# Patient Record
Sex: FEMALE | Race: WHITE | Employment: OTHER | URBAN - METROPOLITAN AREA
[De-identification: names, ages, dates, MRNs, and addresses within clinical notes are randomized per-mention and may not be internally consistent; named-entity substitution may affect disease eponyms.]

---

## 2017-03-23 PROBLEM — E78.5 DYSLIPIDEMIA: Status: ACTIVE | Noted: 2017-03-23

## 2018-04-14 ENCOUNTER — HOSPITAL ENCOUNTER (INPATIENT)
Age: 68
LOS: 4 days | Discharge: HOME HEALTH | DRG: 189 | End: 2018-04-19
Attending: EMERGENCY MEDICINE | Admitting: INTERNAL MEDICINE
Payer: MEDICARE

## 2018-04-14 ENCOUNTER — APPOINTMENT (OUTPATIENT)
Dept: GENERAL RADIOLOGY | Age: 68
DRG: 189 | End: 2018-04-14
Attending: EMERGENCY MEDICINE
Payer: MEDICARE

## 2018-04-14 DIAGNOSIS — G47.34 NOCTURNAL HYPOXEMIA: Chronic | ICD-10-CM

## 2018-04-14 DIAGNOSIS — J96.01 ACUTE RESPIRATORY FAILURE WITH HYPOXIA AND HYPERCAPNIA (HCC): ICD-10-CM
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
## 2018-04-14 DIAGNOSIS — I95.9 HYPOTENSION, UNSPECIFIED HYPOTENSION TYPE: ICD-10-CM

## 2018-04-14 DIAGNOSIS — Z72.0 TOBACCO ABUSE: Chronic | ICD-10-CM

## 2018-04-14 DIAGNOSIS — J96.22 ACUTE ON CHRONIC RESPIRATORY FAILURE WITH HYPOXIA AND HYPERCAPNIA (HCC): ICD-10-CM

## 2018-04-14 DIAGNOSIS — J96.21 ACUTE ON CHRONIC RESPIRATORY FAILURE WITH HYPOXIA AND HYPERCAPNIA (HCC): ICD-10-CM

## 2018-04-14 DIAGNOSIS — J44.1 CHRONIC OBSTRUCTIVE PULMONARY DISEASE WITH ACUTE EXACERBATION (HCC): ICD-10-CM

## 2018-04-14 DIAGNOSIS — J96.02 ACUTE RESPIRATORY FAILURE WITH HYPOXIA AND HYPERCAPNIA (HCC): ICD-10-CM
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
## 2018-04-14 DIAGNOSIS — R00.1 BRADYCARDIA: ICD-10-CM

## 2018-04-14 DIAGNOSIS — S20.212A CONTUSION OF RIB ON LEFT SIDE, INITIAL ENCOUNTER: ICD-10-CM

## 2018-04-14 DIAGNOSIS — B85.0 HEAD LICE INFESTATION: Chronic | ICD-10-CM

## 2018-04-14 DIAGNOSIS — J44.1 COPD EXACERBATION (HCC): ICD-10-CM

## 2018-04-14 DIAGNOSIS — J44.1 ACUTE EXACERBATION OF CHRONIC OBSTRUCTIVE PULMONARY DISEASE (COPD) (HCC): Primary | ICD-10-CM

## 2018-04-14 PROBLEM — J96.00 ACUTE RESPIRATORY FAILURE (HCC): Status: ACTIVE | Noted: 2018-04-14

## 2018-04-14 LAB
ALBUMIN SERPL-MCNC: 3.2 G/DL (ref 3.2–4.6)
ALBUMIN/GLOB SERPL: 0.7 {RATIO} (ref 1.2–3.5)
ALP SERPL-CCNC: 93 U/L (ref 50–136)
ALT SERPL-CCNC: 19 U/L (ref 12–65)
ANION GAP SERPL CALC-SCNC: 7 MMOL/L (ref 7–16)
AST SERPL-CCNC: 30 U/L (ref 15–37)
BASOPHILS # BLD: 0.1 K/UL (ref 0–0.2)
BASOPHILS NFR BLD: 1 % (ref 0–2)
BILIRUB SERPL-MCNC: 0.5 MG/DL (ref 0.2–1.1)
BUN SERPL-MCNC: 11 MG/DL (ref 8–23)
CALCIUM SERPL-MCNC: 9.2 MG/DL (ref 8.3–10.4)
CHLORIDE SERPL-SCNC: 101 MMOL/L (ref 98–107)
CO2 SERPL-SCNC: 32 MMOL/L (ref 21–32)
CREAT SERPL-MCNC: 0.79 MG/DL (ref 0.6–1)
DIFFERENTIAL METHOD BLD: NORMAL
EOSINOPHIL # BLD: 0.3 K/UL (ref 0–0.8)
EOSINOPHIL NFR BLD: 4 % (ref 0.5–7.8)
ERYTHROCYTE [DISTWIDTH] IN BLOOD BY AUTOMATED COUNT: 12.6 % (ref 11.9–14.6)
GLOBULIN SER CALC-MCNC: 4.5 G/DL (ref 2.3–3.5)
GLUCOSE SERPL-MCNC: 186 MG/DL (ref 65–100)
HCT VFR BLD AUTO: 43.8 % (ref 35.8–46.3)
HGB BLD-MCNC: 14.4 G/DL (ref 11.7–15.4)
IMM GRANULOCYTES # BLD: 0.1 K/UL (ref 0–0.5)
IMM GRANULOCYTES NFR BLD AUTO: 1 % (ref 0–5)
INR PPP: 1
LYMPHOCYTES # BLD: 2.4 K/UL (ref 0.5–4.6)
LYMPHOCYTES NFR BLD: 29 % (ref 13–44)
MCH RBC QN AUTO: 29.8 PG (ref 26.1–32.9)
MCHC RBC AUTO-ENTMCNC: 32.9 G/DL (ref 31.4–35)
MCV RBC AUTO: 90.7 FL (ref 79.6–97.8)
MONOCYTES # BLD: 0.6 K/UL (ref 0.1–1.3)
MONOCYTES NFR BLD: 7 % (ref 4–12)
NEUTS SEG # BLD: 4.8 K/UL (ref 1.7–8.2)
NEUTS SEG NFR BLD: 58 % (ref 43–78)
PLATELET # BLD AUTO: 205 K/UL (ref 150–450)
PMV BLD AUTO: 11.2 FL (ref 10.8–14.1)
POTASSIUM SERPL-SCNC: 4.4 MMOL/L (ref 3.5–5.1)
PROT SERPL-MCNC: 7.7 G/DL (ref 6.3–8.2)
PROTHROMBIN TIME: 13.1 SEC (ref 11.5–14.5)
RBC # BLD AUTO: 4.83 M/UL (ref 4.05–5.25)
SODIUM SERPL-SCNC: 140 MMOL/L (ref 136–145)
WBC # BLD AUTO: 8.2 K/UL (ref 4.3–11.1)

## 2018-04-14 PROCEDURE — 80053 COMPREHEN METABOLIC PANEL: CPT | Performed by: EMERGENCY MEDICINE

## 2018-04-14 PROCEDURE — 74011000250 HC RX REV CODE- 250: Performed by: EMERGENCY MEDICINE

## 2018-04-14 PROCEDURE — 74011250637 HC RX REV CODE- 250/637: Performed by: EMERGENCY MEDICINE

## 2018-04-14 PROCEDURE — 83880 ASSAY OF NATRIURETIC PEPTIDE: CPT | Performed by: EMERGENCY MEDICINE

## 2018-04-14 PROCEDURE — 96375 TX/PRO/DX INJ NEW DRUG ADDON: CPT | Performed by: EMERGENCY MEDICINE

## 2018-04-14 PROCEDURE — 96374 THER/PROPH/DIAG INJ IV PUSH: CPT | Performed by: EMERGENCY MEDICINE

## 2018-04-14 PROCEDURE — 85025 COMPLETE CBC W/AUTO DIFF WBC: CPT | Performed by: EMERGENCY MEDICINE

## 2018-04-14 PROCEDURE — 99285 EMERGENCY DEPT VISIT HI MDM: CPT | Performed by: EMERGENCY MEDICINE

## 2018-04-14 PROCEDURE — 74011250636 HC RX REV CODE- 250/636: Performed by: EMERGENCY MEDICINE

## 2018-04-14 PROCEDURE — 71101 X-RAY EXAM UNILAT RIBS/CHEST: CPT

## 2018-04-14 PROCEDURE — 94640 AIRWAY INHALATION TREATMENT: CPT

## 2018-04-14 PROCEDURE — 85610 PROTHROMBIN TIME: CPT | Performed by: EMERGENCY MEDICINE

## 2018-04-14 RX ORDER — IPRATROPIUM BROMIDE AND ALBUTEROL SULFATE 2.5; .5 MG/3ML; MG/3ML
3 SOLUTION RESPIRATORY (INHALATION)
Status: COMPLETED | OUTPATIENT
Start: 2018-04-14 | End: 2018-04-14

## 2018-04-14 RX ORDER — MORPHINE SULFATE 4 MG/ML
4 INJECTION, SOLUTION INTRAMUSCULAR; INTRAVENOUS
Status: COMPLETED | OUTPATIENT
Start: 2018-04-14 | End: 2018-04-14

## 2018-04-14 RX ORDER — MAGNESIUM SULFATE HEPTAHYDRATE 40 MG/ML
2 INJECTION, SOLUTION INTRAVENOUS
Status: COMPLETED | OUTPATIENT
Start: 2018-04-14 | End: 2018-04-14

## 2018-04-14 RX ORDER — HYDROCODONE BITARTRATE AND ACETAMINOPHEN 7.5; 325 MG/1; MG/1
1 TABLET ORAL
Status: COMPLETED | OUTPATIENT
Start: 2018-04-14 | End: 2018-04-14

## 2018-04-14 RX ORDER — ONDANSETRON 2 MG/ML
4 INJECTION INTRAMUSCULAR; INTRAVENOUS
Status: COMPLETED | OUTPATIENT
Start: 2018-04-14 | End: 2018-04-14

## 2018-04-14 RX ORDER — ALBUTEROL SULFATE 0.83 MG/ML
5 SOLUTION RESPIRATORY (INHALATION)
Status: COMPLETED | OUTPATIENT
Start: 2018-04-14 | End: 2018-04-14

## 2018-04-14 RX ADMIN — ALBUTEROL SULFATE 5 MG: 2.5 SOLUTION RESPIRATORY (INHALATION) at 22:35

## 2018-04-14 RX ADMIN — ONDANSETRON 4 MG: 2 INJECTION INTRAMUSCULAR; INTRAVENOUS at 21:16

## 2018-04-14 RX ADMIN — HYDROCODONE BITARTRATE AND ACETAMINOPHEN 1 TABLET: 7.5; 325 TABLET ORAL at 22:37

## 2018-04-14 RX ADMIN — MAGNESIUM SULFATE HEPTAHYDRATE 2 G: 40 INJECTION, SOLUTION INTRAVENOUS at 23:12

## 2018-04-14 RX ADMIN — MORPHINE SULFATE 4 MG: 4 INJECTION, SOLUTION INTRAMUSCULAR; INTRAVENOUS at 21:16

## 2018-04-14 RX ADMIN — METHYLPREDNISOLONE SODIUM SUCCINATE 125 MG: 125 INJECTION, POWDER, FOR SOLUTION INTRAMUSCULAR; INTRAVENOUS at 22:24

## 2018-04-14 RX ADMIN — IPRATROPIUM BROMIDE AND ALBUTEROL SULFATE 3 ML: .5; 3 SOLUTION RESPIRATORY (INHALATION) at 21:08

## 2018-04-14 NOTE — IP AVS SNAPSHOT
[facility] 
 
 
 [address] [place] [address] 322 [facility] 
[phone] Patient: Alize Ricci MRN: [id number] About your hospitalization You were admitted on:  April 15, 2018 You last received care in the:  [facility] You were discharged on:  April 19, 2018 Why you were hospitalized Your primary diagnosis was:  Acute On Chronic Respiratory Failure With Hypoxia And Hypercapnia (Hcc) Your diagnoses also included:  Dm (Diabetes Mellitus) (Hcc), Coronary Artery Disease Involving Native Coronary Artery With Unstable Angina Pectoris (Hcc), Hypothyroidism, Head Lice Infestation, Tobacco Abuse, Nocturnal Hypoxemia, Fall, Bradycardia, Copd Exacerbation (Hcc), Morbid Obesity (Hcc) Follow-up Information Follow up With Details Comments Contact Info Raheel Flowers MD On 4/25/2018 1:15pm [place] [address] [address] [address] [place] [address] 
[phone] BETO Ying On 5/15/2018 12:30  [address] [facility] [address] 
[address] [facility] [address] 
[phone] [address] [facility] [address] 
[phone] Your Scheduled Appointments Monday April 23, 2018 10:45 AM EDT Office Visit with Joseph Cid, 700 [facility] (800 [facility]) [address] 
[address] [facility]  
[phone] Tuesday May 15, 2018  1:00 PM EDT  
(Arrive by 12:30 PM) HOSPITAL with BETO Y[facility]) 75 [facility] [address]  
[phone] Discharge Orders None A check jihan indicates which time of day the medication should be taken. My Medications START taking these medications Instructions Each Dose to Equal  
 Morning Noon Evening Bedtime albuterol-ipratropium 2.5 mg-0.5 mg/3 ml Nebu Commonly known as:  Arlette Banerjee Your next dose is:  Per as needed schedule 3 mL by Nebulization route every six (6) hours as needed. 3 mL  
    
   
   
   
  
 azithromycin 250 mg tablet Commonly known as:  Aba Raman Your last dose was:  4/18/2018 9pm  
Your next dose is:  04/19/18 9pm  
   
 Take 1 Tab by mouth daily for 4 days. 250 mg  
    
   
   
   
  
  
 fluticasone-salmeterol 250-50 mcg/dose diskus inhaler Commonly known as:  ADVAIR Your next dose is:  Resume home schedule Take 1 Puff by inhalation two (2) times a day. 1 Puff  
    
  
   
   
  
   
  
 predniSONE 20 mg tablet Commonly known as:  Krishan Llanos Your last dose was:  04/19/18 am  
Your next dose is:  04/20/18 am  
   
 Take 2 tabs daily x 2 days, take 1.5 tabs daily x 2 days, take 1 tab daily x 2 days, take 1/2 tab daily x 2 days then stop. CHANGE how you take these medications Instructions Each Dose to Equal  
 Morning Noon Evening Bedtime ALPRAZolam 2 mg tablet Commonly known as:  Keily Sweeney What changed:   
- how much to take - when to take this 
- reasons to take this Your last dose was:  4/18/2018 9:40pm  
Your next dose is:  Per as needed schedule Take 0.5 Tabs by mouth three (3) times daily as needed for Anxiety. Max Daily Amount: 3 mg. Take / use AM day of surgery  per anesthesia protocols. Indications: ANXIETY WITH DEPRESSION  
 1 mg CONTINUE taking these medications Instructions Each Dose to Equal  
 Morning Noon Evening Bedtime ACTOS 30 mg tablet Generic drug:  pioglitazone Your next dose is:  Resume home schedule Take 30 mg by mouth daily. Indications: type 2 diabetes mellitus 30 mg ANTIVERT 25 mg tablet Generic drug:  meclizine Your next dose is:  Per as needed schedule Take 25 mg by mouth three (3) times daily as needed. Indications: VERTIGO 25 mg  
    
   
   
   
  
 aspirin 81 mg tablet Your last dose was:  04/19/18 am  
Your next dose is:  04/20/18 am  
   
 Take 81 mg by mouth daily. Take / use AM day of surgery  per anesthesia protocols. 81 mg  
    
  
   
   
   
  
 clopidogrel 75 mg Tab Commonly known as:  PLAVIX Your last dose was:  04/19/18 am  
Your next dose is:  04/20/18 am  
   
 Take 1 Tab by mouth daily. 75 mg  
    
  
   
   
   
  
 CRESTOR 10 mg tablet Generic drug:  rosuvastatin Your last dose was:  4/18/2018 bedtime Your next dose is:  04/19/18 bedtime Take 10 mg by mouth nightly. Indications: HYPERCHOLESTEROLEMIA 10 mg  
    
   
   
   
  
  
 fenofibrate micronized 200 mg capsule Commonly known as:  LOFIBRA Your next dose is:  04/19/18 bedtime TAKE ONE CAPSULE BY MOUTH EACH NIGHT AT BEDTIME FOR TRIGLYCERIDES AND CHOLESTEROL. LANTUS U-100 INSULIN 100 unit/mL injection Generic drug:  insulin glargine Your last dose was:  4/18/2018 9:30pm  
Your next dose is:  04/19/18 bedtime 80 Units by SubCUTAneous route nightly. Indications: TYPE 2 DIABETES MELLITUS  
 80 Units LEXAPRO 20 mg tablet Generic drug:  escitalopram oxalate Your next dose is:  Resume home schedule Take 20 mg by mouth daily. Take / use AM day of surgery  per anesthesia protocols. Indications: ANXIETY WITH DEPRESSION  
 20 mg  
    
  
   
   
   
  
 loperamide 2 mg tablet Commonly known as:  IMMODIUM Your next dose is:  Per as needed schedule Take 2 mg by mouth four (4) times daily as needed for Diarrhea.  
 2 mg  
    
   
   
   
  
 losartan 25 mg tablet Commonly known as:  COZAAR Your last dose was:  04/19/18 am  
Your next dose is:  04/20/18 am  
   
 TAKE ONE TABLET BY MOUTH EVERY DAY FOR BLOOD PRESSURE  
     
  
   
   
   
  
 MUCINEX 1,200 mg Ta12 ER tablet Generic drug:  guaiFENesin Your last dose was:  04/19/18 am  
Your next dose is:  04/19/18 9pm  
   
 Take 1,200 mg by mouth two (2) times a day. Indications: COLD SYMPTOMS, Cough 1200 mg  
    
  
   
   
   
  
  
 niacin  mg tablet Commonly known as:  Eva Foster Your last dose was:  4/18/2018 9:40pm  
Your next dose is:  04/19/18 pm  
   
 TAKE ONE TABLET BY MOUTH ONCE DAILY FOR TRIGLYCERIDES AND CHOLESTEROL. NITROSTAT 0.4 mg SL tablet Generic drug:  nitroglycerin Your next dose is:  Per as needed schedule  
   
 0.4 mg by SubLINGual route every five (5) minutes as needed. Take / use AM day of surgery  per anesthesia protocols if needed 0.4 mg  
    
   
   
   
  
 PROAIR HFA 90 mcg/actuation inhaler Generic drug:  albuterol Your next dose is:  Per as needed schedule Take 2 Puffs by inhalation every four (4) hours as needed. Take / use AM day of surgery  per anesthesia protocols if needed 2 Puff PROTONIX 40 mg tablet Generic drug:  pantoprazole Your last dose was:  04/19/18 am  
Your next dose is:  04/19/18 4pm  
   
 Take 40 mg by mouth two (2) times a day. Take / use AM day of surgery  per anesthesia protocols. Indications: GASTROESOPHAGEAL REFLUX  
 40 mg SYNTHROID 100 mcg tablet Generic drug:  levothyroxine Your last dose was:  04/19/18 am  
Your next dose is:  04/20/18 am  
   
 Take 100 mcg by mouth Daily (before breakfast). Per anesthesia protocol:instructed to take am of surgery. Indications: HYPOTHYROIDISM  
 100 mcg VOLTAREN 1 % Gel Generic drug:  diclofenac Your next dose is:  Resume home schedule Apply  to affected area four (4) times daily. Indications: OSTEOARTHRITIS OF THE KNEE  
     
   
   
   
  
  
STOP taking these medications   
 insulin lispro  & lisp protamine (human) 100 unit/mL (75-25) Inpn Commonly known as:  HUMALOG 75-25 MIX ASK your doctor about these medications Instructions Each Dose to Equal  
 Morning Noon Evening Bedtime  
 metoprolol tartrate 25 mg tablet Commonly known as:  LOPRESSOR Your last dose was:  4/15/2018 6pm  
   
 Take 1 Tab by mouth two (2) times a day. 25 mg Where to Get Your Medications Information on where to get these meds will be given to you by the nurse or doctor. ! Ask your nurse or doctor about these medications  
  albuterol-ipratropium 2.5 mg-0.5 mg/3 ml Nebu ALPRAZolam 2 mg tablet  
 azithromycin 250 mg tablet  
 fluticasone-salmeterol 250-50 mcg/dose diskus inhaler  
 predniSONE 20 mg tablet Discharge Instructions DISCHARGE SUMMARY from Nurse PATIENT INSTRUCTIONS: 
 
After general anesthesia or intravenous sedation, for 24 hours or while taking prescription Narcotics: · Limit your activities · Do not drive and operate hazardous machinery · Do not make important personal or business decisions · Do  not drink alcoholic beverages · If you have not urinated within 8 hours after discharge, please contact your surgeon on call. Report the following to your surgeon: 
· Excessive pain, swelling, redness or odor of or around the surgical area · Temperature over 100.5 · Nausea and vomiting lasting longer than 4 hours or if unable to take medications · Any signs of decreased circulation or nerve impairment to extremity: change in color, persistent  numbness, tingling, coldness or increase pain · Any questions What to do at Home: 
Recommended activity: Activity as tolerated. If you experience any of the following symptoms temp > 101.5, worsening cough or wheezing, shortness of breath or fatigue not relieved with rest, please follow up with MD. 
 
*  Please give a list of your current medications to your Primary Care Provider. *  Please update this list whenever your medications are discontinued, doses are 
    changed, or new medications (including over-the-counter products) are added. *  Please carry medication information at all times in case of emergency situations. These are general instructions for a healthy lifestyle: No smoking/ No tobacco products/ Avoid exposure to second hand smoke Surgeon General's Warning:  Quitting smoking now greatly reduces serious risk to your health. Obesity, smoking, and sedentary lifestyle greatly increases your risk for illness A healthy diet, regular physical exercise & weight monitoring are important for maintaining a healthy lifestyle You may be retaining fluid if you have a history of heart failure or if you experience any of the following symptoms:  Weight gain of 3 pounds or more overnight or 5 pounds in a week, increased swelling in our hands or feet or shortness of breath while lying flat in bed. Please call your doctor as soon as you notice any of these symptoms; do not wait until your next office visit. Recognize signs and symptoms of STROKE: 
 
F-face looks uneven A-arms unable to move or move unevenly S-speech slurred or non-existent T-time-call 911 as soon as signs and symptoms begin-DO NOT go Back to bed or wait to see if you get better-TIME IS BRAIN. Warning Signs of HEART ATTACK Call 911 if you have these symptoms: 
? Chest discomfort. Most heart attacks involve discomfort in the center of the chest that lasts more than a few minutes, or that goes away and comes back. It can feel like uncomfortable pressure, squeezing, fullness, or pain. ? Discomfort in other areas of the upper body. Symptoms can include pain or discomfort in one or both arms, the back, neck, jaw, or stomach. ? Shortness of breath with or without chest discomfort. ? Other signs may include breaking out in a cold sweat, nausea, or lightheadedness. Don't wait more than five minutes to call [address]! Fast action can save your life. Calling 911 is almost always the fastest way to get lifesaving treatment. Emergency Medical Services staff can begin treatment when they arrive  up to an hour sooner than if someone gets to the hospital by car. The discharge information has been reviewed with the patient. The patient verbalized understanding. Discharge medications reviewed with the patient and appropriate educational materials and side effects teaching were provided. Chronic Obstructive Pulmonary Disease (COPD): Care Instructions Your Care Instructions Chronic obstructive pulmonary disease (COPD) is a general term for a group of lung diseases, including emphysema and chronic bronchitis. People with COPD have decreased airflow in and out of the lungs, which makes it hard to breathe. The airways also can get clogged with thick mucus. Cigarette smoking is a major cause of COPD. Although there is no cure for COPD, you can slow its progress. Following your treatment plan and taking care of yourself can help you feel better and live longer. Follow-up care is a key part of your treatment and safety. Be sure to make and go to all appointments, and call your doctor if you are having problems. It's also a good idea to know your test results and keep a list of the medicines you take. How can you care for yourself at home? ?Staying healthy ? · Do not smoke. This is the most important step you can take to prevent more damage to your lungs. If you need help quitting, talk to your doctor about stop-smoking programs and medicines. These can increase your chances of quitting for good. ? · Avoid colds and flu. Get a pneumococcal vaccine shot. If you have had one before, ask your doctor whether you need a second dose. Get the flu vaccine every fall. If you must be around people with colds or the flu, wash your hands often. ? · Avoid secondhand smoke, air pollution, and high altitudes. Also avoid cold, dry air and hot, humid air. Stay at home with your windows closed when air pollution is bad. ?Medicines and oxygen therapy ? · Take your medicines exactly as prescribed. Call your doctor if you think you are having a problem with your medicine. ? · You may be taking medicines such as: ¨ Bronchodilators. These help open your airways and make breathing easier. Bronchodilators are either short-acting (work for 6 to 9 hours) or long-acting (work for 24 hours). You inhale most bronchodilators, so they start to act quickly. Always carry your quick-relief inhaler with you in case you need it while you are away from home. ¨ Corticosteroids (prednisone, budesonide). These reduce airway inflammation. They come in pill or inhaled form. You must take these medicines every day for them to work well. ? · A spacer may help you get more inhaled medicine to your lungs. Ask your doctor or pharmacist if a spacer is right for you. If it is, ask how to use it properly. ? · Do not take any vitamins, over-the-counter medicine, or herbal products without talking to your doctor first.  
? · If your doctor prescribed antibiotics, take them as directed. Do not stop taking them just because you feel better. You need to take the full course of antibiotics. ? · Oxygen therapy boosts the amount of oxygen in your blood and helps you breathe easier. Use the flow rate your doctor has recommended, and do not change it without talking to your doctor first.  
Activity ? · Get regular exercise. Walking is an easy way to get exercise. Start out slowly, and walk a little more each day. ? · Pay attention to your breathing. You are exercising too hard if you cannot talk while you are exercising. ? · Take short rest breaks when doing household chores and other activities.   
? · Learn breathing methods-such as breathing through pursed lips-to help you become less short of breath. ? · If your doctor has not set you up with a pulmonary rehabilitation program, talk to him or her about whether rehab is right for you. Rehab includes exercise programs, education about your disease and how to manage it, help with diet and other changes, and emotional support. Diet ? · Eat regular, healthy meals. Use bronchodilators about 1 hour before you eat to make it easier to eat. Eat several small meals instead of three large ones. Drink beverages at the end of the meal. Avoid foods that are hard to chew. ? · Eat foods that contain protein so that you do not lose muscle mass. ? · Talk with your doctor if you gain too much weight or if you lose weight without trying. ?Mental health ? · Talk to your family, friends, or a therapist about your feelings. It is normal to feel frightened, angry, hopeless, helpless, and even guilty. Talking openly about bad feelings can help you cope. If these feelings last, talk to your doctor. When should you call for help? Call 911 anytime you think you may need emergency care. For example, call if: 
? · You have severe trouble breathing. ?Call your doctor now or seek immediate medical care if: 
? · You have new or worse trouble breathing. ? · You cough up blood. ? · You have a fever. ? Watch closely for changes in your health, and be sure to contact your doctor if: 
? · You cough more deeply or more often, especially if you notice more mucus or a change in the color of your mucus. ? · You have new or worse swelling in your legs or belly. ? · You are not getting better as expected. Where can you learn more? Go to http://anibal-lamont.info/. Esther Ferreira in the search box to learn more about \"Chronic Obstructive Pulmonary Disease (COPD): Care Instructions. \" Current as of: May 12, 2017 Content Version: 11.4 © 6667-4291 [employer].  Care instructions adapted under license by [address] (which disclaims liability or warranty for this information). If you have questions about a medical condition or this instruction, always ask your healthcare professional. [address] any warranty or liability for your use of this information. Chronic Obstructive Pulmonary Disease (COPD) Flare-Ups: Care Instructions Your Care Instructions Chronic obstructive pulmonary disease (COPD) is a lung disease that makes it hard to breathe. It is caused by damage to the lungs over many years, usually from smoking. COPD is often a mix of two diseases: · Chronic bronchitis: The airways that carry air to the lungs (bronchial tubes) get inflamed and make a lot of mucus. This can narrow or block the airways. · Emphysema: In a healthy person, the tiny air sacs in the lungs are like balloons. As you breathe in and out, they get bigger and smaller to move air through your lungs. But with emphysema, these air sacs are damaged and lose their stretch. Less air gets in and out of the lungs. Many people with COPD have attacks called flare-ups or exacerbations. This is when your usual symptoms quickly get worse and stay worse. The doctor has checked you carefully. But problems can develop later. If you notice any problems or new symptoms, get medical treatment right away. Follow-up care is a key part of your treatment and safety. Be sure to make and go to all appointments, and call your doctor if you are having problems. It's also a good idea to know your test results and keep a list of the medicines you take. How can you care for yourself at home? · Be safe with medicines. Take your medicines exactly as prescribed. Call your doctor if you think you are having a problem with your medicine. You may be taking medicines such as: ¨ Bronchodilators. These help open your airways and make breathing easier. ¨ Corticosteroids. These reduce airway inflammation. They may be given as pills, in a vein, or in an inhaled form. You may go home with pills in addition to an inhaler that you already use. · A spacer may help you get more inhaled medicine to your lungs. Ask your doctor or pharmacist if a spacer is right for you. If it is, ask how to use it properly. · If your doctor prescribed antibiotics, take them as directed. Do not stop taking them just because you feel better. You need to take the full course of antibiotics. · If your doctor prescribed oxygen, use the flow rate your doctor has recommended. Do not change it without talking to your doctor first. 
· Do not smoke. Smoking makes COPD worse. If you need help quitting, talk to your doctor about stop-smoking programs and medicines. These can increase your chances of quitting for good. When should you call for help? Call 911 anytime you think you may need emergency care. For example, call if: 
? · You have severe trouble breathing. ?Call your doctor now or seek immediate medical care if: 
? · You have new or worse trouble breathing. ? · Your coughing or wheezing gets worse. ? · You cough up dark brown or bloody mucus (sputum). ? · You have a new or higher fever. ? Watch closely for changes in your health, and be sure to contact your doctor if: 
? · You notice more mucus or a change in the color of your mucus. ? · You need to use your antibiotic or steroid pills. ? · You do not get better as expected. Where can you learn more? Go to http://anibal-lamont.info/. Enter W316 in the search box to learn more about \"Chronic Obstructive Pulmonary Disease (COPD) Flare-Ups: Care Instructions. \" Current as of: May 12, 2017 Content Version: 11.4 © 5608-1513 [employer].  Care instructions adapted under license by [employer] (which disclaims liability or warranty for this information). If you have questions about a medical condition or this instruction, always ask your healthcare professional. [address] any warranty or liability for your use of this information. ___________________________________________________________________________________________________________________________________ [employer] Announcement We are excited to announce that we are making your provider's discharge notes available to you in MascotaNube. You will see these notes when they are completed and signed by the physician that discharged you from your recent hospital stay. If you have any questions or concerns about any information you see in MascotaNube, please call the Health Information Department where you were seen or reach out to your Primary Care Provider for more information about your plan of care. Introducing \[facility]\"" & HEALTH SERVICES! [employer] introduces MascotaNube patient portal. Now you can access parts of your medical record, email your doctor's office, and request medication refills online. 1. In your internet browser, go to [email or web address] 2. Click on the First Time User? Click Here link in the Sign In box. You will see the New Member Sign Up page. 3. Enter your MascotaNube Access Code exactly as it appears below. You will not need to use this code after youve completed the sign-up process. If you do not sign up before the expiration date, you must request a new code. · MascotaNube Access Code: [email or web address] Expires: 7/13/2018  8:48 PM 
 
4. Enter the last four digits of your Social Security Number (xxxx) and Date of Birth (mm/dd/yyyy) as indicated and click Submit. You will be taken to the next sign-up page. 5. Create a MascotaNube ID. This will be your MyChart login ID and cannot be changed, so think of one that is secure and easy to remember. 6. Create a QuickProNotest password. You can change your password at any time. 7. Enter your Password Reset Question and Answer. This can be used at a later time if you forget your password. 8. Enter your e-mail address. You will receive e-mail notification when new information is available in [address] 9. Click Sign Up. You can now view and download portions of your medical record. 10. Click the Download Summary menu link to download a portable copy of your medical information. If you have questions, please visit the Frequently Asked Questions section of the Pogoseat website. Remember, Pogoseat is NOT to be used for urgent needs. For medical emergencies, dial 911. Now available from your iPhone and Android! Introducing Rell Chamberlain As a [facility] patient, I wanted to make you aware of our electronic visit tool called Rell [id number] films. Geovanna Gallo 24/Genius Pack allows you to connect within minutes with a medical provider 24 hours a day, seven days a week via a mobile device or tablet or logging into a secure website from your computer. You can access Rell Chamberlain from anywhere in the [place]. A virtual visit might be right for you when you have a simple condition and feel like you just dont want to get out of bed, or cant get away from work for an appointment, when your [facility] provider is not available (evenings, weekends or holidays), or when youre out of town and need minor care. Electronic visits cost only $49 and if the Geovanna Holder 24/7 provider determines a prescription is needed to treat your condition, one can be electronically transmitted to a nearby pharmacy*. Please take a moment to enroll today if you have not already done so. The enrollment process is free and takes just a few minutes. To enroll, please download the K[employer]Thru/Genius Pack rizwana to your tablet or phone, or visit [email or web address] to enroll on your computer.    
And, as an [address] [place] [address] patient with a [employer] account, the results of your visits will be scanned into your electronic medical record and your primary care provider will be able to view the scanned results. We urge you to continue to see your regular [employer] provider for your ongoing medical care. And while your primary care provider may not be the one available when you seek a [facility]ell [facility]lain virtual visit, the peace of mind you get from getting a real diagnosis real time can be priceless. For more information on Rell Parsonfin, view our Frequently Asked Questions (FAQs) at [email or web address]. Sincerely, 
 
Xavi Roth MD 
Chief Medical Officer [employer] *:  certain medications cannot be prescribed via [employer] Providers Seen During Your Hospitalization Provider Specialty Primary office phone Gentry Sanchez MD Emergency Medicine [phone] Miriam Gomez MD Internal Medicine [phone] Immunizations Administered for This Admission Name Date  
 TB Skin Test (PPD) Intradermal  Deferred () Your Primary Care Physician (PCP) Primary Care Physician Office Phone Office Fax Eloytanisha Prashant [phone] You are allergic to the following Allergen Reactions Lisinopril Swelling Throat swelling Oxycodone Rash Recent Documentation Height Weight BMI OB Status Smoking Status 1.676 m 113.2 kg 40.28 kg/m2 Hysterectomy Current Every Day Smoker Emergency Contacts Name Discharge Info Relation Home Work Mobile Sharon Davenport  Child [2] [phone] Patient Belongings The following personal items are in your possession at time of discharge: 
  Dental Appliances: Uppers, Lowers  Visual Aid: Glasses, At bedside      Home Medications: None   Jewelry: Ring (L hand, Rings x2)  Clothing: At bedside, Shorts, Shirt    Other Valuables: [address], [address] home Please provide this summary of care documentation to your next provider. Signatures-by signing, you are acknowledging that this After Visit Summary has been reviewed with you and you have received a copy. Patient Signature:  ____________________________________________________________ Date:  ____________________________________________________________  
  
Edrie Gunnels Provider Signature:  ____________________________________________________________ Date:  ____________________________________________________________

## 2018-04-14 NOTE — IP AVS SNAPSHOT
[address] 
[phone] Patient: Ciara Xiong MRN: [id number] KTT:2/[birth year]69 A check jihan indicates which time of day the medication should be taken. My Medications START taking these medications Instructions Each Dose to Equal  
 Morning Noon Evening Bedtime  
 albuterol-ipratropium 2.5 mg-0.5 mg/3 ml Nebu Commonly known as:  Medhat Giang Your next dose is:  Per as needed schedule 3 mL by Nebulization route every six (6) hours as needed. 3 mL  
    
   
   
   
  
 azithromycin 250 mg tablet Commonly known as:  Charles Doan Your last dose was:  4/18/2018 9pm  
Your next dose is:  04/19/18 9pm  
   
 Take 1 Tab by mouth daily for 4 days. 250 mg  
    
   
   
   
  
  
 fluticasone-salmeterol 250-50 mcg/dose diskus inhaler Commonly known as:  ADVAIR Your next dose is:  Resume home schedule Take 1 Puff by inhalation two (2) times a day. 1 Puff  
    
  
   
   
  
   
  
 predniSONE 20 mg tablet Commonly known as:  Jesus Bah Your last dose was:  04/19/18 am  
Your next dose is:  04/20/18 am  
   
 Take 2 tabs daily x 2 days, take 1.5 tabs daily x 2 days, take 1 tab daily x 2 days, take 1/2 tab daily x 2 days then stop. CHANGE how you take these medications Instructions Each Dose to Equal  
 Morning Noon Evening Bedtime ALPRAZolam 2 mg tablet Commonly known as:  Shabbir Olmedo What changed:   
- how much to take - when to take this 
- reasons to take this Your last dose was:  4/18/2018 9:40pm  
Your next dose is:  Per as needed schedule Take 0.5 Tabs by mouth three (3) times daily as needed for Anxiety. Max Daily Amount: 3 mg. Take / use AM day of surgery  per anesthesia protocols. Indications: ANXIETY WITH DEPRESSION  
 1 mg CONTINUE taking these medications  Instructions Each Dose to Equal  
 Morning Noon Evening Bedtime ACTOS 30 mg tablet Generic drug:  pioglitazone Your next dose is:  Resume home schedule Take 30 mg by mouth daily. Indications: type 2 diabetes mellitus 30 mg ANTIVERT 25 mg tablet Generic drug:  meclizine Your next dose is:  Per as needed schedule Take 25 mg by mouth three (3) times daily as needed. Indications: VERTIGO 25 mg  
    
   
   
   
  
 aspirin 81 mg tablet Your last dose was:  04/19/18 am  
Your next dose is:  04/20/18 am  
   
 Take 81 mg by mouth daily. Take / use AM day of surgery  per anesthesia protocols. 81 mg  
    
  
   
   
   
  
 clopidogrel 75 mg Tab Commonly known as:  PLAVIX Your last dose was:  04/19/18 am  
Your next dose is:  04/20/18 am  
   
 Take 1 Tab by mouth daily. 75 mg  
    
  
   
   
   
  
 CRESTOR 10 mg tablet Generic drug:  rosuvastatin Your last dose was:  4/18/2018 bedtime Your next dose is:  04/19/18 bedtime Take 10 mg by mouth nightly. Indications: HYPERCHOLESTEROLEMIA 10 mg  
    
   
   
   
  
  
 fenofibrate micronized 200 mg capsule Commonly known as:  LOFIBRA Your next dose is:  04/19/18 bedtime TAKE ONE CAPSULE BY MOUTH EACH NIGHT AT BEDTIME FOR TRIGLYCERIDES AND CHOLESTEROL. LANTUS U-100 INSULIN 100 unit/mL injection Generic drug:  insulin glargine Your last dose was:  4/18/2018 9:30pm  
Your next dose is:  04/19/18 bedtime 80 Units by SubCUTAneous route nightly. Indications: TYPE 2 DIABETES MELLITUS  
 80 Units LEXAPRO 20 mg tablet Generic drug:  escitalopram oxalate Your next dose is:  Resume home schedule Take 20 mg by mouth daily. Take / use AM day of surgery  per anesthesia protocols. Indications: ANXIETY WITH DEPRESSION  
 20 mg  
    
  
   
   
   
  
 loperamide 2 mg tablet Commonly known as:  IMMODIUM Your next dose is:  Per as needed schedule Take 2 mg by mouth four (4) times daily as needed for Diarrhea.  
 2 mg  
    
   
   
   
  
 losartan 25 mg tablet Commonly known as:  COZAAR Your last dose was:  04/19/18 am  
Your next dose is:  04/20/18 am  
   
 TAKE ONE TABLET BY MOUTH EVERY DAY FOR BLOOD PRESSURE  
     
  
   
   
   
  
 MUCINEX 1,200 mg Ta12 ER tablet Generic drug:  guaiFENesin Your last dose was:  04/19/18 am  
Your next dose is:  04/19/18 9pm  
   
 Take 1,200 mg by mouth two (2) times a day. Indications: COLD SYMPTOMS, Cough 1200 mg  
    
  
   
   
   
  
  
 niacin  mg tablet Commonly known as:  Celio Citizen Your last dose was:  4/18/2018 9:40pm  
Your next dose is:  04/19/18 pm  
   
 TAKE ONE TABLET BY MOUTH ONCE DAILY FOR TRIGLYCERIDES AND CHOLESTEROL. NITROSTAT 0.4 mg SL tablet Generic drug:  nitroglycerin Your next dose is:  Per as needed schedule  
   
 0.4 mg by SubLINGual route every five (5) minutes as needed. Take / use AM day of surgery  per anesthesia protocols if needed 0.4 mg  
    
   
   
   
  
 PROAIR HFA 90 mcg/actuation inhaler Generic drug:  albuterol Your next dose is:  Per as needed schedule Take 2 Puffs by inhalation every four (4) hours as needed. Take / use AM day of surgery  per anesthesia protocols if needed 2 Puff PROTONIX 40 mg tablet Generic drug:  pantoprazole Your last dose was:  04/19/18 am  
Your next dose is:  04/19/18 4pm  
   
 Take 40 mg by mouth two (2) times a day. Take / use AM day of surgery  per anesthesia protocols. Indications: GASTROESOPHAGEAL REFLUX  
 40 mg SYNTHROID 100 mcg tablet Generic drug:  levothyroxine Your last dose was:  04/19/18 am  
Your next dose is:  04/20/18 am  
   
 Take 100 mcg by mouth Daily (before breakfast). Per anesthesia protocol:instructed to take am of surgery. Indications: HYPOTHYROIDISM  
 100 mcg VOLTAREN 1 % Gel Generic drug:  diclofenac Your next dose is:  Resume home schedule Apply  to affected area four (4) times daily. Indications: OSTEOARTHRITIS OF THE KNEE  
     
   
   
   
  
  
STOP taking these medications   
 insulin lispro  & lisp protamine (human) 100 unit/mL (75-25) Inpn Commonly known as:  HUMALOG 75-25 MIX ASK your doctor about these medications Instructions Each Dose to Equal  
 Morning Noon Evening Bedtime  
 metoprolol tartrate 25 mg tablet Commonly known as:  LOPRESSOR Your last dose was:  4/15/2018 6pm  
   
 Take 1 Tab by mouth two (2) times a day. 25 mg Where to Get Your Medications Information on where to get these meds will be given to you by the nurse or doctor. ! Ask your nurse or doctor about these medications  
  albuterol-ipratropium 2.5 mg-0.5 mg/3 ml Nebu ALPRAZolam 2 mg tablet  
 azithromycin 250 mg tablet  
 fluticasone-salmeterol 250-50 mcg/dose diskus inhaler  
 predniSONE 20 mg tablet

## 2018-04-15 ENCOUNTER — APPOINTMENT (OUTPATIENT)
Dept: CT IMAGING | Age: 68
DRG: 189 | End: 2018-04-15
Attending: INTERNAL MEDICINE
Payer: MEDICARE

## 2018-04-15 ENCOUNTER — APPOINTMENT (OUTPATIENT)
Dept: GENERAL RADIOLOGY | Age: 68
DRG: 189 | End: 2018-04-15
Attending: INTERNAL MEDICINE
Payer: MEDICARE

## 2018-04-15 PROBLEM — G47.34 NOCTURNAL HYPOXEMIA: Chronic | Status: ACTIVE | Noted: 2018-04-15

## 2018-04-15 PROBLEM — B85.0 HEAD LICE INFESTATION: Chronic | Status: ACTIVE | Noted: 2018-04-15

## 2018-04-15 PROBLEM — Z72.0 TOBACCO ABUSE: Chronic | Status: ACTIVE | Noted: 2018-04-15
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
## 2018-04-15 LAB
ANION GAP SERPL CALC-SCNC: 7 MMOL/L (ref 7–16)
APPEARANCE UR: CLEAR
ARTERIAL PATENCY WRIST A: POSITIVE
BASE DEFICIT BLDA-SCNC: 1.8 MMOL/L (ref 0–2)
BASE EXCESS BLDA CALC-SCNC: 0.6 MMOL/L (ref 0–3)
BASE EXCESS BLDA CALC-SCNC: 1.4 MMOL/L (ref 0–3)
BASOPHILS # BLD: 0 K/UL (ref 0–0.2)
BASOPHILS NFR BLD: 0 % (ref 0–2)
BDY SITE: ABNORMAL
BILIRUB UR QL: NEGATIVE
BNP SERPL-MCNC: 184 PG/ML
BUN SERPL-MCNC: 14 MG/DL (ref 8–23)
CA-I BLD-SCNC: 1.14 MMOL/L (ref 1–1.3)
CALCIUM SERPL-MCNC: 8.6 MG/DL (ref 8.3–10.4)
CHLORIDE BLDA-SCNC: 94 MMOL/L (ref 98–106)
CHLORIDE SERPL-SCNC: 96 MMOL/L (ref 98–107)
CO2 SERPL-SCNC: 32 MMOL/L (ref 21–32)
COHGB MFR BLD: 0.9 % (ref 0.5–1.5)
COHGB MFR BLD: 0.9 % (ref 0.5–1.5)
COHGB MFR BLD: 1.3 % (ref 0.5–1.5)
COLOR UR: YELLOW
CREAT SERPL-MCNC: 1.03 MG/DL (ref 0.6–1)
DIFFERENTIAL METHOD BLD: ABNORMAL
DO-HGB BLD-MCNC: 3 % (ref 0–5)
DO-HGB BLD-MCNC: 6 % (ref 0–5)
DO-HGB BLD-MCNC: 7 % (ref 0–5)
EOSINOPHIL # BLD: 0 K/UL (ref 0–0.8)
EOSINOPHIL NFR BLD: 0 % (ref 0.5–7.8)
ERYTHROCYTE [DISTWIDTH] IN BLOOD BY AUTOMATED COUNT: 12.7 % (ref 11.9–14.6)
EST. AVERAGE GLUCOSE BLD GHB EST-MCNC: 209 MG/DL
FIO2 ON VENT: 40 %
FIO2 ON VENT: 45 %
FIO2 ON VENT: 60 %
GAS FLOW.O2 O2 DELIVERY SYS: 10 L/MIN
GLUCOSE BLD STRIP.AUTO-MCNC: 246 MG/DL (ref 65–100)
GLUCOSE BLD STRIP.AUTO-MCNC: 349 MG/DL (ref 65–100)
GLUCOSE BLD STRIP.AUTO-MCNC: 363 MG/DL (ref 65–100)
GLUCOSE BLD STRIP.AUTO-MCNC: 427 MG/DL (ref 65–100)
GLUCOSE BLD STRIP.AUTO-MCNC: 460 MG/DL (ref 65–100)
GLUCOSE SERPL-MCNC: 505 MG/DL (ref 65–100)
GLUCOSE UR STRIP.AUTO-MCNC: >1000 MG/DL
HBA1C MFR BLD: 8.9 % (ref 4.8–6)
HCO3 BLDA-SCNC: 28 MMOL/L (ref 22–26)
HCO3 BLDA-SCNC: 31 MMOL/L (ref 22–26)
HCO3 BLDA-SCNC: 31 MMOL/L (ref 22–26)
HCT VFR BLD AUTO: 40.1 % (ref 35.8–46.3)
HGB BLD-MCNC: 12.9 G/DL (ref 11.7–15.4)
HGB BLDMV-MCNC: 13.8 GM/DL (ref 11.7–15)
HGB BLDMV-MCNC: 13.9 GM/DL (ref 11.7–15)
HGB BLDMV-MCNC: 13.9 GM/DL (ref 11.7–15)
HGB UR QL STRIP: NEGATIVE
IMM GRANULOCYTES # BLD: 0.2 K/UL (ref 0–0.5)
IMM GRANULOCYTES NFR BLD AUTO: 2 % (ref 0–5)
KETONES UR QL STRIP.AUTO: NEGATIVE MG/DL
LEUKOCYTE ESTERASE UR QL STRIP.AUTO: NEGATIVE
LYMPHOCYTES # BLD: 0.6 K/UL (ref 0.5–4.6)
LYMPHOCYTES NFR BLD: 7 % (ref 13–44)
MAGNESIUM SERPL-MCNC: 2 MG/DL (ref 1.8–2.4)
MCH RBC QN AUTO: 29.7 PG (ref 26.1–32.9)
MCHC RBC AUTO-ENTMCNC: 32.2 G/DL (ref 31.4–35)
MCV RBC AUTO: 92.2 FL (ref 79.6–97.8)
METHGB MFR BLD: 0.4 % (ref 0–1.5)
METHGB MFR BLD: 0.5 % (ref 0–1.5)
METHGB MFR BLD: 0.5 % (ref 0–1.5)
MONOCYTES # BLD: 0.1 K/UL (ref 0.1–1.3)
MONOCYTES NFR BLD: 2 % (ref 4–12)
NEUTS SEG # BLD: 7.7 K/UL (ref 1.7–8.2)
NEUTS SEG NFR BLD: 89 % (ref 43–78)
NITRITE UR QL STRIP.AUTO: NEGATIVE
OXYHGB MFR BLDA: 91.7 % (ref 94–97)
OXYHGB MFR BLDA: 92.4 % (ref 94–97)
OXYHGB MFR BLDA: 95.4 % (ref 94–97)
PCO2 BLDA: 69 MMHG (ref 35–45)
PCO2 BLDA: 75 MMHG (ref 35–45)
PCO2 BLDA: 80 MMHG (ref 35–45)
PH BLDA: 7.21 [PH] (ref 7.35–7.45)
PH BLDA: 7.22 [PH] (ref 7.35–7.45)
PH BLDA: 7.24 [PH] (ref 7.35–7.45)
PH UR STRIP: 6 [PH] (ref 5–9)
PLATELET # BLD AUTO: 222 K/UL (ref 150–450)
PMV BLD AUTO: 11.4 FL (ref 10.8–14.1)
PO2 BLDA: 108 MMHG (ref 80–105)
PO2 BLDA: 73 MMHG (ref 80–105)
PO2 BLDA: 79 MMHG (ref 80–105)
POTASSIUM BLDA-SCNC: 4.54 MMOL/L (ref 3.5–5.3)
POTASSIUM SERPL-SCNC: 4.5 MMOL/L (ref 3.5–5.1)
PROCALCITONIN SERPL-MCNC: 0.1 NG/ML
PROT UR STRIP-MCNC: NEGATIVE MG/DL
RBC # BLD AUTO: 4.35 M/UL (ref 4.05–5.25)
RESP RATE: 18
SAO2 % BLD: 93 % (ref 92–98.5)
SAO2 % BLD: 94 % (ref 92–98.5)
SAO2 % BLD: 97 % (ref 92–98.5)
SERVICE CMNT-IMP: ABNORMAL
SODIUM BLDA-SCNC: 132.3 MMOL/L (ref 135–148)
SODIUM SERPL-SCNC: 135 MMOL/L (ref 136–145)
SP GR UR REFRACTOMETRY: 1.03 (ref 1–1.02)
TSH SERPL DL<=0.005 MIU/L-ACNC: 0.74 UIU/ML (ref 0.36–3.74)
UROBILINOGEN UR QL STRIP.AUTO: 1 EU/DL (ref 0.2–1)
VENTILATION MODE VENT: ABNORMAL
VIT B12 SERPL-MCNC: 576 PG/ML (ref 193–986)
WBC # BLD AUTO: 8.7 K/UL (ref 4.3–11.1)

## 2018-04-15 PROCEDURE — 77010033678 HC OXYGEN DAILY

## 2018-04-15 PROCEDURE — 82607 VITAMIN B-12: CPT | Performed by: INTERNAL MEDICINE

## 2018-04-15 PROCEDURE — 74011250636 HC RX REV CODE- 250/636: Performed by: INTERNAL MEDICINE

## 2018-04-15 PROCEDURE — 36600 WITHDRAWAL OF ARTERIAL BLOOD: CPT

## 2018-04-15 PROCEDURE — 80048 BASIC METABOLIC PNL TOTAL CA: CPT | Performed by: INTERNAL MEDICINE

## 2018-04-15 PROCEDURE — 74011636637 HC RX REV CODE- 636/637: Performed by: INTERNAL MEDICINE

## 2018-04-15 PROCEDURE — 77030034849

## 2018-04-15 PROCEDURE — 77030021907 HC KT URIN FOL O&M -A

## 2018-04-15 PROCEDURE — 73502 X-RAY EXAM HIP UNI 2-3 VIEWS: CPT

## 2018-04-15 PROCEDURE — 86592 SYPHILIS TEST NON-TREP QUAL: CPT | Performed by: INTERNAL MEDICINE

## 2018-04-15 PROCEDURE — 83735 ASSAY OF MAGNESIUM: CPT | Performed by: NURSE PRACTITIONER

## 2018-04-15 PROCEDURE — 70450 CT HEAD/BRAIN W/O DYE: CPT

## 2018-04-15 PROCEDURE — 65610000001 HC ROOM ICU GENERAL

## 2018-04-15 PROCEDURE — 82962 GLUCOSE BLOOD TEST: CPT

## 2018-04-15 PROCEDURE — 94640 AIRWAY INHALATION TREATMENT: CPT

## 2018-04-15 PROCEDURE — 94760 N-INVAS EAR/PLS OXIMETRY 1: CPT

## 2018-04-15 PROCEDURE — 85025 COMPLETE CBC W/AUTO DIFF WBC: CPT | Performed by: INTERNAL MEDICINE

## 2018-04-15 PROCEDURE — 71046 X-RAY EXAM CHEST 2 VIEWS: CPT

## 2018-04-15 PROCEDURE — 94660 CPAP INITIATION&MGMT: CPT

## 2018-04-15 PROCEDURE — 80051 ELECTROLYTE PANEL: CPT

## 2018-04-15 PROCEDURE — 84443 ASSAY THYROID STIM HORMONE: CPT | Performed by: INTERNAL MEDICINE

## 2018-04-15 PROCEDURE — 83036 HEMOGLOBIN GLYCOSYLATED A1C: CPT | Performed by: INTERNAL MEDICINE

## 2018-04-15 PROCEDURE — 74011250637 HC RX REV CODE- 250/637: Performed by: INTERNAL MEDICINE

## 2018-04-15 PROCEDURE — 36415 COLL VENOUS BLD VENIPUNCTURE: CPT | Performed by: INTERNAL MEDICINE

## 2018-04-15 PROCEDURE — 74011250637 HC RX REV CODE- 250/637: Performed by: NURSE PRACTITIONER

## 2018-04-15 PROCEDURE — 74011000250 HC RX REV CODE- 250: Performed by: INTERNAL MEDICINE

## 2018-04-15 PROCEDURE — 81003 URINALYSIS AUTO W/O SCOPE: CPT | Performed by: INTERNAL MEDICINE

## 2018-04-15 PROCEDURE — 99223 1ST HOSP IP/OBS HIGH 75: CPT | Performed by: INTERNAL MEDICINE

## 2018-04-15 PROCEDURE — 84145 PROCALCITONIN (PCT): CPT | Performed by: INTERNAL MEDICINE

## 2018-04-15 RX ORDER — INSULIN LISPRO 100 [IU]/ML
14 INJECTION, SOLUTION INTRAVENOUS; SUBCUTANEOUS ONCE
Status: COMPLETED | OUTPATIENT
Start: 2018-04-15 | End: 2018-04-15

## 2018-04-15 RX ORDER — INSULIN GLARGINE 100 [IU]/ML
80 INJECTION, SOLUTION SUBCUTANEOUS
Status: DISCONTINUED | OUTPATIENT
Start: 2018-04-15 | End: 2018-04-19 | Stop reason: HOSPADM

## 2018-04-15 RX ORDER — METOPROLOL TARTRATE 25 MG/1
25 TABLET, FILM COATED ORAL 2 TIMES DAILY
Status: DISCONTINUED | OUTPATIENT
Start: 2018-04-15 | End: 2018-04-16

## 2018-04-15 RX ORDER — LOPERAMIDE HYDROCHLORIDE 2 MG/1
2 CAPSULE ORAL
Status: DISCONTINUED | OUTPATIENT
Start: 2018-04-15 | End: 2018-04-19 | Stop reason: HOSPADM

## 2018-04-15 RX ORDER — CLOPIDOGREL BISULFATE 75 MG/1
75 TABLET ORAL DAILY
Status: DISCONTINUED | OUTPATIENT
Start: 2018-04-15 | End: 2018-04-19 | Stop reason: HOSPADM

## 2018-04-15 RX ORDER — DICLOFENAC SODIUM 10 MG/G
GEL TOPICAL 4 TIMES DAILY
COMMUNITY
End: 2018-05-11

## 2018-04-15 RX ORDER — NYSTATIN 100000 [USP'U]/G
POWDER TOPICAL 2 TIMES DAILY
Status: DISCONTINUED | OUTPATIENT
Start: 2018-04-15 | End: 2018-04-19 | Stop reason: HOSPADM

## 2018-04-15 RX ORDER — PANTOPRAZOLE SODIUM 40 MG/1
40 TABLET, DELAYED RELEASE ORAL
Status: DISCONTINUED | OUTPATIENT
Start: 2018-04-15 | End: 2018-04-19 | Stop reason: HOSPADM

## 2018-04-15 RX ORDER — SODIUM CHLORIDE 0.9 % (FLUSH) 0.9 %
5-10 SYRINGE (ML) INJECTION EVERY 8 HOURS
Status: DISCONTINUED | OUTPATIENT
Start: 2018-04-15 | End: 2018-04-19 | Stop reason: HOSPADM

## 2018-04-15 RX ORDER — ACETAMINOPHEN 325 MG/1
650 TABLET ORAL
Status: DISCONTINUED | OUTPATIENT
Start: 2018-04-15 | End: 2018-04-19 | Stop reason: HOSPADM

## 2018-04-15 RX ORDER — NIACIN 500 MG/1
500 TABLET, EXTENDED RELEASE ORAL
Status: DISCONTINUED | OUTPATIENT
Start: 2018-04-15 | End: 2018-04-19 | Stop reason: HOSPADM

## 2018-04-15 RX ORDER — LEVOTHYROXINE SODIUM 100 UG/1
100 TABLET ORAL
Status: DISCONTINUED | OUTPATIENT
Start: 2018-04-15 | End: 2018-04-19 | Stop reason: HOSPADM

## 2018-04-15 RX ORDER — HEPARIN SODIUM 5000 [USP'U]/ML
5000 INJECTION, SOLUTION INTRAVENOUS; SUBCUTANEOUS EVERY 8 HOURS
Status: DISCONTINUED | OUTPATIENT
Start: 2018-04-15 | End: 2018-04-19 | Stop reason: HOSPADM

## 2018-04-15 RX ORDER — ROSUVASTATIN CALCIUM 5 MG/1
10 TABLET, COATED ORAL
Status: DISCONTINUED | OUTPATIENT
Start: 2018-04-15 | End: 2018-04-19 | Stop reason: HOSPADM

## 2018-04-15 RX ORDER — INSULIN GLARGINE 100 [IU]/ML
60 INJECTION, SOLUTION SUBCUTANEOUS
Status: DISCONTINUED | OUTPATIENT
Start: 2018-04-15 | End: 2018-04-15

## 2018-04-15 RX ORDER — SODIUM CHLORIDE 0.9 % (FLUSH) 0.9 %
5-10 SYRINGE (ML) INJECTION AS NEEDED
Status: DISCONTINUED | OUTPATIENT
Start: 2018-04-15 | End: 2018-04-19 | Stop reason: HOSPADM

## 2018-04-15 RX ORDER — INSULIN GLARGINE 100 [IU]/ML
60 INJECTION, SOLUTION SUBCUTANEOUS
Status: DISCONTINUED | OUTPATIENT
Start: 2018-04-15 | End: 2018-04-15 | Stop reason: SDUPTHER

## 2018-04-15 RX ORDER — HYDROCODONE BITARTRATE AND ACETAMINOPHEN 10; 325 MG/1; MG/1
1 TABLET ORAL
Status: DISCONTINUED | OUTPATIENT
Start: 2018-04-15 | End: 2018-04-19 | Stop reason: HOSPADM

## 2018-04-15 RX ORDER — PIOGLITAZONEHYDROCHLORIDE 30 MG/1
30 TABLET ORAL DAILY
COMMUNITY
End: 2018-05-11

## 2018-04-15 RX ORDER — LOSARTAN POTASSIUM 25 MG/1
25 TABLET ORAL DAILY
Status: DISCONTINUED | OUTPATIENT
Start: 2018-04-15 | End: 2018-04-19 | Stop reason: HOSPADM

## 2018-04-15 RX ORDER — INSULIN LISPRO 100 [IU]/ML
INJECTION, SOLUTION INTRAVENOUS; SUBCUTANEOUS
Status: DISCONTINUED | OUTPATIENT
Start: 2018-04-15 | End: 2018-04-19 | Stop reason: HOSPADM

## 2018-04-15 RX ORDER — NITROGLYCERIN 0.4 MG/1
0.4 TABLET SUBLINGUAL AS NEEDED
Status: DISCONTINUED | OUTPATIENT
Start: 2018-04-15 | End: 2018-04-19 | Stop reason: HOSPADM

## 2018-04-15 RX ORDER — IPRATROPIUM BROMIDE AND ALBUTEROL SULFATE 2.5; .5 MG/3ML; MG/3ML
3 SOLUTION RESPIRATORY (INHALATION)
Status: DISCONTINUED | OUTPATIENT
Start: 2018-04-15 | End: 2018-04-16

## 2018-04-15 RX ORDER — ALPRAZOLAM 0.5 MG/1
1 TABLET ORAL
Status: DISCONTINUED | OUTPATIENT
Start: 2018-04-15 | End: 2018-04-19 | Stop reason: HOSPADM

## 2018-04-15 RX ORDER — ASPIRIN 81 MG/1
81 TABLET ORAL DAILY
Status: DISCONTINUED | OUTPATIENT
Start: 2018-04-15 | End: 2018-04-19 | Stop reason: HOSPADM

## 2018-04-15 RX ADMIN — Medication 10 ML: at 02:00

## 2018-04-15 RX ADMIN — INSULIN GLARGINE 80 UNITS: 100 INJECTION, SOLUTION SUBCUTANEOUS at 21:32

## 2018-04-15 RX ADMIN — IPRATROPIUM BROMIDE AND ALBUTEROL SULFATE 3 ML: .5; 3 SOLUTION RESPIRATORY (INHALATION) at 10:40

## 2018-04-15 RX ADMIN — TETRAHYDROZOLINE HCL: 0.5 EYE DROP SOLUTION at 03:06

## 2018-04-15 RX ADMIN — Medication 10 ML: at 13:27

## 2018-04-15 RX ADMIN — PANTOPRAZOLE SODIUM 40 MG: 40 TABLET, DELAYED RELEASE ORAL at 05:55

## 2018-04-15 RX ADMIN — HEPARIN SODIUM 5000 UNITS: 5000 INJECTION, SOLUTION INTRAVENOUS; SUBCUTANEOUS at 13:26

## 2018-04-15 RX ADMIN — HYDROCODONE BITARTRATE AND ACETAMINOPHEN 1 TABLET: 10; 325 TABLET ORAL at 19:45

## 2018-04-15 RX ADMIN — INSULIN LISPRO 10 UNITS: 100 INJECTION, SOLUTION INTRAVENOUS; SUBCUTANEOUS at 11:39

## 2018-04-15 RX ADMIN — ROSUVASTATIN CALCIUM 10 MG: 5 TABLET, FILM COATED ORAL at 21:19

## 2018-04-15 RX ADMIN — INSULIN HUMAN 20 UNITS: 100 INJECTION, SUSPENSION SUBCUTANEOUS at 12:54

## 2018-04-15 RX ADMIN — METHYLPREDNISOLONE SODIUM SUCCINATE 60 MG: 125 INJECTION, POWDER, FOR SOLUTION INTRAMUSCULAR; INTRAVENOUS at 09:13

## 2018-04-15 RX ADMIN — HEPARIN SODIUM 5000 UNITS: 5000 INJECTION, SOLUTION INTRAVENOUS; SUBCUTANEOUS at 06:00

## 2018-04-15 RX ADMIN — Medication 10 ML: at 21:19

## 2018-04-15 RX ADMIN — IPRATROPIUM BROMIDE AND ALBUTEROL SULFATE 3 ML: .5; 3 SOLUTION RESPIRATORY (INHALATION) at 23:00

## 2018-04-15 RX ADMIN — INSULIN LISPRO 8 UNITS: 100 INJECTION, SOLUTION INTRAVENOUS; SUBCUTANEOUS at 21:33

## 2018-04-15 RX ADMIN — ROSUVASTATIN CALCIUM 10 MG: 5 TABLET, FILM COATED ORAL at 03:03

## 2018-04-15 RX ADMIN — INSULIN LISPRO 4 UNITS: 100 INJECTION, SOLUTION INTRAVENOUS; SUBCUTANEOUS at 16:35

## 2018-04-15 RX ADMIN — ACETAMINOPHEN 650 MG: 325 TABLET ORAL at 18:16

## 2018-04-15 RX ADMIN — INSULIN LISPRO 10 UNITS: 100 INJECTION, SOLUTION INTRAVENOUS; SUBCUTANEOUS at 08:30

## 2018-04-15 RX ADMIN — LEVOTHYROXINE SODIUM 100 MCG: 100 TABLET ORAL at 05:57

## 2018-04-15 RX ADMIN — METHYLPREDNISOLONE SODIUM SUCCINATE 60 MG: 125 INJECTION, POWDER, FOR SOLUTION INTRAMUSCULAR; INTRAVENOUS at 21:13

## 2018-04-15 RX ADMIN — METOPROLOL TARTRATE 25 MG: 25 TABLET ORAL at 18:16

## 2018-04-15 RX ADMIN — IPRATROPIUM BROMIDE AND ALBUTEROL SULFATE 3 ML: .5; 3 SOLUTION RESPIRATORY (INHALATION) at 20:34

## 2018-04-15 RX ADMIN — HEPARIN SODIUM 5000 UNITS: 5000 INJECTION, SOLUTION INTRAVENOUS; SUBCUTANEOUS at 21:34

## 2018-04-15 RX ADMIN — NYSTATIN: 100000 POWDER TOPICAL at 21:13

## 2018-04-15 RX ADMIN — IPRATROPIUM BROMIDE AND ALBUTEROL SULFATE 3 ML: .5; 3 SOLUTION RESPIRATORY (INHALATION) at 05:35

## 2018-04-15 RX ADMIN — INSULIN LISPRO 14 UNITS: 100 INJECTION, SOLUTION INTRAVENOUS; SUBCUTANEOUS at 06:25

## 2018-04-15 RX ADMIN — IPRATROPIUM BROMIDE AND ALBUTEROL SULFATE 3 ML: .5; 3 SOLUTION RESPIRATORY (INHALATION) at 07:09

## 2018-04-15 RX ADMIN — NIACIN 500 MG: 500 TABLET, EXTENDED RELEASE ORAL at 21:18

## 2018-04-15 RX ADMIN — IPRATROPIUM BROMIDE AND ALBUTEROL SULFATE 3 ML: .5; 3 SOLUTION RESPIRATORY (INHALATION) at 15:31

## 2018-04-15 RX ADMIN — NYSTATIN: 100000 POWDER TOPICAL at 12:33

## 2018-04-15 RX ADMIN — PANTOPRAZOLE SODIUM 40 MG: 40 TABLET, DELAYED RELEASE ORAL at 16:35

## 2018-04-15 RX ADMIN — NIACIN 500 MG: 500 TABLET, EXTENDED RELEASE ORAL at 03:03

## 2018-04-15 NOTE — ED NOTES
Pt continues with lower back pain. MD aware. Norco given per MD. Patient receiving breathing treatment at this time.

## 2018-04-15 NOTE — PROGRESS NOTES
PROGRESS NOTE     I was paged due to the patient having acute confusion and requiring more oxygen. Upon entering the room, the patient is alert but confused and agitated. When I asked her name she responded with the same question for me in an aggravated manner. She did not know where she was nor the year. She expressed no complaints other than I was bothering her. I previously had ordered an ABG, which shows a ph of 7.22 and a pCO2 of 69. I will transfer her to the ICU for Bipap and consult [facility] for assistance. I will also check a CT Head due to recent fall and a TSH, B12, RPR, & UA to rule out other concomitant cause of acute confusion.     Elke Christian, DO

## 2018-04-15 NOTE — PROGRESS NOTES
Dr. Lilly Javier at bedside. Critical ABG per RT, Anu Whittaker. Order to transfer pt to unit. Supervisor notified. Transfer to ICU bed 3101. Family updated.

## 2018-04-15 NOTE — PROGRESS NOTES
TRANSFER - IN REPORT:    Verbal report received from Manpreet Bansla, [facility] (name) on Rosario Fox  being received from [phone] (unit) for routine progression of care      Report consisted of patients Situation, Background, Assessment and   Recommendations(SBAR). Information from the following report(s) SBAR, Kardex, ED Summary, Procedure Summary, Intake/Output, MAR, Recent Results, Med Rec Status and Cardiac Rhythm NSR was reviewed with the receiving nurse. Opportunity for questions and clarification was provided. Assessment completed upon patients arrival to unit and care assumed.

## 2018-04-15 NOTE — PROGRESS NOTES
TRANSFER - OUT REPORT:    Verbal report given to Gilberto Jane RN on Angella Brooke  being transferred to ICU for routine progression of care. Report consisted of patients Situation, Background, Assessment and   Recommendations(SBAR). Information from the following report(s) SBAR was reviewed with the receiving nurse. Lines:   Peripheral IV 04/14/18 Right Wrist (Active)   Site Assessment Clean, dry, & intact 4/15/2018  1:45 AM   Phlebitis Assessment 0 4/15/2018  1:45 AM   Infiltration Assessment 0 4/15/2018  1:45 AM   Dressing Status Clean, dry, & intact 4/15/2018  1:45 AM   Dressing Type Transparent;Tape 4/15/2018  1:45 AM   Hub Color/Line Status Capped 4/15/2018  1:45 AM       Peripheral IV 04/15/18 Left Hand (Active)   Site Assessment Clean, dry, & intact 4/15/2018  7:11 AM   Phlebitis Assessment 0 4/15/2018  7:11 AM   Infiltration Assessment 0 4/15/2018  7:11 AM   Dressing Status Clean, dry, & intact 4/15/2018  7:11 AM   Dressing Type Transparent;Tape 4/15/2018  7:11 AM   Hub Color/Line Status Capped 4/15/2018  7:11 AM        Opportunity for questions and clarification was provided. Patient transported with:   Registered Nurse x2, RT, bi-pap, belongings, granddaughter at bedsid    Notified KATHIE Mclean patient still needs head XR, U/A collected, Zithromax started and rescheduled.

## 2018-04-15 NOTE — PROGRESS NOTES
Pt disoriented x4, SAT 88 on 5L O2 via n/c; flushed; coughing up thick, yellow sputum. RT and MD paged at this time.

## 2018-04-15 NOTE — PROGRESS NOTES
Granddaughter at bedside states she has been taking care of grandmother for x1 yr. Emergency contact does not have phone at this time. Ashley Staffordr (granddaughter): [phone].

## 2018-04-15 NOTE — ED TRIAGE NOTES
Pt presents to ED via [facility] c/o L side/back pain and bruising following a fall yesterday. Pt does have hx of knee replacement and broken ribs on L side from six months ago.

## 2018-04-15 NOTE — PROGRESS NOTES
Patient admitted to room 3101. Patient arouses to pain, called out for her family. Otherwise nonverbal and not interactive at this time. Disoriented to place, situation, and time. BIPAP 50%    Dual skin assessment completed with Lynne Lockett RN. Red rash noted to perianal, perineal, and under abdominal skin folds. Scab present between 4th and 5th digits on R foot. Bruising noted to L side/back, scars on both knees.     /78  HR 62

## 2018-04-15 NOTE — ED PROVIDER NOTES
HPI Comments: Patient presents to the ER complaining of left chest and left flank pain. States she had a mechanical fall yesterday and landed on her left side. States she did not hit her head and denies any loss of consciousness. Reports pain had been manageable until earlier today when she felt a \"ppop\" in the left side. States since then pain is worsened. EMS was called. Upon arrival to ER patient noted to be wheezing and hypoxic. Patient is a 75 y.o. female presenting with fall. The history is provided by the patient. Fall   The accident occurred yesterday. The fall occurred while walking. She fell from a height of ground level. Point of impact: left flank and chest. Pain location: lleft flank and chest. The pain is at a severity of 5/10. She was ambulatory at the scene. Pertinent negatives include no fever, no numbness, no abdominal pain, no nausea, no vomiting, no headaches, no loss of consciousness, no tingling and no laceration. Past Medical History:   Diagnosis Date    Anxiety     managed with medication     Arthritis     ASCAD - of the native vessel 2/27/2013    Asthma     CAD (coronary artery disease)     stents x 3    Cancer (HCC)     hx uterine cancer    Chronic pain     d/t fibromyalgia    Claustrophobia     COPD     PRN inhaler; uses once every 2-3 months    Current smoker     Degenerative joint disease     Depression     managed with medication     Diabetes ([address]     type 2; normal fasting bs (  );  Insulin dependent; checks bs 4 times per day    Diverticulosis     managed with diet     DM (diabetes mellitus) w/o complication 6/61/7152    Dyslipidemia     Fatty liver     Fibromyalgia     GERD (gastroesophageal reflux disease)     controlled w/med    H/O echocardiogram 01/20/14    EF >69.7%    HTN (hypertension) - controlled, benign 2/27/2013    Hyperlipemia     managed with medication     Hypertension     controlled w/med    Hypothyroidism     managed with medication     IBS (irritable bowel syndrome)     w/diverticulitis    Lipid - hyperlipidemia other unsp dyslipidemia 6/9/2016    Macular degeneration     Mass of kidney     Murmur     monitored by Cardiologist, Bj Cote; last echo 1/20/14    Myalgia and myositis, unspecified     no present treatment     Obesity     BMI 39.1    Osteoarthrosis, unspecified whether generalized or localized, unspecified site     no present treatment     Psychiatric disorder     anxiety/depression    PUD (peptic ulcer disease) 1980    Seizures ([facility] [address] 12/2011    s/p stopping xanax abruptly    Sleep apnea     noncompliant with CPAP    Syncope and collapse 6/9/2016    Tobacco use disorder 6/9/2016    Urinary, incontinence, stress female     w/ cystocele-rectocele repair    Vertigo     managed with prn medication        Past Surgical History:   Procedure Laterality Date    CARDIAC SURG PROCEDURE UNLIST      3 caths total; stents x3, last stent 02/2014    COLONOSCOPY      HX ANKLE FRACTURE TX Right     repair with hardware    HX APPENDECTOMY      HX BREAST LUMPECTOMY Bilateral     HX CARPAL TUNNEL RELEASE Right     HX HYSTERECTOMY  1979    HX KNEE ARTHROSCOPY Right     HX KNEE REPLACEMENT Left     HX LAP CHOLECYSTECTOMY      HX TUBAL LIGATION      HX UROLOGICAL      sling         Family History:   Problem Relation Age of Onset    Heart Attack Mother      MI age 70    Diabetes Mother     Heart Disease Mother     Heart Disease Sister     Heart Failure Sister 61     cabg    Diabetes Sister     Heart Disease Brother     Heart Attack Brother 28     mi    Cancer Brother     Heart Failure Sister 61     cabg    Heart Disease Sister     Cancer Father     Alzheimer Father     Heart Attack Brother        Social History     Social History    Marital status:      Spouse name: N/A    Number of children: N/A    Years of education: N/A     Occupational History    Not on file.      Social History Main Topics    Smoking status: Current Every Day Smoker     Packs/day: 0.25     Years: 53.00    Smokeless tobacco: Never Used    Alcohol use No    Drug use: No    Sexual activity: Not on file     Other Topics Concern    Not on file     Social History Narrative         ALLERGIES: Lisinopril and Oxycodone    Review of Systems   Constitutional: Negative for fatigue, fever and unexpected weight change. HENT: Negative for congestion and dental problem. Eyes: Negative for photophobia, redness and visual disturbance. Respiratory: Positive for shortness of breath and wheezing. Negative for chest tightness. Cardiovascular: Positive for chest pain. Negative for palpitations and leg swelling. Gastrointestinal: Negative for abdominal pain, nausea and vomiting. Endocrine: Negative for polydipsia and polyphagia. Genitourinary: Negative for frequency. Musculoskeletal: Positive for back pain. Negative for gait problem. Allergic/Immunologic: Negative for food allergies and immunocompromised state. Neurological: Negative for tingling, loss of consciousness, light-headedness, numbness and headaches. Hematological: Negative for adenopathy. Does not bruise/bleed easily. Psychiatric/Behavioral: Negative for behavioral problems. All other systems reviewed and are negative. Vitals:    04/14/18 2049 04/14/18 2050   BP: (!) 166/100    Pulse: 69    Resp: 22    Temp: 98.1 °F (36.7 °C)    SpO2: (!) 72% 94%   Weight: 111.1 kg (245 lb)    Height: 5' 6\" (1.676 m)             Physical Exam   Constitutional: She is oriented to person, place, and time. She appears well-developed and well-nourished. HENT:   Head: Normocephalic and atraumatic. Mouth/Throat: Oropharynx is clear and moist.   Eyes: Conjunctivae and EOM are normal. Pupils are equal, round, and reactive to light. Cardiovascular: Normal rate, regular rhythm and intact distal pulses. Pulmonary/Chest: Effort normal. She has wheezes. Abdominal: Soft. Bowel sounds are normal.   Musculoskeletal: Normal range of motion. She exhibits no edema or deformity. Neurological: She is alert and oriented to person, place, and time. She has normal reflexes. Skin: No laceration noted. Nursing note and vitals reviewed. MDM  Number of Diagnoses or Management Options  Diagnosis management comments: Will treat with nebs here, history of COPD. Also will obtain x-ray of chest and left ribs to rule out any pneumothorax or other traumatic injuries    10:31 PM  Chest x-ray is read as normal.  Normal basic labs here. Patient still with significant wheezing and hypoxia. We'll give another neb treatment as well as Solu-Medrol    11:04 PM  Patient still with significant wheezing.   Will discuss case with hospitalist for admission       Amount and/or Complexity of Data Reviewed  Clinical lab tests: ordered and reviewed  Tests in the radiology section of CPT®: reviewed and ordered    Risk of Complications, Morbidity, and/or Mortality  Presenting problems: high  Diagnostic procedures: moderate  Management options: moderate    Patient Progress  Patient progress: improved        ED Course       Procedures

## 2018-04-15 NOTE — PROGRESS NOTES
Pt and family oriented to room and call light; verbalized understanding. Dual skin assessment completed with Harinder Grace RN. Integumentary WNL except for abrasions to ears, neck, and upper back. States she has abrasions all over her head from scratching and has itched ever since she had head lice x4 years ago. Dr. Debora Bruno notified; pt placed on contact precautions to include hair nets. No pressure ulcers at this time. Bed L/L, call light/ personal items within reach, family at bedside, SR up x2. Will continue to monitor.

## 2018-04-15 NOTE — PROGRESS NOTES
CT called in regards to an available time for CT Head. CT tech states that they are backed up but will call when they are able to schedule her in.

## 2018-04-15 NOTE — ED NOTES
TRANSFER - OUT REPORT:    Verbal report given to Treva Yin RN(name) on [facility]  being transferred to [facility](unit) for routine progression of care       Report consisted of patients Situation, Background, Assessment and   Recommendations(SBAR). Information from the following report(s) SBAR, ED Summary and Recent Results was reviewed with the receiving nurse. Lines:   Peripheral IV 04/14/18 Right Wrist (Active)   Site Assessment Clean, dry, & intact 4/14/2018  9:17 PM   Phlebitis Assessment 0 4/14/2018  9:17 PM   Infiltration Assessment 0 4/14/2018  9:17 PM   Dressing Status Clean, dry, & intact 4/14/2018  9:17 PM   Hub Color/Line Status Blue 4/14/2018  9:17 PM        Opportunity for questions and clarification was provided.

## 2018-04-15 NOTE — PROGRESS NOTES
Patient on 6 L high flow nasal cannula, family at bedside. Patient alert to person and place, reoriented to situation and time. STAND screen passed. Resuming Diabetic Diet at this time.

## 2018-04-15 NOTE — CONSULTS
CONSULT NOTE    Alie Pickering    4/15/2018    Date of Admission:  4/14/2018    The patient's chart is reviewed and the patient is discussed with the staff. Subjective:     Patient is a 75 y.o.  female seen and evaluated at the request of Dr. Ralph Singleton. Patient has a history of recurrent/persistent lice, DM, hypothyroidism, GERD, IBS, HTN, HL, CAD, JEREMÍAS on O2 at 3 lpm with sleep, COPD with rescue inhaler only, and tobacco abuse (~50 pack year history). She sustained a fall at home 2 days prior to presentation, was unable to walk and finally allowed her family to call EMS. They also report that she has has had increased sob and was recently prescribed a Corbin Most which they had not yet picked up from the pharmacy. In the ER she was hypoxic requiring O2 at 5 lpm to maintain sats in the 90s and had ongoing wheezing despite treatment and was admitted for continued care. No rib or hip fx on imaging. Patient developed increased confusion and worsening hypoxia and RR was called. Family also states that patient c/o HA. Patient was placed on BIPAP and transferred to ICU for continued care. She is a DNR    Currently on BIPAP    Ventilator Settings  Mode FIO2 Rate Tidal Volume Pressure PEEP      45 %                 Peak airway pressure:     Minute ventilation: 10.5 l/min     ABG:   Recent Labs      04/15/18   1050  04/15/18   0620   PH  7.21*  7.22*   PCO2  80*  69*   PO2  79*  108*   HCO3  31*  28*       BIPAP settings adjusted based on most recent ABG results    Review of Systems  Review of systems not obtained due to patient factors.     Patient Active Problem List   Diagnosis Code    Status post total knee replacement Z96.659    DM (diabetes mellitus) w/o complication A75.6    HTN (hypertension) - controlled, benign I10    COPD (chronic obstructive pulmonary disease) (HCC) J44.9    Hypothyroidism E03.9    ASCAD - of the native vessel I25.110    Hypotension I95.9    Esophageal reflux K21.9    Stable angina (HCC) I20.8    Chronic total occlusion of coronary artery(414.2) I25.82    Cystocele [id number]    Female stress incontinence N39.3    Lipid - hyperlipidemia other unsp dyslipidemia E78.5    Tobacco use disorder Z72.0    Syncope and collapse R55    Pre-op examination Z01.818    Osteoarthritis M19.90    S/P total knee arthroplasty Z96.659    Dyslipidemia E78.5    Acute respiratory failure (HCC) J96.00       Prior to Admission Medications   Prescriptions Last Dose Informant Patient Reported? Taking? ALPRAZolam (XANAX) 2 mg tablet   Yes No   Sig: Take 2 mg by mouth four (4) times daily. Take / use AM day of surgery  per anesthesia protocols. Indications: ANXIETY WITH DEPRESSION   Insulin Lisp & Lisp Prot, Hum, (HUMALOG 75-25 MIX) 100 unit/mL (75-25) inpn   Yes No   Si Units by SubCUTAneous route two (2) times a day. Take / use half dose AM day of surgery  per anesthesia protocols. Indications: TYPE 2 DIABETES MELLITUS   albuterol (PROAIR HFA) 90 mcg/actuation inhaler   Yes No   Sig: Take 2 Puffs by inhalation every four (4) hours as needed. Take / use AM day of surgery  per anesthesia protocols if needed   aspirin 81 mg tablet 2018 at Unknown time  Yes Yes   Sig: Take 81 mg by mouth daily. Take / use AM day of surgery  per anesthesia protocols. clopidogrel (PLAVIX) 75 mg tab 2018 at Unknown time  No Yes   Sig: Take 1 Tab by mouth daily. diclofenac (VOLTAREN) 1 % gel   Yes Yes   Sig: Apply  to affected area four (4) times daily. Indications: OSTEOARTHRITIS OF THE KNEE   escitalopram (LEXAPRO) 20 mg tablet 2018 at Unknown time  Yes Yes   Sig: Take 20 mg by mouth daily. Take / use AM day of surgery  per anesthesia protocols. Indications: ANXIETY WITH DEPRESSION   fenofibrate micronized (LOFIBRA) 200 mg capsule 2018 at Unknown time  No Yes   Sig: TAKE ONE CAPSULE BY MOUTH EACH NIGHT AT BEDTIME FOR TRIGLYCERIDES AND CHOLESTEROL.    guaiFENesin ([address]) 1,200 mg Ta12 ER tablet 3/15/2018 at Unknown time  Yes Yes   Sig: Take 1,200 mg by mouth two (2) times a day. Indications: COLD SYMPTOMS, Cough   insulin glargine (LANTUS) 100 unit/mL injection   Yes No   Si Units by SubCUTAneous route nightly. Indications: TYPE 2 DIABETES MELLITUS   levothyroxine (SYNTHROID) 100 mcg tablet 2018 at Unknown time  Yes Yes   Sig: Take 100 mcg by mouth Daily (before breakfast). Per anesthesia protocol:instructed to take am of surgery. Indications: HYPOTHYROIDISM   loperamide (IMMODIUM) 2 mg tablet   Yes No   Sig: Take 2 mg by mouth four (4) times daily as needed for Diarrhea.   losartan (COZAAR) 25 mg tablet   No No   Sig: TAKE ONE TABLET BY MOUTH EVERY DAY FOR BLOOD PRESSURE   meclizine (ANTIVERT) 25 mg tablet Unknown at Unknown time  Yes No   Sig: Take 25 mg by mouth three (3) times daily as needed. Indications: VERTIGO   metoprolol tartrate (LOPRESSOR) 25 mg tablet   No No   Sig: Take 1 Tab by mouth two (2) times a day. niacin ER (NIASPAN) 500 mg tablet 2018 at Unknown time  No Yes   Sig: TAKE ONE TABLET BY MOUTH ONCE DAILY FOR TRIGLYCERIDES AND CHOLESTEROL. nitroglycerin (NITROSTAT) 0.4 mg SL tablet   Yes No   Si.4 mg by SubLINGual route every five (5) minutes as needed. Take / use AM day of surgery  per anesthesia protocols if needed   pantoprazole (PROTONIX) 40 mg tablet 2018 at Unknown time  Yes Yes   Sig: Take 40 mg by mouth two (2) times a day. Take / use AM day of surgery  per anesthesia protocols. Indications: GASTROESOPHAGEAL REFLUX   pioglitazone (ACTOS) 30 mg tablet   Yes Yes   Sig: Take 30 mg by mouth daily. Indications: type 2 diabetes mellitus   rosuvastatin (CRESTOR) 10 mg tablet 2018 at Unknown time  Yes Yes   Sig: Take 10 mg by mouth nightly.  Indications: HYPERCHOLESTEROLEMIA      Facility-Administered Medications: None       Past Medical History:   Diagnosis Date    Anxiety     managed with medication     Arthritis     ASCAD - of the native vessel 2/27/2013    Asthma     CAD (coronary artery disease)     stents x 3    Cancer (HCC)     hx uterine cancer    Chronic pain     d/t fibromyalgia    Claustrophobia     COPD     PRN inhaler; uses once every 2-3 months    Current smoker     Degenerative joint disease     Depression     managed with medication     Diabetes ([facility] [address]     type 2; normal fasting bs (  );  Insulin dependent; checks bs 4 times per day    Diverticulosis     managed with diet     DM (diabetes mellitus) w/o complication 7/88/9680    Dyslipidemia     Fatty liver     Fibromyalgia     GERD (gastroesophageal reflux disease)     controlled w/med    H/O echocardiogram 01/20/14    EF >69.7%    HTN (hypertension) - controlled, benign 2/27/2013    Hyperlipemia     managed with medication     Hypertension     controlled w/med    Hypothyroidism     managed with medication     IBS (irritable bowel syndrome)     w/diverticulitis    Lipid - hyperlipidemia other unsp dyslipidemia 6/9/2016    Macular degeneration     Mass of kidney     Murmur     monitored by Cardiologist, Tala Blakely; last echo 1/20/14    Myalgia and myositis, unspecified     no present treatment     Obesity     BMI 39.1    Osteoarthrosis, unspecified whether generalized or localized, unspecified site     no present treatment     Psychiatric disorder     anxiety/depression    PUD (peptic ulcer disease) 1980    Seizures ([facility] [address] 12/2011    s/p stopping xanax abruptly    Sleep apnea     noncompliant with CPAP    Syncope and collapse 6/9/2016    Tobacco use disorder 6/9/2016    Urinary, incontinence, stress female     w/ cystocele-rectocele repair    Vertigo     managed with prn medication      Past Surgical History:   Procedure Laterality Date    CARDIAC SURG PROCEDURE UNLIST      3 caths total; stents x3, last stent 02/2014    COLONOSCOPY      HX ANKLE FRACTURE TX Right     repair with hardware    HX APPENDECTOMY      HX BREAST LUMPECTOMY Bilateral     HX CARPAL TUNNEL RELEASE Right     HX HYSTERECTOMY  1979    HX KNEE ARTHROSCOPY Right     HX KNEE REPLACEMENT Left     HX LAP CHOLECYSTECTOMY      HX TUBAL LIGATION      HX UROLOGICAL      sling     Social History     Social History    Marital status:      Spouse name: N/A    Number of children: N/A    Years of education: N/A     Occupational History    Not on file.      Social History Main Topics    Smoking status: Current Every Day Smoker     Packs/day: 0.25     Years: 53.00    Smokeless tobacco: Never Used    Alcohol use No    Drug use: No    Sexual activity: Not on file     Other Topics Concern    Not on file     Social History Narrative     Family History   Problem Relation Age of Onset    Heart Attack Mother      MI age 70    Diabetes Mother     Heart Disease Mother     Heart Disease Sister     Heart Failure Sister 61     cabg    Diabetes Sister     Heart Disease Brother     Heart Attack Brother 28     mi    Cancer Brother     Heart Failure Sister 61     cabg    Heart Disease Sister     Cancer Father     Alzheimer Father     Heart Attack Brother      Allergies   Allergen Reactions    Lisinopril Swelling     Throat swelling      Oxycodone Rash       Current Facility-Administered Medications   Medication Dose Route Frequency    sodium chloride (NS) flush 5-10 mL  5-10 mL IntraVENous Q8H    sodium chloride (NS) flush 5-10 mL  5-10 mL IntraVENous PRN    methylPREDNISolone (PF) (SOLU-MEDROL) injection 60 mg  60 mg IntraVENous Q12H    acetaminophen (TYLENOL) tablet 650 mg  650 mg Oral Q4H PRN    heparin (porcine) injection 5,000 Units  5,000 Units SubCUTAneous Q8H    albuterol-ipratropium (DUO-NEB) 2.5 MG-0.5 MG/3 ML  3 mL Nebulization Q4H RT    aspirin delayed-release tablet 81 mg  81 mg Oral DAILY    clopidogrel (PLAVIX) tablet 75 mg  75 mg Oral DAILY    levothyroxine (SYNTHROID) tablet 100 mcg  100 mcg Oral ACB    loperamide (IMODIUM) capsule 2 mg  2 mg Oral Q4H PRN    losartan (COZAAR) tablet 25 mg  25 mg Oral DAILY    metoprolol tartrate (LOPRESSOR) tablet 25 mg  25 mg Oral BID    niacin ER (NIASPAN) tablet 500 mg  500 mg Oral QHS    nitroglycerin (NITROSTAT) tablet 0.4 mg  0.4 mg SubLINGual PRN    pantoprazole (PROTONIX) tablet 40 mg  40 mg Oral ACB&D    rosuvastatin (CRESTOR) tablet 10 mg  10 mg Oral QHS    insulin lispro (HUMALOG) injection   SubCUTAneous AC&HS    azithromycin (ZITHROMAX) 500 mg in 0.9% sodium chloride (MBP/ADV) 250 mL  500 mg IntraVENous Q24H    insulin glargine (LANTUS) injection 60 Units  60 Units SubCUTAneous QHS         Objective:     Vitals:    04/15/18 1031 04/15/18 1033 04/15/18 1040 04/15/18 1042   BP: 124/58      Pulse: (!) 58 60     Resp: 12 17     Temp:       SpO2: 95% 95% 96% 96%   Weight:       Height:           PHYSICAL EXAM     Constitutional:  the patient is well developed and in no acute distress  EENMT:  Sclera clear, pupils equal, oral mucosa moist  Respiratory: diminished bilaterally, BIPAP  Cardiovascular:  RRR without M,G,R  Gastrointestinal: soft and non-tender; with positive bowel sounds. Musculoskeletal: warm without cyanosis. There is no lower leg edema. Skin:  no jaundice, + fungal rash under pannus, no open wounds - slight bruising to L flank   Neurologic: no gross neuro deficits     Psychiatric:  Opens eyes to voice    CXR:    4/15          Recent Labs      04/15/18   0540  04/14/18 2124   WBC  8.7  8.2   HGB  12.9  14.4   HCT  40.1  43.8   PLT  222  205   INR   --   1.0     Recent Labs      04/15/18   0540  04/14/18   2124   NA  135*  140   K  4.5  4.4   CL  96*  101   GLU  505*  186*   CO2  32  32   BUN  14  11   CREA  1.03*  0.79   CA  8.6  9.2   ALB   --   3.2   TBILI   --   0.5   ALT   --   19   SGOT   --   30     Recent Labs      04/15/18   0620   PH  7.22*   PCO2  69*   PO2  108*   HCO3  28*     No results for input(s): LCAD, LAC in the last 72 hours.     Assessment:  (Medical Decision Making)     Hospital Problems  Date Reviewed: 3/23/2017          Codes Class Noted POA    Head lice infestation (Chronic) ICD-10-CM: B85.0  ICD-9-CM: 132.0  4/15/2018 Yes    On isolation      Tobacco abuse (Chronic) ICD-10-CM: Z72.0  ICD-9-CM: 305.1  4/15/2018 Yes    50 pack year history        Nocturnal hypoxemia (Chronic) ICD-10-CM: G47.34  ICD-9-CM: 327.24  4/15/2018 Yes    Maintained on o2 at 3 lpm with sleep as an outpatient  Occasionally wears during the day for sob      Fall ICD-10-CM: W19. [place]  ICD-9-CM: E888.9  4/15/2018 Yes    Teresa Tlyer 3 days prior to presentation - tripped over her feet per family report  cxr / hip xray without fractures  Has had increased flank pain and splinting since fall      * (Principal)Acute respiratory failure ([facility] ICD-10-CM: J96.00  ICD-9-CM: 518.81  4/14/2018 Unknown    Currently on BIPAP      DM (diabetes mellitus) w/o complication (Chronic) EDNA-22-UA: E11.9  ICD-9-CM: 250.00  2/27/2013 Yes     BS in the 400s  lantus / SSI  Hgb A1c 8.9         COPD (chronic obstructive pulmonary disease) ([facility] ICD-10-CM: J44.9  ICD-9-CM: 496  2/27/2013 Yes    Not currently wheezing but only moving minimal air  Nebs / steroids      Hypothyroidism (Chronic) ICD-10-CM: E03.9  ICD-9-CM: 244.9  2/27/2013 Yes    On supplement      ASCAD - of the native vessel ICD-10-CM: I25.110  ICD-9-CM: 414.01, 411.1  2/27/2013 Yes             BNP on admission 184    Plan:  (Medical Decision Making)     --Duo-neb  --Zithromax   WBC 8.7    PCT 0.1  --solumedrol 60 mg IV q 12 hours  --ABG on BIPAP with ongoing hypercarbia - settings adjusted with plans for 1 hour follow up abg  --Discussed category status with family and patient is a DNR    More than 50% of the time documented was spent in face-to-face contact with the patient and in the care of the patient on the floor/unit where the patient is located.     Thank you very much for this referral.  We appreciate the opportunity to participate in this patient's care.  Will follow along with above stated plan. Narinder Fontaine NP      Lungs: scant wheezing but decreased air movement. Heart S1 and S2 audible, no murmers or rubs appreciated  Other     Adjusted BIPAP yesterday and TV about 350-400 now. ABG noted and increasing PCO2 earlier and now will f/u with ABG. Family aware will not change the DNR that was already in the chart and patient requested. Did see Dr. Ted Segura and agree to honor patient's DNR    I have spoken with and examined the patient. I have reviewed the history, examination, assessment, and plan and agree with the above. Preet Byrne MD      This note was signed electronically.

## 2018-04-15 NOTE — PROGRESS NOTES
Family at bedside. Requesting to talk to physician about code status change. Pt c/o of pain and requesting morphine and nightly xanax. Dr. Candelario Rater notified and to come to bedside. Pt alert and oriented at present. Pt states she wears 3L oxygen at home and is supposed to wear a CPAP but does not.

## 2018-04-15 NOTE — PROGRESS NOTES
Hospitalist Progress Note    Subjective:   Daily Progress Note: 4/15/2018 1:51 PM    Ms. Abdoul Arambula is a 74 yo obese white female, with a pmh of COPD on baseline oxygen around 3 liters NC, diabetes and CAD, who presented via EMS on 4/14 for shortness of breath, productive cough and decreased responsiveness for the past several days and was admitted for acute copd exacerbation. She fell a few days prior to admission and hip fracture and cxr negative for hip/rib fractures. She was admitted to the floor on nebs, solumedrol and zithromax but became acutely altered and found to be hypercapnic with CO2 on ABG of 69 and acidotic. Transferred to ICU for bipap which she is still on and still acidotic, hypercapneic. Expressed to Dr. Mahamed Chaparro that she wished to be DNR status and order is placed on chart.       Current Facility-Administered Medications   Medication Dose Route Frequency    sodium chloride (NS) flush 5-10 mL  5-10 mL IntraVENous Q8H    sodium chloride (NS) flush 5-10 mL  5-10 mL IntraVENous PRN    methylPREDNISolone (PF) (SOLU-MEDROL) injection 60 mg  60 mg IntraVENous Q12H    acetaminophen (TYLENOL) tablet 650 mg  650 mg Oral Q4H PRN    heparin (porcine) injection 5,000 Units  5,000 Units SubCUTAneous Q8H    albuterol-ipratropium (DUO-NEB) 2.5 MG-0.5 MG/3 ML  3 mL Nebulization Q4H RT    aspirin delayed-release tablet 81 mg  81 mg Oral DAILY    clopidogrel (PLAVIX) tablet 75 mg  75 mg Oral DAILY    levothyroxine (SYNTHROID) tablet 100 mcg  100 mcg Oral ACB    loperamide (IMODIUM) capsule 2 mg  2 mg Oral Q4H PRN    losartan (COZAAR) tablet 25 mg  25 mg Oral DAILY    metoprolol tartrate (LOPRESSOR) tablet 25 mg  25 mg Oral BID    niacin ER (NIASPAN) tablet 500 mg  500 mg Oral QHS    nitroglycerin (NITROSTAT) tablet 0.4 mg  0.4 mg SubLINGual PRN    pantoprazole (PROTONIX) tablet 40 mg  40 mg Oral ACB&D    rosuvastatin (CRESTOR) tablet 10 mg  10 mg Oral QHS    insulin lispro (HUMALOG) injection SubCUTAneous AC&HS    azithromycin (ZITHROMAX) 500 mg in 0.9% sodium chloride (MBP/ADV) 250 mL  500 mg IntraVENous Q24H    nystatin (MYCOSTATIN) 100,000 unit/gram powder   Topical BID    insulin glargine (LANTUS) injection 80 Units  80 Units SubCUTAneous QHS    pyrethrins-piperonyl butoxide (LICE TREATMENT) 3.56-3 % shampoo   Topical ONCE        Review of Systems  Review of systems not obtained due to patient factors. Objective:     Visit Vitals    /58    Pulse (!) 55    Temp 97.5 °F (36.4 °C)    Resp 21    Ht 5' 6\" (1.676 m)    Wt 107.1 kg (236 lb 1.8 oz)    SpO2 94%    BMI 38.11 kg/m2    O2 Flow Rate (L/min): 5 l/min O2 Device: BIPAP    Temp (24hrs), Av.7 °F (36.5 °C), Min:97.3 °F (36.3 °C), Max:98.1 °F (36.7 °C)      04/15 0701 - 04/15 1900  In: -   Out: 650 [Urine:650]       General: on bipap, overweight  Eyes; non icteric  Neck; supple  CV: RRR  Pulm; course, expiratory wheezing, mildly labored  Abd; soft, non tender    Additional comments:I reviewed the patient's new clinical lab test results. j    Data Review    Recent Results (from the past 24 hour(s))   BNP    Collection Time: 18  9:23 PM   Result Value Ref Range     pg/mL   CBC WITH AUTOMATED DIFF    Collection Time: 18  9:24 PM   Result Value Ref Range    WBC 8.2 4.3 - 11.1 K/uL    RBC 4.83 4.05 - 5.25 M/uL    HGB 14.4 11.7 - 15.4 g/dL    HCT 43.8 35.8 - 46.3 %    MCV 90.7 79.6 - 97.8 FL    MCH 29.8 26.1 - 32.9 PG    MCHC 32.9 31.4 - 35.0 g/dL    RDW 12.6 11.9 - 14.6 %    PLATELET 614 980 - 122 K/uL    MPV 11.2 10.8 - 14.1 FL    DF AUTOMATED      NEUTROPHILS 58 43 - 78 %    LYMPHOCYTES 29 13 - 44 %    MONOCYTES 7 4.0 - 12.0 %    EOSINOPHILS 4 0.5 - 7.8 %    BASOPHILS 1 0.0 - 2.0 %    IMMATURE GRANULOCYTES 1 0.0 - 5.0 %    ABS. NEUTROPHILS 4.8 1.7 - 8.2 K/UL    ABS. LYMPHOCYTES 2.4 0.5 - 4.6 K/UL    ABS. MONOCYTES 0.6 0.1 - 1.3 K/UL    ABS. EOSINOPHILS 0.3 0.0 - 0.8 K/UL    ABS.  BASOPHILS 0.1 0.0 - 0.2 K/UL ABS. IMM. GRANS. 0.1 0.0 - 0.5 K/UL   METABOLIC PANEL, COMPREHENSIVE    Collection Time: 04/14/18  9:24 PM   Result Value Ref Range    Sodium 140 136 - 145 mmol/L    Potassium 4.4 3.5 - 5.1 mmol/L    Chloride 101 98 - 107 mmol/L    CO2 32 21 - 32 mmol/L    Anion gap 7 7 - 16 mmol/L    Glucose 186 (H) 65 - 100 mg/dL    BUN 11 8 - 23 MG/DL    Creatinine 0.79 0.6 - 1.0 MG/DL    GFR est AA >60 >60 ml/min/1.73m2    GFR est non-AA >60 >60 ml/min/1.73m2    Calcium 9.2 8.3 - 10.4 MG/DL    Bilirubin, total 0.5 0.2 - 1.1 MG/DL    ALT (SGPT) 19 12 - 65 U/L    AST (SGOT) 30 15 - 37 U/L    Alk.  phosphatase 93 50 - 136 U/L    Protein, total 7.7 6.3 - 8.2 g/dL    Albumin 3.2 3.2 - 4.6 g/dL    Globulin 4.5 (H) 2.3 - 3.5 g/dL    A-G Ratio 0.7 (L) 1.2 - 3.5     PROTHROMBIN TIME + INR    Collection Time: 04/14/18  9:24 PM   Result Value Ref Range    Prothrombin time 13.1 11.5 - 14.5 sec    INR 1.0     PROCALCITONIN    Collection Time: 04/15/18 12:00 AM   Result Value Ref Range    Procalcitonin 0.1 ng/mL   HEMOGLOBIN A1C WITH EAG    Collection Time: 04/15/18  1:15 AM   Result Value Ref Range    Hemoglobin A1c 8.9 (H) 4.8 - 6.0 %    Est. average glucose 209 mg/dL   TSH 3RD GENERATION    Collection Time: 04/15/18  5:39 AM   Result Value Ref Range    TSH 0.739 0.358 - 3.740 uIU/mL   VITAMIN B12    Collection Time: 04/15/18  5:39 AM   Result Value Ref Range    Vitamin B12 576 193 - 814 pg/mL   METABOLIC PANEL, BASIC    Collection Time: 04/15/18  5:40 AM   Result Value Ref Range    Sodium 135 (L) 136 - 145 mmol/L    Potassium 4.5 3.5 - 5.1 mmol/L    Chloride 96 (L) 98 - 107 mmol/L    CO2 32 21 - 32 mmol/L    Anion gap 7 7 - 16 mmol/L    Glucose 505 (HH) 65 - 100 mg/dL    BUN 14 8 - 23 MG/DL    Creatinine 1.03 (H) 0.6 - 1.0 MG/DL    GFR est AA >60 >60 ml/min/1.73m2    GFR est non-AA 57 (L) >60 ml/min/1.73m2    Calcium 8.6 8.3 - 10.4 MG/DL   CBC WITH AUTOMATED DIFF    Collection Time: 04/15/18  5:40 AM   Result Value Ref Range    WBC 8.7 4.3 - 11.1 K/uL    RBC 4.35 4.05 - 5.25 M/uL    HGB 12.9 11.7 - 15.4 g/dL    HCT 40.1 35.8 - 46.3 %    MCV 92.2 79.6 - 97.8 FL    MCH 29.7 26.1 - 32.9 PG    MCHC 32.2 31.4 - 35.0 g/dL    RDW 12.7 11.9 - 14.6 %    PLATELET 737 715 - 849 K/uL    MPV 11.4 10.8 - 14.1 FL    DF AUTOMATED      NEUTROPHILS 89 (H) 43 - 78 %    LYMPHOCYTES 7 (L) 13 - 44 %    MONOCYTES 2 (L) 4.0 - 12.0 %    EOSINOPHILS 0 (L) 0.5 - 7.8 %    BASOPHILS 0 0.0 - 2.0 %    IMMATURE GRANULOCYTES 2 0.0 - 5.0 %    ABS. NEUTROPHILS 7.7 1.7 - 8.2 K/UL    ABS. LYMPHOCYTES 0.6 0.5 - 4.6 K/UL    ABS. MONOCYTES 0.1 0.1 - 1.3 K/UL    ABS. EOSINOPHILS 0.0 0.0 - 0.8 K/UL    ABS. BASOPHILS 0.0 0.0 - 0.2 K/UL    ABS. IMM. GRANS. 0.2 0.0 - 0.5 K/UL   MAGNESIUM    Collection Time: 04/15/18  5:40 AM   Result Value Ref Range    Magnesium 2.0 1.8 - 2.4 mg/dL   GLUCOSE, POC    Collection Time: 04/15/18  6:11 AM   Result Value Ref Range    Glucose (POC) 460 (HH) 65 - 100 mg/dL   BLOOD GAS & LYTES, ARTERIAL    Collection Time: 04/15/18  6:20 AM   Result Value Ref Range    pH 7.22 (L) 7.35 - 7.45      PCO2 69 (H) 35 - 45 mmHg    PO2 108 (H) 80 - 105 mmHg    BICARBONATE 28 (H) 22 - 26 mmol/L    BASE DEFICIT 1.8 0 - 2 mmol/L    TOTAL HEMOGLOBIN 13.9 11.7 - 15.0 GM/DL    O2 SAT 97 92 - 98.5 %    Arterial O2 Hgb 95.4 94 - 97 %    CARBOXYHEMOGLOBIN 1.3 0.5 - 1.5 %    METHEMOGLOBIN 0.4 0.0 - 1.5 %    DEOXYHEMOGLOBIN 3 0.0 - 5.0 %    Sodium 132.3 (L) 135 - 148 MMOL/L    POTASSIUM 4.54 3.5 - 5.3 MMOL/L    CHLORIDE 94 (L) 98 - 106      Calcium, ionized 1.14 1.0 - 1.3 mmol/L    SITE LR     ALLENS TEST POSITIVE      MODE NC     FIO2 60.0 %    O2 FLOW 10.00 L/min    Respiratory comment: KATHIE Jang at 4 15 2018 6 29 33 AM. Read back.     GLUCOSE, POC    Collection Time: 04/15/18  7:39 AM   Result Value Ref Range    Glucose (POC) 427 (H) 65 - 100 mg/dL   BLOOD GAS, ARTERIAL    Collection Time: 04/15/18 10:50 AM   Result Value Ref Range    pH 7.21 (L) 7.35 - 7.45      PCO2 80 (H) 35 - 45 mmHg PO2 79 (L) 80 - 105 mmHg    BICARBONATE 31 (H) 22 - 26 mmol/L    BASE EXCESS 0.6 0 - 3 mmol/L    TOTAL HEMOGLOBIN 13.8 11.7 - 15.0 GM/DL    O2 SAT 94 92 - 98.5 %    Arterial O2 Hgb 92.4 (L) 94 - 97 %    CARBOXYHEMOGLOBIN 0.9 0.5 - 1.5 %    METHEMOGLOBIN 0.5 0.0 - 1.5 %    DEOXYHEMOGLOBIN 6 (H) 0.0 - 5.0 %    SITE LR     ALLENS TEST POSITIVE      MODE BIPAP 18 8     FIO2 45.0 %    Respiratory comment: Dr. Lisa Wu at 4 15 2018 10 57 11 AM. Read back. GLUCOSE, POC    Collection Time: 04/15/18 11:28 AM   Result Value Ref Range    Glucose (POC) 363 (H) 65 - 100 mg/dL   BLOOD GAS, ARTERIAL    Collection Time: 04/15/18 12:10 PM   Result Value Ref Range    pH 7.24 (L) 7.35 - 7.45      PCO2 75 (H) 35 - 45 mmHg    PO2 73 (L) 80 - 105 mmHg    BICARBONATE 31 (H) 22 - 26 mmol/L    BASE EXCESS 1.4 0 - 3 mmol/L    TOTAL HEMOGLOBIN 13.9 11.7 - 15.0 GM/DL    O2 SAT 93 92 - 98.5 %    Arterial O2 Hgb 91.7 (L) 94 - 97 %    CARBOXYHEMOGLOBIN 0.9 0.5 - 1.5 %    METHEMOGLOBIN 0.5 0.0 - 1.5 %    DEOXYHEMOGLOBIN 7 (H) 0.0 - 5.0 %    SITE LR     ALLENS TEST POSITIVE      MODE BIPAP20 10     FIO2 40.0 %    RATE 18.0      Respiratory comment: Dr. Lisa Wu at 4 15 2018 12 18 10 PM. Read back. Assessment/Plan:     Principal Problem:    Acute respiratory failure ([facility] (4/14/2018)    Active Problems:    DM (diabetes mellitus) w/o complication (6/95/9944)      Overview: OV: 12/21/15      bg has been worse      COPD (chronic obstructive pulmonary disease) ([facility] (2/27/2013)      Hypothyroidism (2/27/2013)      ASCAD - of the native vessel (2/27/2013)      Overview: OV: 12/21/15      No sob-occasional cp but 3 episodes of cp last week- 3ntg      Head lice infestation (4/24/6267)      Tobacco abuse (4/15/2018)      Nocturnal hypoxemia (4/15/2018)      Fall (4/15/2018)      Continue duonebs q4, azithromycin, and solumedrol 60 mg q12. Pulmonary appreciated. bipap setting being adjusted per pulmonary but no headway thus far in ABG improvements. Per record, patient requested DNR status and this is on the chart. Increase lantus from 60 to 80 units qhs and give nph 20 units x one now. Check UA.   TSH normal.     Care Plan discussed with: Patient/Family and Nurse    Signed By: Fallon Pollard MD     April 15, 2018

## 2018-04-15 NOTE — PROGRESS NOTES
Bedside report received from [facility]. Pt alert and oriented. Pt resting quietly in bed. Pt breath sounds diminished. S1S2. Abdomen soft, nontender, intact. Active bowel sounds. Bruise to left hip. Pt c/o of back pain. Tylenol given per day shift nurse. MD to be notified of continuing pain. Pulses palpable in all extremities. Skin warm, dry, intact. Sacrum intact. Allevyn in place. Will continue to monitor. Full assessment as charted.

## 2018-04-15 NOTE — PROGRESS NOTES
TRANSFER - IN REPORT:    Verbal report received from KATHIE Miller on Ita Haji  being received from ER for routine progression of care      Report consisted of patients Situation, Background, Assessment and   Recommendations(SBAR). Information from the following report(s) SBAR was reviewed with the receiving nurse. Opportunity for questions and clarification was provided. Assessment completed upon patients arrival to unit and care assumed.

## 2018-04-15 NOTE — H&P
Hospitalist H&P/Consult Note     Admit Date:  2018  8:44 PM   Name:  Carly Pickering   Age:  75 y.o.  :  1950   MRN:  [id number]   PCP:  Petra Fortune MD  Treatment Team: Attending Provider: Cherise Christine MD; Primary Nurse: Robbie Matias; Primary Nurse: Claudia Green RN    HPI:   75years old F with PMH of OA, IBS, CAD, DM, COPD presented to the hospital after having a fall at home. Patient stated she was trying to get up from her bed when her feet slipped and patient fell to the floor. Patient stated she hit the L side of her chest with a trash can. Patient denies chest pain, dizziness or any other symptoms before the fall. Patient stated for the last 4 days is having SOB and productive cough of yellowish sputum. Patient stated trying  inhalers and nebulizer at home with no improvement of the symptoms. Patient reported her grandson is having similar symptoms. In the ED patient was wheezing requiring 5 L NC. Patient received treatment with no improvement of symptoms. X-ray ribs did not show any fracture. 10 systems reviewed and negative except as noted in HPI. Past Medical History:   Diagnosis Date    Anxiety     managed with medication     Arthritis     ASCAD - of the native vessel 2013    Asthma     CAD (coronary artery disease)     stents x 3    Cancer (HCC)     hx uterine cancer    Chronic pain     d/t fibromyalgia    Claustrophobia     COPD     PRN inhaler; uses once every 2-3 months    Current smoker     Degenerative joint disease     Depression     managed with medication     Diabetes ([facility]     type 2; normal fasting bs (  );  Insulin dependent; checks bs 4 times per day    Diverticulosis     managed with diet     DM (diabetes mellitus) w/o complication     Dyslipidemia     Fatty liver     Fibromyalgia     GERD (gastroesophageal reflux disease)     controlled w/med    H/O echocardiogram 14    EF >69.7%    HTN (hypertension) - controlled, benign 2013    Hyperlipemia     managed with medication     Hypertension     controlled w/med    Hypothyroidism     managed with medication     IBS (irritable bowel syndrome)     w/diverticulitis    Lipid - hyperlipidemia other unsp dyslipidemia 2016    Macular degeneration     Mass of kidney     Murmur     monitored by Cardiologist, Sami Andre; last echo 14    Myalgia and myositis, unspecified     no present treatment     Obesity     BMI 39.1    Osteoarthrosis, unspecified whether generalized or localized, unspecified site     no present treatment     Psychiatric disorder     anxiety/depression    PUD (peptic ulcer disease) 1980    Seizures ([address] 2011    s/p stopping xanax abruptly    Sleep apnea     noncompliant with CPAP    Syncope and collapse 2016    Tobacco use disorder 2016    Urinary, incontinence, stress female     w/ cystocele-rectocele repair    Vertigo     managed with prn medication       Past Surgical History:   Procedure Laterality Date    CARDIAC SURG PROCEDURE UNLIST      3 caths total; stents x3, last stent 2014    COLONOSCOPY      HX ANKLE FRACTURE TX Right     repair with hardware    HX APPENDECTOMY      HX BREAST LUMPECTOMY Bilateral     HX CARPAL TUNNEL RELEASE Right     HX HYSTERECTOMY  1979    HX KNEE ARTHROSCOPY Right     HX KNEE REPLACEMENT Left     HX LAP CHOLECYSTECTOMY      HX TUBAL LIGATION      HX UROLOGICAL      sling      Prior to Admission Medications   Prescriptions Last Dose Informant Patient Reported? Taking? ALPRAZolam (XANAX) 2 mg tablet   Yes No   Sig: Take 2 mg by mouth four (4) times daily. Take / use AM day of surgery  per anesthesia protocols. Indications: ANXIETY WITH DEPRESSION   Insulin Lisp & Lisp Prot, Hum, (HUMALOG 75-25 MIX) 100 unit/mL (75-25) inpn   Yes No   Si Units by SubCUTAneous route two (2) times a day. Take / use half dose AM day of surgery  per anesthesia protocols. Indications: TYPE 2 DIABETES MELLITUS   albuterol (PROAIR HFA) 90 mcg/actuation inhaler   Yes No   Sig: Take 2 Puffs by inhalation every four (4) hours as needed. Take / use AM day of surgery  per anesthesia protocols if needed   aspirin 81 mg tablet   Yes No   Sig: Take 81 mg by mouth daily. Take / use AM day of surgery  per anesthesia protocols. clopidogrel (PLAVIX) 75 mg tab   No No   Sig: Take 1 Tab by mouth daily. escitalopram (LEXAPRO) 20 mg tablet   Yes No   Sig: Take 20 mg by mouth daily. Take / use AM day of surgery  per anesthesia protocols. Indications: ANXIETY WITH DEPRESSION   fenofibrate micronized (LOFIBRA) 200 mg capsule   No No   Sig: TAKE ONE CAPSULE BY MOUTH EACH NIGHT AT BEDTIME FOR TRIGLYCERIDES AND CHOLESTEROL. insulin glargine (LANTUS) 100 unit/mL injection   Yes No   Si Units by SubCUTAneous route nightly. Indications: TYPE 2 DIABETES MELLITUS   levothyroxine (SYNTHROID) 100 mcg tablet   Yes No   Sig: Take 100 mcg by mouth Daily (before breakfast). Per anesthesia protocol:instructed to take am of surgery. Indications: HYPOTHYROIDISM   loperamide (IMMODIUM) 2 mg tablet   Yes No   Sig: Take 2 mg by mouth four (4) times daily as needed for Diarrhea.   losartan (COZAAR) 25 mg tablet   No No   Sig: TAKE ONE TABLET BY MOUTH EVERY DAY FOR BLOOD PRESSURE   meclizine (ANTIVERT) 25 mg tablet   Yes No   Sig: Take 25 mg by mouth three (3) times daily as needed. Indications: VERTIGO   metoprolol tartrate (LOPRESSOR) 25 mg tablet   No No   Sig: Take 1 Tab by mouth two (2) times a day. niacin ER (NIASPAN) 500 mg tablet   No No   Sig: TAKE ONE TABLET BY MOUTH ONCE DAILY FOR TRIGLYCERIDES AND CHOLESTEROL. nitroglycerin (NITROSTAT) 0.4 mg SL tablet   Yes No   Si.4 mg by SubLINGual route every five (5) minutes as needed. Take / use AM day of surgery  per anesthesia protocols if needed   pantoprazole (PROTONIX) 40 mg tablet   Yes No   Sig: Take 40 mg by mouth two (2) times a day.  Take / use AM day of surgery  per anesthesia protocols. Indications: GASTROESOPHAGEAL REFLUX   rosuvastatin (CRESTOR) 10 mg tablet   Yes No   Sig: Take 10 mg by mouth nightly. Indications: HYPERCHOLESTEROLEMIA      Facility-Administered Medications: None     Allergies   Allergen Reactions    Lisinopril Swelling     Throat swelling      Oxycodone Rash      Social History   Substance Use Topics    Smoking status: Current Every Day Smoker     Packs/day: 0.25     Years: 53.00    Smokeless tobacco: Never Used    Alcohol use No      Family History   Problem Relation Age of Onset    Heart Attack Mother      MI age 70    Diabetes Mother     Heart Disease Mother     Heart Disease Sister     Heart Failure Sister 61     cabg    Diabetes Sister     Heart Disease Brother     Heart Attack Brother 28     mi    Cancer Brother     Heart Failure Sister 61     cabg    Heart Disease Sister     Cancer Father     Alzheimer Father     Heart Attack Brother       Immunization History   Administered Date(s) Administered    TB Skin Test (PPD) Intradermal 02/27/2013       Objective:     Patient Vitals for the past 24 hrs:   Temp Pulse Resp BP SpO2   04/14/18 2314 - 71 - 129/62 91 %   04/14/18 2238 - - - - 95 %   04/14/18 2149 - - - - 95 %   04/14/18 2147 - 71 - - 95 %   04/14/18 2145 - - - 148/64 -   04/14/18 2108 - - - - 96 %   04/14/18 2051 - 74 - (!) 166/100 94 %   04/14/18 2050 - - - - 94 %   04/14/18 2049 98.1 °F (36.7 °C) 69 22 (!) 166/100 (!) 72 %     Oxygen Therapy  O2 Sat (%): 91 % (04/14/18 2314)  Pulse via Oximetry: 71 beats per minute (04/14/18 2314)  O2 Device: Nasal cannula (04/14/18 2238)  O2 Flow Rate (L/min): 5 l/min (04/14/18 2238)  No intake or output data in the 24 hours ending 04/14/18 2346    Physical Exam:  General:    Well nourished. Alert. Eyes:   Normal sclera. Extraocular movements intact. ENT:  Normocephalic, atraumatic. Moist mucous membranes  CV:   RRR. No murmur, rub, or gallop.   L lower ribs tenderness  Lungs:  B/l expiratory wheezing and ronchi. No crackles. Abdomen: Soft, nontender, nondistended. Bowel sounds normal.   Extremities: Tenderness L hip. No pedal edema. warm  Neurologic: CN II-XII grossly intact. Sensation intact. Moving extremities. Skin:     No rashes or jaundice. No wounds. Psych:  Normal mood and affect. I reviewed the labs, imaging, and other studies done this admission. Data Review:   Recent Results (from the past 24 hour(s))   CBC WITH AUTOMATED DIFF    Collection Time: 04/14/18  9:24 PM   Result Value Ref Range    WBC 8.2 4.3 - 11.1 K/uL    RBC 4.83 4.05 - 5.25 M/uL    HGB 14.4 11.7 - 15.4 g/dL    HCT 43.8 35.8 - 46.3 %    MCV 90.7 79.6 - 97.8 FL    MCH 29.8 26.1 - 32.9 PG    MCHC 32.9 31.4 - 35.0 g/dL    RDW 12.6 11.9 - 14.6 %    PLATELET 163 168 - 866 K/uL    MPV 11.2 10.8 - 14.1 FL    DF AUTOMATED      NEUTROPHILS 58 43 - 78 %    LYMPHOCYTES 29 13 - 44 %    MONOCYTES 7 4.0 - 12.0 %    EOSINOPHILS 4 0.5 - 7.8 %    BASOPHILS 1 0.0 - 2.0 %    IMMATURE GRANULOCYTES 1 0.0 - 5.0 %    ABS. NEUTROPHILS 4.8 1.7 - 8.2 K/UL    ABS. LYMPHOCYTES 2.4 0.5 - 4.6 K/UL    ABS. MONOCYTES 0.6 0.1 - 1.3 K/UL    ABS. EOSINOPHILS 0.3 0.0 - 0.8 K/UL    ABS. BASOPHILS 0.1 0.0 - 0.2 K/UL    ABS. IMM. GRANS. 0.1 0.0 - 0.5 K/UL   METABOLIC PANEL, COMPREHENSIVE    Collection Time: 04/14/18  9:24 PM   Result Value Ref Range    Sodium 140 136 - 145 mmol/L    Potassium 4.4 3.5 - 5.1 mmol/L    Chloride 101 98 - 107 mmol/L    CO2 32 21 - 32 mmol/L    Anion gap 7 7 - 16 mmol/L    Glucose 186 (H) 65 - 100 mg/dL    BUN 11 8 - 23 MG/DL    Creatinine 0.79 0.6 - 1.0 MG/DL    GFR est AA >60 >60 ml/min/1.73m2    GFR est non-AA >60 >60 ml/min/1.73m2    Calcium 9.2 8.3 - 10.4 MG/DL    Bilirubin, total 0.5 0.2 - 1.1 MG/DL    ALT (SGPT) 19 12 - 65 U/L    AST (SGOT) 30 15 - 37 U/L    Alk.  phosphatase 93 50 - 136 U/L    Protein, total 7.7 6.3 - 8.2 g/dL    Albumin 3.2 3.2 - 4.6 g/dL    Globulin 4.5 (H) 2.3 - 3.5 g/dL    A-G Ratio 0.7 (L) 1.2 - 3.5     PROTHROMBIN TIME + INR    Collection Time: 04/14/18  9:24 PM   Result Value Ref Range    Prothrombin time 13.1 11.5 - 14.5 sec    INR 1.0         Imaging Studies:  CXR Results  (Last 48 hours)    None        CT Results  (Last 48 hours)    None          Assessment and Plan:     Hospital Problems as of 4/14/2018  Date Reviewed: 3/23/2017          Codes Class Noted - Resolved POA    * (Principal)Acute respiratory failure ([facility] ICD-10-CM: J96.00  ICD-9-CM: 518.81  4/14/2018 - Present Unknown        DM (diabetes mellitus) w/o complication DND-51-OB: Q06.2  ICD-9-CM: 250.00  2/27/2013 - Present Yes    Overview Signed 6/9/2016  1:44 PM by Lacy Jarquin     OV: 12/21/15  bg has been worse             COPD (chronic obstructive pulmonary disease) ([facility] ICD-10-CM: J44.9  ICD-9-CM: 496  2/27/2013 - Present Yes        Hypothyroidism ICD-10-CM: E03.9  ICD-9-CM: 244.9  2/27/2013 - Present Yes        ASCAD - of the native vessel ICD-10-CM: I25.110  ICD-9-CM: 414.01, 411.1  2/27/2013 - Present Yes    Overview Signed 6/9/2016  1:55 PM by Lacy Jarquin     OV: 12/21/15  No sob-occasional cp but 3 episodes of cp last week- 3ntg                   A/P:    -Acute hypoxic respiratory failure  -COPD exacerbation  Patient having wheezing and requiring 5L O2  Afebrile, no WBC elevation at ED    Plan  Get CXR as patient is having productive cough  Start duo nebs, IV steroids and Zithromax  Check Procalcitonin  Wean O2 as tolerated    -Mechanical Fall at home   Get x-ray L hip as patient is having tenderness  Cont supportive care and PT eval    -CAD  No active symptoms  Restart home meds. -DM  Will await for med reconciliation as is showing patient is on ? Lantus and 75/25  Start ISS for now    Restart rest of home meds. DVT: ppx :heparin  Code: DNR. Discussed with patient at bedside. Estimated LOS:  2-3 nights    Signed:   Reyes Parra MD

## 2018-04-15 NOTE — PROGRESS NOTES
Granddaughter states they live in a trailer with 7 family members; needs financial assistance to treat home/ all family members for lice. In need of meal vouchers. SW consulted.

## 2018-04-15 NOTE — ED NOTES
Full report to Tyler. She will assume care of this patient along with KATHIE Ramon. At current, patient restful in bed awaiting admission. Patient O2 sat > 90% on NC though with persistent wheeze.

## 2018-04-15 NOTE — PROGRESS NOTES
Called pharmacy again about Lice Shampoo, not available in pharmacy at this time. They have ordered it and it will arrive tomorrow.

## 2018-04-16 PROBLEM — J96.10 CHRONIC RESPIRATORY FAILURE (HCC): Chronic | Status: ACTIVE | Noted: 2018-04-16

## 2018-04-16 PROBLEM — R00.1 BRADYCARDIA: Status: ACTIVE | Noted: 2018-04-16

## 2018-04-16 PROBLEM — J96.01 ACUTE RESPIRATORY FAILURE WITH HYPOXIA AND HYPERCAPNIA (HCC): Status: ACTIVE | Noted: 2018-04-14

## 2018-04-16 PROBLEM — J44.9 COPD (CHRONIC OBSTRUCTIVE PULMONARY DISEASE) (HCC): Chronic | Status: ACTIVE | Noted: 2018-04-15

## 2018-04-16 PROBLEM — J96.02 ACUTE RESPIRATORY FAILURE WITH HYPOXIA AND HYPERCAPNIA (HCC): Status: ACTIVE | Noted: 2018-04-14

## 2018-04-16 PROBLEM — J96.21 ACUTE ON CHRONIC RESPIRATORY FAILURE WITH HYPOXIA AND HYPERCAPNIA (HCC): Status: ACTIVE | Noted: 2018-04-14

## 2018-04-16 PROBLEM — I25.110 CORONARY ARTERY DISEASE INVOLVING NATIVE CORONARY ARTERY WITH UNSTABLE ANGINA PECTORIS (HCC): Chronic | Status: ACTIVE | Noted: 2018-04-15

## 2018-04-16 PROBLEM — E03.9 HYPOTHYROIDISM: Chronic | Status: ACTIVE | Noted: 2018-04-15

## 2018-04-16 PROBLEM — J44.1 COPD EXACERBATION (HCC): Status: ACTIVE | Noted: 2018-04-16

## 2018-04-16 PROBLEM — E11.9 DM (DIABETES MELLITUS) (HCC): Chronic | Status: ACTIVE | Noted: 2018-04-15

## 2018-04-16 PROBLEM — J96.22 ACUTE ON CHRONIC RESPIRATORY FAILURE WITH HYPOXIA AND HYPERCAPNIA (HCC): Status: ACTIVE | Noted: 2018-04-14

## 2018-04-16 LAB
ANION GAP SERPL CALC-SCNC: 7 MMOL/L (ref 7–16)
ARTERIAL PATENCY WRIST A: POSITIVE
BASE EXCESS BLDA CALC-SCNC: 5 MMOL/L (ref 0–3)
BASOPHILS # BLD: 0 K/UL (ref 0–0.2)
BASOPHILS NFR BLD: 0 % (ref 0–2)
BDY SITE: ABNORMAL
BUN SERPL-MCNC: 20 MG/DL (ref 8–23)
CA-I BLD-SCNC: 1.15 MMOL/L (ref 1–1.3)
CALCIUM SERPL-MCNC: 8.2 MG/DL (ref 8.3–10.4)
CHLORIDE BLDA-SCNC: 93 MMOL/L (ref 98–106)
CHLORIDE SERPL-SCNC: 97 MMOL/L (ref 98–107)
CO2 SERPL-SCNC: 32 MMOL/L (ref 21–32)
COHGB MFR BLD: 1.2 % (ref 0.5–1.5)
CREAT SERPL-MCNC: 1.11 MG/DL (ref 0.6–1)
DIFFERENTIAL METHOD BLD: ABNORMAL
DO-HGB BLD-MCNC: 3 % (ref 0–5)
EOSINOPHIL # BLD: 0 K/UL (ref 0–0.8)
EOSINOPHIL NFR BLD: 0 % (ref 0.5–7.8)
ERYTHROCYTE [DISTWIDTH] IN BLOOD BY AUTOMATED COUNT: 12.5 % (ref 11.9–14.6)
FIO2 ON VENT: 35 %
GLUCOSE BLD STRIP.AUTO-MCNC: 247 MG/DL (ref 65–100)
GLUCOSE BLD STRIP.AUTO-MCNC: 256 MG/DL (ref 65–100)
GLUCOSE BLD STRIP.AUTO-MCNC: 264 MG/DL (ref 65–100)
GLUCOSE BLD STRIP.AUTO-MCNC: 294 MG/DL (ref 65–100)
GLUCOSE SERPL-MCNC: 277 MG/DL (ref 65–100)
HCO3 BLDA-SCNC: 34 MMOL/L (ref 22–26)
HCT VFR BLD AUTO: 38.9 % (ref 35.8–46.3)
HGB BLD-MCNC: 12.4 G/DL (ref 11.7–15.4)
HGB BLDMV-MCNC: 12.8 GM/DL (ref 11.7–15)
IMM GRANULOCYTES # BLD: 0.1 K/UL (ref 0–0.5)
IMM GRANULOCYTES NFR BLD AUTO: 1 % (ref 0–5)
LYMPHOCYTES # BLD: 1 K/UL (ref 0.5–4.6)
LYMPHOCYTES NFR BLD: 8 % (ref 13–44)
MAGNESIUM SERPL-MCNC: 2 MG/DL (ref 1.8–2.4)
MCH RBC QN AUTO: 29.6 PG (ref 26.1–32.9)
MCHC RBC AUTO-ENTMCNC: 31.9 G/DL (ref 31.4–35)
MCV RBC AUTO: 92.8 FL (ref 79.6–97.8)
METHGB MFR BLD: 0.3 % (ref 0–1.5)
MONOCYTES # BLD: 0.5 K/UL (ref 0.1–1.3)
MONOCYTES NFR BLD: 4 % (ref 4–12)
NEUTS SEG # BLD: 11.3 K/UL (ref 1.7–8.2)
NEUTS SEG NFR BLD: 87 % (ref 43–78)
OXYHGB MFR BLDA: 95.5 % (ref 94–97)
PCO2 BLDA: 69 MMHG (ref 35–45)
PH BLDA: 7.3 [PH] (ref 7.35–7.45)
PHOSPHATE SERPL-MCNC: 3.5 MG/DL (ref 2.3–3.7)
PLATELET # BLD AUTO: 235 K/UL (ref 150–450)
PMV BLD AUTO: 11.3 FL (ref 10.8–14.1)
PO2 BLDA: 88 MMHG (ref 80–105)
POTASSIUM BLDA-SCNC: 5.32 MMOL/L (ref 3.5–5.3)
POTASSIUM SERPL-SCNC: 5 MMOL/L (ref 3.5–5.1)
RBC # BLD AUTO: 4.19 M/UL (ref 4.05–5.25)
RESP RATE: 20
RPR SER QL: NONREACTIVE
SAO2 % BLD: 97 % (ref 92–98.5)
SERVICE CMNT-IMP: ABNORMAL
SODIUM BLDA-SCNC: 133.3 MMOL/L (ref 135–148)
SODIUM SERPL-SCNC: 136 MMOL/L (ref 136–145)
VENTILATION MODE VENT: ABNORMAL
WBC # BLD AUTO: 12.9 K/UL (ref 4.3–11.1)

## 2018-04-16 PROCEDURE — 74011250636 HC RX REV CODE- 250/636: Performed by: INTERNAL MEDICINE

## 2018-04-16 PROCEDURE — 94660 CPAP INITIATION&MGMT: CPT

## 2018-04-16 PROCEDURE — 82803 BLOOD GASES ANY COMBINATION: CPT

## 2018-04-16 PROCEDURE — 74011636637 HC RX REV CODE- 636/637: Performed by: INTERNAL MEDICINE

## 2018-04-16 PROCEDURE — 85025 COMPLETE CBC W/AUTO DIFF WBC: CPT | Performed by: INTERNAL MEDICINE

## 2018-04-16 PROCEDURE — 74011250637 HC RX REV CODE- 250/637: Performed by: INTERNAL MEDICINE

## 2018-04-16 PROCEDURE — 36415 COLL VENOUS BLD VENIPUNCTURE: CPT | Performed by: INTERNAL MEDICINE

## 2018-04-16 PROCEDURE — 65610000001 HC ROOM ICU GENERAL

## 2018-04-16 PROCEDURE — 80048 BASIC METABOLIC PNL TOTAL CA: CPT | Performed by: INTERNAL MEDICINE

## 2018-04-16 PROCEDURE — 84100 ASSAY OF PHOSPHORUS: CPT | Performed by: INTERNAL MEDICINE

## 2018-04-16 PROCEDURE — 36600 WITHDRAWAL OF ARTERIAL BLOOD: CPT

## 2018-04-16 PROCEDURE — 74011000250 HC RX REV CODE- 250: Performed by: INTERNAL MEDICINE

## 2018-04-16 PROCEDURE — 83735 ASSAY OF MAGNESIUM: CPT | Performed by: INTERNAL MEDICINE

## 2018-04-16 PROCEDURE — 82962 GLUCOSE BLOOD TEST: CPT

## 2018-04-16 PROCEDURE — 94640 AIRWAY INHALATION TREATMENT: CPT

## 2018-04-16 PROCEDURE — 99232 SBSQ HOSP IP/OBS MODERATE 35: CPT | Performed by: INTERNAL MEDICINE

## 2018-04-16 RX ORDER — IPRATROPIUM BROMIDE AND ALBUTEROL SULFATE 2.5; .5 MG/3ML; MG/3ML
3 SOLUTION RESPIRATORY (INHALATION)
Status: DISCONTINUED | OUTPATIENT
Start: 2018-04-16 | End: 2018-04-19 | Stop reason: HOSPADM

## 2018-04-16 RX ORDER — AZITHROMYCIN 250 MG/1
500 TABLET, FILM COATED ORAL EVERY 24 HOURS
Status: DISCONTINUED | OUTPATIENT
Start: 2018-04-16 | End: 2018-04-19 | Stop reason: HOSPADM

## 2018-04-16 RX ORDER — BUDESONIDE 0.5 MG/2ML
500 INHALANT ORAL
Status: DISCONTINUED | OUTPATIENT
Start: 2018-04-16 | End: 2018-04-19 | Stop reason: HOSPADM

## 2018-04-16 RX ADMIN — INSULIN GLARGINE 80 UNITS: 100 INJECTION, SOLUTION SUBCUTANEOUS at 21:41

## 2018-04-16 RX ADMIN — INSULIN LISPRO 4 UNITS: 100 INJECTION, SOLUTION INTRAVENOUS; SUBCUTANEOUS at 16:29

## 2018-04-16 RX ADMIN — HEPARIN SODIUM 5000 UNITS: 5000 INJECTION, SOLUTION INTRAVENOUS; SUBCUTANEOUS at 13:16

## 2018-04-16 RX ADMIN — NYSTATIN: 100000 POWDER TOPICAL at 21:01

## 2018-04-16 RX ADMIN — HEPARIN SODIUM 5000 UNITS: 5000 INJECTION, SOLUTION INTRAVENOUS; SUBCUTANEOUS at 21:36

## 2018-04-16 RX ADMIN — LOSARTAN POTASSIUM 25 MG: 50 TABLET ORAL at 08:34

## 2018-04-16 RX ADMIN — PANTOPRAZOLE SODIUM 40 MG: 40 TABLET, DELAYED RELEASE ORAL at 16:29

## 2018-04-16 RX ADMIN — ALPRAZOLAM 1 MG: 0.5 TABLET ORAL at 21:36

## 2018-04-16 RX ADMIN — ALPRAZOLAM 1 MG: 0.5 TABLET ORAL at 13:16

## 2018-04-16 RX ADMIN — INSULIN LISPRO 6 UNITS: 100 INJECTION, SOLUTION INTRAVENOUS; SUBCUTANEOUS at 08:33

## 2018-04-16 RX ADMIN — INSULIN LISPRO 6 UNITS: 100 INJECTION, SOLUTION INTRAVENOUS; SUBCUTANEOUS at 21:40

## 2018-04-16 RX ADMIN — IPRATROPIUM BROMIDE AND ALBUTEROL SULFATE 3 ML: .5; 3 SOLUTION RESPIRATORY (INHALATION) at 07:20

## 2018-04-16 RX ADMIN — METHYLPREDNISOLONE SODIUM SUCCINATE 60 MG: 125 INJECTION, POWDER, FOR SOLUTION INTRAMUSCULAR; INTRAVENOUS at 13:06

## 2018-04-16 RX ADMIN — IPRATROPIUM BROMIDE AND ALBUTEROL SULFATE 3 ML: .5; 3 SOLUTION RESPIRATORY (INHALATION) at 13:18

## 2018-04-16 RX ADMIN — BUDESONIDE 500 MCG: 0.5 INHALANT RESPIRATORY (INHALATION) at 19:27

## 2018-04-16 RX ADMIN — METHYLPREDNISOLONE SODIUM SUCCINATE 60 MG: 125 INJECTION, POWDER, FOR SOLUTION INTRAMUSCULAR; INTRAVENOUS at 08:34

## 2018-04-16 RX ADMIN — Medication 10 ML: at 21:01

## 2018-04-16 RX ADMIN — ROSUVASTATIN CALCIUM 10 MG: 5 TABLET, FILM COATED ORAL at 21:36

## 2018-04-16 RX ADMIN — ASPIRIN 81 MG: 81 TABLET, COATED ORAL at 08:33

## 2018-04-16 RX ADMIN — HEPARIN SODIUM 5000 UNITS: 5000 INJECTION, SOLUTION INTRAVENOUS; SUBCUTANEOUS at 05:50

## 2018-04-16 RX ADMIN — METHYLPREDNISOLONE SODIUM SUCCINATE 60 MG: 125 INJECTION, POWDER, FOR SOLUTION INTRAMUSCULAR; INTRAVENOUS at 18:06

## 2018-04-16 RX ADMIN — AZITHROMYCIN 500 MG: 250 TABLET, FILM COATED ORAL at 21:01

## 2018-04-16 RX ADMIN — METHYLPREDNISOLONE SODIUM SUCCINATE 60 MG: 125 INJECTION, POWDER, FOR SOLUTION INTRAMUSCULAR; INTRAVENOUS at 23:52

## 2018-04-16 RX ADMIN — IPRATROPIUM BROMIDE AND ALBUTEROL SULFATE 3 ML: .5; 3 SOLUTION RESPIRATORY (INHALATION) at 19:27

## 2018-04-16 RX ADMIN — PANTOPRAZOLE SODIUM 40 MG: 40 TABLET, DELAYED RELEASE ORAL at 08:38

## 2018-04-16 RX ADMIN — NYSTATIN: 100000 POWDER TOPICAL at 08:38

## 2018-04-16 RX ADMIN — Medication 10 ML: at 13:07

## 2018-04-16 RX ADMIN — AZITHROMYCIN MONOHYDRATE 500 MG: 500 INJECTION, POWDER, LYOPHILIZED, FOR SOLUTION INTRAVENOUS at 02:49

## 2018-04-16 RX ADMIN — IPRATROPIUM BROMIDE AND ALBUTEROL SULFATE 3 ML: .5; 3 SOLUTION RESPIRATORY (INHALATION) at 03:17

## 2018-04-16 RX ADMIN — NIACIN 500 MG: 500 TABLET, EXTENDED RELEASE ORAL at 21:36

## 2018-04-16 RX ADMIN — Medication 10 ML: at 05:51

## 2018-04-16 RX ADMIN — INSULIN LISPRO 6 UNITS: 100 INJECTION, SOLUTION INTRAVENOUS; SUBCUTANEOUS at 13:05

## 2018-04-16 RX ADMIN — LEVOTHYROXINE SODIUM 100 MCG: 100 TABLET ORAL at 08:33

## 2018-04-16 RX ADMIN — CLOPIDOGREL BISULFATE 75 MG: 75 TABLET ORAL at 08:34

## 2018-04-16 NOTE — PROGRESS NOTES
PROGRESS NOTE     I was paged to the room because the family & patient wanted to discuss code status and the patient wanted her home antianxiety and pain medications restarted. I arrived in the room where 2 of the patient's 4 sons were present, along with her ex-. The patient was A&Ox4 and had good insight into why she was in the hospital.    I explained what a Full Code entailed vs. a DNR status, which is what she said she wanted in the ER and today with the day time doctors. Upon further discussion, the sons and the patient all agreed she wanted to be a FULL CODE and she would want CPR/ACLS and intubation if needed. She also complained that we were not giving her her home dose of Xanax and Norco. I explained that this may have contributed to her respiratory failure and that I was ok restarting them, but at lower doses, as long as she agreed to wear her CPAP overnight. She is now a FULL CODE.  I added back Xanax 1mg and Virgil House, DO

## 2018-04-16 NOTE — PROGRESS NOTES
Critical Care Daily Progress Note: 4/16/2018    Haley Pickering   Admission Date: 4/14/2018         The patient's chart is reviewed and the patient is discussed with the staff. 75 y.o. CF evaluated at the request of Dr. Esperanza Moulton. Patient has a history of recurrent/persistent lice, DM, hypothyroidism, GERD, IBS, HTN, HL, CAD, JEREMÍAS on O2 at 3 lpm with sleep (does not wear CPAP), COPD with rescue inhaler only, and tobacco abuse (~50 pack year history). She sustained a fall at home 2 days prior to presentation, was unable to walk and finally allowed her family to call EMS. They report she has had increased sob and was recently prescribed a Donelda Oyster which had not been started. In ER was hypoxic requiring 5L O2 to maintain sats in the 90s. Had ongoing wheezing despite treatment and was admitted for continued care. No rib or hip fx on imaging. Patient developed increased confusion and worsening hypoxia and RR was called. Family also states that patient c/o HA. Was placed on BIPAP and transferred to ICU for continued care. Placed on isolation for head lice. Continued supplemental O2 and was able to wean off BIPAP. Subjective:     Lying in bed, oriented to surroundings. States has as CPAP machine but thinks it is in storage. Denies shortness of breath or cough. Wore BIPAP with sleep last night.     Current Facility-Administered Medications   Medication Dose Route Frequency    sodium chloride (NS) flush 5-10 mL  5-10 mL IntraVENous Q8H    sodium chloride (NS) flush 5-10 mL  5-10 mL IntraVENous PRN    methylPREDNISolone (PF) (SOLU-MEDROL) injection 60 mg  60 mg IntraVENous Q12H    acetaminophen (TYLENOL) tablet 650 mg  650 mg Oral Q4H PRN    heparin (porcine) injection 5,000 Units  5,000 Units SubCUTAneous Q8H    albuterol-ipratropium (DUO-NEB) 2.5 MG-0.5 MG/3 ML  3 mL Nebulization Q4H RT    aspirin delayed-release tablet 81 mg  81 mg Oral DAILY    clopidogrel (PLAVIX) tablet 75 mg 75 mg Oral DAILY    levothyroxine (SYNTHROID) tablet 100 mcg  100 mcg Oral ACB    loperamide (IMODIUM) capsule 2 mg  2 mg Oral Q4H PRN    losartan (COZAAR) tablet 25 mg  25 mg Oral DAILY    metoprolol tartrate (LOPRESSOR) tablet 25 mg  25 mg Oral BID    niacin ER (NIASPAN) tablet 500 mg  500 mg Oral QHS    nitroglycerin (NITROSTAT) tablet 0.4 mg  0.4 mg SubLINGual PRN    pantoprazole (PROTONIX) tablet 40 mg  40 mg Oral ACB&D    rosuvastatin (CRESTOR) tablet 10 mg  10 mg Oral QHS    insulin lispro (HUMALOG) injection   SubCUTAneous AC&HS    azithromycin (ZITHROMAX) 500 mg in 0.9% sodium chloride (MBP/ADV) 250 mL  500 mg IntraVENous Q24H    nystatin (MYCOSTATIN) 100,000 unit/gram powder   Topical BID    insulin glargine (LANTUS) injection 80 Units  80 Units SubCUTAneous QHS    ALPRAZolam (XANAX) tablet 1 mg  1 mg Oral TID PRN    HYDROcodone-acetaminophen (NORCO)  mg tablet 1 Tab  1 Tab Oral Q6H PRN       Review of Systems  Constitutional:  negative for fever, chills, sweats  Cardiovascular:  negative for chest pain, palpitations, syncope, edema  Gastrointestinal:  negative for dysphagia, reflux, vomiting, diarrhea, abdominal pain, or melena  Neurologic:  negative for focal weakness, numbness, headache      Objective:     Vitals:    04/16/18 0401 04/16/18 0432 04/16/18 0502 04/16/18 0516   BP: 110/57 114/62 112/61    Pulse: (!) 55 (!) 53 (!) 52    Resp: 21 23 20    Temp:       SpO2: 95% 96% 96%    Weight:    249 lb 9 oz (113.2 kg)   Height:           Intake and Output:   04/14 1901 - 04/16 0700  In: 1005 [P.O.:755;  I.V.:250]  Out: 1225 [Urine:1225]       Physical Exam:          Constitutional:  the patient is obese and in no acute distress, BIPAP 35%, sat 96%  EENMT:  Sclera clear, pupils equal, oral mucosa moist, head lice  Respiratory: few crackles, rare wheeze  Cardiovascular:  Regular, sinus bradycardia HR 40s-50s without M,G,R  Gastrointestinal: soft, obese and non-tender; with positive bowel sounds. Musculoskeletal: warm without cyanosis. There is no lower leg edema. Skin:  no jaundice or rashes, no open wounds   Neurologic: no gross neuro deficits     Psychiatric:  alert and oriented x 3    LINES:    PIV sites  [place] 4/15/18    DRIPS:   none    CXR: None today    CT Head 4/15/18:  No acute intracranial findings    CXR 4/15/18:  No acute abnormality      LAB  Recent Labs      04/15/18   2118  04/15/18   1634  04/15/18   1128  04/15/18   0739  04/15/18   0611   GLUCPOC  349*  246*  363*  427*  460*      Recent Labs      04/16/18   0338  04/15/18   0540  04/14/18   2124   WBC  12.9*  8.7  8.2   HGB  12.4  12.9  14.4   HCT  38.9  40.1  43.8   PLT  235  222  205   INR   --    --   1.0     Recent Labs      04/16/18   0338  04/15/18   0540  04/14/18   2124   NA  136  135*  140   K  5.0  4.5  4.4   CL  97*  96*  101   CO2  32  32  32   GLU  277*  505*  186*   BUN  20  14  11   CREA  1.11*  1.03*  0.79   MG  2.0  2.0   --    PHOS  3.5   --    --    CA  8.2*  8.6  9.2   ALB   --    --   3.2   TBILI   --    --   0.5   ALT   --    --   19   SGOT   --    --   30     Recent Labs      04/15/18   1210  04/15/18   1050  04/15/18   0620   PH  7.24*  7.21*  7.22*   PCO2  75*  80*  69*   PO2  73*  79*  108*   HCO3  31*  31*  28*     No results for input(s): LCAD, LAC in the last 72 hours.     Assessment:  (Medical Decision Making)     Hospital Problems  Date Reviewed: 4/16/2018          Codes Class Noted POA    DM (diabetes mellitus) w/o complication (Chronic) JTD-23-MS: E11.9  ICD-9-CM: 250.00  4/15/2018 Yes    Chronic--ranges uncontrolled 246-460--per primary    COPD (chronic obstructive pulmonary disease) (HCC) (Chronic) ICD-10-CM: J44.9  ICD-9-CM: 496  4/15/2018 Yes    chronic    Hypothyroidism (Chronic) ICD-10-CM: E03.9  ICD-9-CM: 244.9  4/15/2018 Yes    Chronic--on supplement    ASCAD - of the native vessel (Chronic) ICD-10-CM: I25.110  ICD-9-CM: 414.01, 411.1  4/15/2018 Yes     OV: 12/21/15  No sob-occasional cp but 3 episodes of cp last week- 3ntg         Chronic--no angina    Head lice infestation (Chronic) ICD-10-CM: B85.0  ICD-9-CM: 132.0  4/15/2018 Yes    treating    Tobacco abuse (Chronic) ICD-10-CM: Z72.0  ICD-9-CM: 305.1  4/15/2018 Yes    chronic    Nocturnal hypoxemia (Chronic) ICD-10-CM: W12.91  ICD-9-CM: 327.24  4/15/2018 Yes    Uses NC 3L  Has been prescribed CPAP but does not wear    Fall ICD-10-CM: Laura Esa. Hi El  ICD-9-CM: E888.9  4/15/2018 Yes    Prior to admission--no fractures    * (Principal)Acute respiratory failure [facility]) ICD-10-CM: J96.00  ICD-9-CM: 518.81  4/14/2018 Yes    weaning          Plan:  (Medical Decision Making)     --Duoneb  --Zithromax day 2  --Solu Medrol 60mg [id number]--[id number]  --WBC up to 12.9  --Check ABG now  --Possible move to the floor later today  --Have asked patient to have family bring her machine for use her and to check equipment. More than 50% of the time documented was spent in face-to-face contact with the patient and in the care of the patient on the floor/unit where the patient is located. Marvin Shah NP      Lungs:  Diffuse B exp wheeze  Heart:  Antoinette Herrera in 50's. Regular rhythm. with no Murmur/Rubs/Gallops    Additional Comments:  Patient with ongoing hypercarbia from COPD exacerbation. Continue bipap support through today. Repeat ABG in the AM. Increase IV steroids to q6. Add pulmicort to duoneb. Only on azithro, continue for now and broaden abx if fails to improve or additional infectious symptoms arise. Bradycardic this AM. D/c metoprolol. I have spoken with and examined the patient. I agree with the above assessment and plan as documented.     Neena Kay MD

## 2018-04-16 NOTE — PROGRESS NOTES
Response to request for [facility] consult. Pt sleeping/nonresponsive; no family present.  will return later today.

## 2018-04-16 NOTE — PROGRESS NOTES
When bathing pt, pt found to have cigarettes and lighter tucked under pillow. Clinical coordinator notified. Lighter and cigarettes placed in sealed clear bag and placed in drawers in pt room. Pt kept wallet with money at bedside under pillow.

## 2018-04-16 NOTE — PROGRESS NOTES
Respiratory Care Services     Policy Number: -[id number]    Title: Aerosolized Medication Protocol    Effective Date: 10/1998    Revised Date: 06/2013, 03/2016    Reviewed Date: 05/2014/ 03/2015 , 06/2017       I. Policy: The Aerosolized Medication Protocol shall by implemented by Respiratory Care              Practitioners (RCP) for patients with orders to receive aerosol therapy with medication. II. Purpose: To open and maintain obstructed airways, the RCP, will utilize the following   protocol to select the indicated aerosolized medication(s) and determine the most effective method of delivery to the patient. III. Patient Type: All patients who are determined to meet aerosolized medication criteria as          outlined in this protocol. IV. Responsibility: Director, [address], registered Respiratory Care                                                     Practitioners (RCPs) with documented competency in the performance of                                     respiratory therapeutic techniques. V. Equipment needed:  A. Stethoscope  B. Pulse oximeter  C. IPPB machine and circuit  D. Aerosol nebulizer  E. MDI Inhaler     VI. Protocol:   A. The following conditions are accepted indications for aerosolized medication therapy. 1. Bronchospasm/wheezing  2. Impaired mucociliary clearance  3. Tracheobronchial mucosal congestion/and laryngeal stridor  4. Diseases which commonly require aerosolized medication therapy include, but are not limited to:  a. Asthma/reactive airway disease  b. Bronchitis/emphysema (COPD)  c. Cystic fibrosis  d. Severe laryngitis/tracheitis  e. Bronchiectasis  f. Smoke inhalation or chemical trauma to the lung or upper airway  g. Physical trauma to the upper airway  h. Laryngotracheobronchitis  i. Bronchiolitis  j. Non-specific wheezing              B. Indications for bronchodilator medications will include:  a.  Bronchospasm/ wheezing  b. Asthma/reactive airway disease  c. Chronic obstructive pulmonary disease  d. Obstructive defect on pulmonary function testing  C. Administration of medications  1. If a bronchodilator or any other type of respiratory medication is needed, a physician order must be indicated in the medication section in the patients EMR. 2. When the physician specifies the medication and dosage at the time of request, the ordered medication will be used as part of the care plan. D. The following guidelines will be utilized in the evaluation and selection of the         appropriate delivery device for indicated medication(s):  1. IPPB  a. Ventilation is inadequate (rapid shallow breathing)  b. Refractory atelectasis has developed, other forms of therapy are unsuccessful  c. Inability to clear secretions  d. Need to improve lung expansion  2. Unassisted aerosol (UA) is the preferred method of aerosol delivery and indicated if  a. Ventilation is inadequate  b. Patient demonstrates wheezing   c. patient is unable to perform MDI effectively   d. Patient preference  3. Metered Dose Inhaler (MDI)   a. Patient is alert/cooperative  b. Medication(s) available in this delivery method. c. Able to perform 3 second breath hold. d. Patient has demonstrated ability to use MDI effectively  e. Patient has used MDI therapy previously, either at home or in the hospital.  f. Note: The only approved inhalers on formulary are albuterol and Spiriva. VII. Guidelines:   Monitor patients vital signs and evaluate patients clinical status. The need to change medication and/or modality may be indicated by:  1. A pulse greater than 120 bpm, or if a pulse increase of 20 bpm occurs with bronchodilator medications. 2. Significant worsening of dyspnea or wheezing occurring during or within 30 minutes of discontinuing therapy.   3. Worsening of patients sensorium (e.g. patient becomes confused or obtunded, and unable to follow directions). 4. Worsening of patients chest x-ray. 5. Change in sputum (e.g. increased pulmonary infiltrate, which might indicate need for volume expansion therapy). 6. Patient has difficulty coughing up secretions, which might indicate need for acetylcysteine and/or bronchial hygiene therapy. 7. Call physician immediately if dyspnea worsens and is not responsive to modifications allowed by protocol. VIII. Clinical Responsibility:  A. The therapy assessment guidelines will be used to evaluate all patients receiving aerosolized medications with the exception of critical care areas. 1. RCPs will perform changes in therapy per protocol. 2. It will be the responsibility of RCP to provide instruction regarding respiratory medications, aerosol therapy and proper MDI technique, as well as, spacer usage to patients ordered MDI therapy. 3. Current therapy that is part of a patients home regimen will not be discontinued. IX. Documentation  A. Document assessment findings in the respiratory assessment section of the patients EMR. B. Document changes in therapy per protocol in the respiratory orders section and in the care plan section of the patients EMR. C. Document patient education in the patient education section of the patients EMR. X. Outcome Criteria:  A. Relief of wheezes and obstruction  B. Improved cough and sputum color and consistency  C. Improved chest x-ray  D. Improved arterial oxygen tension and or SaO2  E. Improved Peak Flow on asthmatic patients        XI. Related Protocols:  A. Respiratory Patient Care Protocols  B. Bronchial Hygiene Therapy  C.  Oxygen Protocol    Reference:                        Respiratory Care Services     Policy Number: -[id number]    Title: Patient Care Assessment Program    Effective Date: 01/1999    Revised Date: 05/2014    Reviewed Date: 06/2013/ 03/2015, 03/2016, 06/2017     Overview  In an effort to improve quality and reduce costs of respiratory care at [facility], the Respiratory Department has developed a number of Patient Care Protocols. These protocols have been developed according to Dino 3 and are utilized for those patients who are ordered respiratory therapy using therapeutic indications and standardized approaches for accomplishing objectives. Patient Care Protocols are intended to improve care by:   Defining the indications and standards of care agreed upon by the Pulmonary Medicine and [address] [place] [address] of [facility].  Training respiratory care practitioners to apply those criteria to individual patients and modify therapy as indicated by the protocols.  Documenting the indication and care plan as part of the initial ordering process.  Tapering or discontinuing treatments once the indication for therapy changes. The Patient Care Protocols shall be universally applied throughout the hospital as determined by   the Pulmonary Medicine and [address] [place] [address]. Rationale for Patient Care Assessment Protocols:   Continuous Quality Improvement   Cost containment   Standardization of care   Enhanced continuity of care   Utilization review   Timely intervention    The following patient care assessment protocols have been developed:   Aerosolized Medication    Bronchial Hygiene    Oxygen Weaning Protocol   Volume Expansion/Secretion Clearance Non-Vented   Ventilator Weaning Protocol   CVRU Fast Track Weaning Protocol   Asthma Treatment Protocol ER   Pediatric Asthma Treatment Protocol ER   Alpha-1 Antitrypsin Deficiency Protocol   Prone Positioning Protocol    The Director of Respiratory Care Services oversees the Patient Care Assessment Program. The Clinical/ is responsible for protocol development and training. The Supervisor is responsible for implementation and  activities.      Each patient with an order for respiratory treatments will receive an evaluation. All Registered Respiratory Care Practitioners (RCPs) will perform the evaluations. The same evaluation tool will be utilized for all initial and follow-up assessments. If the patient does not meet criteria for ordered therapy, the therapy will be discontinued. If the patient demonstrates an adverse response to initially ordered therapy, the therapy will be discontinued and the physician will be contacted. Specific physician's orders that deviate from protocols and are deemed \"inappropriate\" or \"unsafe\" will be addressed with ordering physician and/or medical director as required. Patients of [facility] and [address] will not be assessed for the first 24 hours as the Medical Director of [address] [place] [address] has requested that changes in therapy should not be made during that time frame. Respiratory Patient Care Assessment Protocols                           I.  Policy: In an effort to provide quality patient care and effective utilization of services, physicians who order respiratory therapy will have their patients treated via the protocols established (see attached). All Registered [facility] Street (RCPs) will complete the initial assessment. This assessment will indicate patient needs, and the care plan developed for the patient and will performed within 24 hours of admission. Frequency of the therapy will be set according to the results of the respiratory therapy evaluation and frequency guidelines policy. Reassessment will be continued done every 48 hours and more frequently as needed for the individual patient. II. Purpose:     A. To provide a process that will allow for ongoing assessment and care plan modification for patients receiving respiratory services based on both objective and or subjective patient responses to interventions.  This process of protocol utilization will assist in patient care progression while eliminating the need for the physician to continually update respiratory therapy orders. B. To assure continuity of respiratory care that meets [facility] Clinical Practice Guidelines. III. Initiation:  Implement Respiratory Care Protocols for patients who are ordered by physician          to receive respiratory therapy procedures or for ventilator management. IV. Protocol:  A. Upon receiving an order for therapy the RCP will review the patients EMR (electronic medical record) for all pertinent information includin. Physicians order for therapy  2. Patient history and physical examination  3. Physician progress notes  4. Diagnostic. X-rays, PFTs, arterial blood gases etc.      B. The RCP will perform a respiratory assessment in the following manner:  1. Perform hand hygiene per hospital policy utilizing Standard Precautions for all patients and following transmission-based isolation as indicated per policy. 2. Identify patient via ID bracelet verifying patient name and birth date. 3. General observations: color, pattern and effort of breathing, chest expansion, (symmetrical and bilateral), level of consciousness and the ability to ambulate. 4. The RCP will assess patients cough ability and determine if Nasotracheal suctioning is needed. If patient is unable to produce sputum, at that time, the RCP should question the patient with regard to their sputum: production, color consistency, frequency and amount. 5. Auscultation: Using a stethoscope, the RCP will listen and note quality of breath sounds and presence or absence of adventitious breath sounds in all lung fields, both anteriorly and posteriorly. 6. Upon completing the EMR review and physical assessment, the RCP will document findings in the RT Assessment section of the EMR. The score level will be provided and will be used to determine the frequency of therapy.     V. Indications:   A. Indications for Bronchial Hygiene Protocol will include:  1. Potential for or presence of atelectasis. 2. Need for hydration and removal of retained secretions. 3. Need for improvement of cough effectiveness. 4. Presence of conditions associated with disorder of pulmonary clearance:  a. Cystic fibrosis  b. Bronchiectasis  B. Indications for Aerosolized Medication(s) Protocol should include:  1. Treatment of bronchospasm/wheezing  2. Improvement of mucociliary clearance  3. Treatment of stridor  4. History of Asthma or COPD             C.  Indications for Oxygen Therapy Protocol should include:  1. Documented hypoxemia  2. Severe trauma  3. Acute myocardial infarction  4. Short-term therapy (e.g. post anesthesia recovery)    VI. Maintenance:    A. Timely patient assessment is an integral part of this protocol therefore the following will be applied:  1. All non- critical care patients will be evaluated upon receiving initial respiratory care orders within 24 hours and re-evaluated within 48 hours (or more as needed). 2. Orders requesting a Respiratory Consult will be responded to in the following manner:  a. In patient emergency situations, the RCP assigned to the floor will respond immediately to the patient, provide an initial respiratory assessment, and contact the patients physician as necessary for appropriate orders. b. In non-emergent situations, the RCP assigned to the floor will respond to the patient within 90 minutes and provide an initial respiratory assessment and contact patients physician as necessary for appropriate orders. c. An RCP will provide a comprehensive assessment as soon as possible. 3. Upon completion of an evaluation, the RCP will complete documentation in the patients EMR in the RT Assessment section. 4. The RCP who completes the assessment will document orders for therapy in the orders section of the patients EMR selecting new order.  Next, per protocol should be selected indicating it is a protocol order and sign orders should be selected to complete the process. 5. The Pharmacy and Therapeutics (P&T) Committee has mandated that the medication Xopenex may be changed to unit dose albuterol without an order, except for those patients receiving Xopenex due to cardiac arrhythmias. The dosage for these patients should be 0.63 mg. and may be changed from 1.25 mg. to 0.63 mg per P & T Committee by the RCP completing the assessment. 6. Patients who are not experiencing cardiac arrhythmias, and are ordered Xopenex and Atrovent may be changed to Duoneb. VII. Safety:        A. The following safety issues shall be monitored:  1. The RCP will perform hand hygiene per hospital policy utilizing Standard Precautions for all patients and following transmission-based isolation as indicated per hospital policy. 2. The RCP must exercise professional judgment in classifying the patient for frequency of therapy. 3. Appropriate classification of the patient will require an evaluation utilizing the Therapy Assessment Protocol Guidelines. 4. The RCP will confer with the physician concerning the care of the patient at any time questions or problems arise. 5. If during therapy, the patient exhibits no improvement or deterioration in clinical status the RCP will notify the physician and the patients nurse. VIII. Interventions:   A. The patients nurse is responsible concerning all items related to his/her care. Ongoing communication with nursing is essential to successful protocol management. B. The RCP recognizes the value of the team approach in meeting the patients needs. Nursing input regarding the patients pulmonary condition will be sought as needed. IX. Reportable conditions: The RCP will inform the physician if:  1. There are acute changes in patients respiratory status. 2. The therapist is unable to determine appropriate care plan upon assessment.   3. The patient fails to reach therapeutic objective. 4. A change or additional medication is needed. X.  Patient Education:    A. Patient will receive instruction on the followin. The treatment modality, including objectives and proper technique of therapy  2. Respiratory medications  B. Documentation shall occur in the patient education section of the patients EMR. XI. Documentation: Record all findings as described above in the patients EMR. Related Protocols: A. Aerosolized Medication Protocol  B. Bronchial Hygiene  C. Oxygen Protocol   D. Volume Expansion/Secretion Clearance  E. Ventilator Weaning Protocols    References:  N   Joint Commission Consolidated [employer] Standard   L    Respiratory Care Department Policy, Procedure and Protocol Guideline Manual, , SANTANA Collier. L  Therapist Driven Respiratory Care Protocols  A Practitioners Guide for Criteria-Based Respiratory Care by Jaleel Barney M.D., and SANTANA Barriga RRT. L  The rationale for therapist-driven protocols: an update. Respiratory Care 1998; [facility] Guidelines.

## 2018-04-16 NOTE — PROGRESS NOTES
Patient states she has had positive PPD tests in the past.  Ordered PPD held.   Will inform MDs of the need to read a CXR to R/O active TB

## 2018-04-16 NOTE — PROGRESS NOTES
Physical Therapy Note:    Therapist is discontinuing physical therapy at this time due to a decline in medical status evidenced by a transfer to ICU. Ms. Marisela Velazquez physical therapy evaluation has not been completed. Please reorder PT when our services are again appropriate. Thank you.     Reida Lundborg, PTA  4/16/2018

## 2018-04-16 NOTE — PROGRESS NOTES
Patient still intermitently on bipap support for COPD exacerbation. Have spoken with pulm who is in agreement with hospitalist signing off. Please recall when patient stable for floor transfer.

## 2018-04-16 NOTE — PROGRESS NOTES
Bedside report from Sudheer Greco RN for care. Pt on BiPap and 96% 02 sat now. Sinus Terry at 52/min as she has been. Bed alarm on. Family members are calling the desk for updats, but do not have the privacy code so HIPAA rule explained to the caller and encouraged him to call the pt's son for info.

## 2018-04-17 PROBLEM — E66.01 MORBID OBESITY (HCC): Status: ACTIVE | Noted: 2018-04-17

## 2018-04-17 LAB
ARTERIAL PATENCY WRIST A: POSITIVE
BASE EXCESS BLDA CALC-SCNC: 8.3 MMOL/L (ref 0–3)
BDY SITE: ABNORMAL
COHGB MFR BLD: 0.8 % (ref 0.5–1.5)
DO-HGB BLD-MCNC: 3 % (ref 0–5)
FIO2 ON VENT: 35 %
GLUCOSE BLD STRIP.AUTO-MCNC: 265 MG/DL (ref 65–100)
GLUCOSE BLD STRIP.AUTO-MCNC: 280 MG/DL (ref 65–100)
GLUCOSE BLD STRIP.AUTO-MCNC: 323 MG/DL (ref 65–100)
GLUCOSE BLD STRIP.AUTO-MCNC: 334 MG/DL (ref 65–100)
HCO3 BLDA-SCNC: 35 MMOL/L (ref 22–26)
HGB BLDMV-MCNC: 13.4 GM/DL (ref 11.7–15)
METHGB MFR BLD: 0.5 % (ref 0–1.5)
OXYHGB MFR BLDA: 95.5 % (ref 94–97)
PCO2 BLDA: 61 MMHG (ref 35–45)
PH BLDA: 7.39 [PH] (ref 7.35–7.45)
PO2 BLDA: 90 MMHG (ref 80–105)
SAO2 % BLD: 97 % (ref 92–98.5)
SERVICE CMNT-IMP: ABNORMAL
VENTILATION MODE VENT: ABNORMAL

## 2018-04-17 PROCEDURE — 74011250637 HC RX REV CODE- 250/637: Performed by: INTERNAL MEDICINE

## 2018-04-17 PROCEDURE — 74011636637 HC RX REV CODE- 636/637: Performed by: NURSE PRACTITIONER

## 2018-04-17 PROCEDURE — 94660 CPAP INITIATION&MGMT: CPT

## 2018-04-17 PROCEDURE — 82803 BLOOD GASES ANY COMBINATION: CPT

## 2018-04-17 PROCEDURE — 65270000029 HC RM PRIVATE

## 2018-04-17 PROCEDURE — 94640 AIRWAY INHALATION TREATMENT: CPT

## 2018-04-17 PROCEDURE — 74011250636 HC RX REV CODE- 250/636: Performed by: INTERNAL MEDICINE

## 2018-04-17 PROCEDURE — 77030032490 HC SLV COMPR SCD KNE COVD -B

## 2018-04-17 PROCEDURE — 99233 SBSQ HOSP IP/OBS HIGH 50: CPT | Performed by: INTERNAL MEDICINE

## 2018-04-17 PROCEDURE — 77010033678 HC OXYGEN DAILY

## 2018-04-17 PROCEDURE — 74011636637 HC RX REV CODE- 636/637: Performed by: INTERNAL MEDICINE

## 2018-04-17 PROCEDURE — 82962 GLUCOSE BLOOD TEST: CPT

## 2018-04-17 PROCEDURE — 74011250637 HC RX REV CODE- 250/637: Performed by: NURSE PRACTITIONER

## 2018-04-17 PROCEDURE — 74011000250 HC RX REV CODE- 250: Performed by: INTERNAL MEDICINE

## 2018-04-17 PROCEDURE — 94760 N-INVAS EAR/PLS OXIMETRY 1: CPT

## 2018-04-17 PROCEDURE — 36600 WITHDRAWAL OF ARTERIAL BLOOD: CPT

## 2018-04-17 RX ORDER — GUAIFENESIN 600 MG/1
1200 TABLET, EXTENDED RELEASE ORAL 2 TIMES DAILY
Status: DISCONTINUED | OUTPATIENT
Start: 2018-04-17 | End: 2018-04-19 | Stop reason: HOSPADM

## 2018-04-17 RX ORDER — INSULIN LISPRO 100 [IU]/ML
7 INJECTION, SOLUTION INTRAVENOUS; SUBCUTANEOUS
Status: DISCONTINUED | OUTPATIENT
Start: 2018-04-17 | End: 2018-04-17

## 2018-04-17 RX ORDER — IBUPROFEN 200 MG
1 TABLET ORAL EVERY 24 HOURS
Status: DISCONTINUED | OUTPATIENT
Start: 2018-04-17 | End: 2018-04-19 | Stop reason: HOSPADM

## 2018-04-17 RX ORDER — INSULIN LISPRO 100 [IU]/ML
10 INJECTION, SOLUTION INTRAVENOUS; SUBCUTANEOUS
Status: DISCONTINUED | OUTPATIENT
Start: 2018-04-18 | End: 2018-04-19 | Stop reason: HOSPADM

## 2018-04-17 RX ADMIN — LOSARTAN POTASSIUM 25 MG: 50 TABLET ORAL at 08:13

## 2018-04-17 RX ADMIN — HEPARIN SODIUM 5000 UNITS: 5000 INJECTION, SOLUTION INTRAVENOUS; SUBCUTANEOUS at 06:21

## 2018-04-17 RX ADMIN — NYSTATIN: 100000 POWDER TOPICAL at 22:05

## 2018-04-17 RX ADMIN — INSULIN LISPRO 7 UNITS: 100 INJECTION, SOLUTION INTRAVENOUS; SUBCUTANEOUS at 17:13

## 2018-04-17 RX ADMIN — HYDROCODONE BITARTRATE AND ACETAMINOPHEN 1 TABLET: 10; 325 TABLET ORAL at 17:12

## 2018-04-17 RX ADMIN — PANTOPRAZOLE SODIUM 40 MG: 40 TABLET, DELAYED RELEASE ORAL at 08:15

## 2018-04-17 RX ADMIN — BUDESONIDE 500 MCG: 0.5 INHALANT RESPIRATORY (INHALATION) at 19:52

## 2018-04-17 RX ADMIN — INSULIN LISPRO 6 UNITS: 100 INJECTION, SOLUTION INTRAVENOUS; SUBCUTANEOUS at 08:13

## 2018-04-17 RX ADMIN — IPRATROPIUM BROMIDE AND ALBUTEROL SULFATE 3 ML: .5; 3 SOLUTION RESPIRATORY (INHALATION) at 13:53

## 2018-04-17 RX ADMIN — NYSTATIN: 100000 POWDER TOPICAL at 08:15

## 2018-04-17 RX ADMIN — LEVOTHYROXINE SODIUM 100 MCG: 100 TABLET ORAL at 08:14

## 2018-04-17 RX ADMIN — INSULIN GLARGINE 80 UNITS: 100 INJECTION, SOLUTION SUBCUTANEOUS at 21:53

## 2018-04-17 RX ADMIN — Medication 10 ML: at 13:56

## 2018-04-17 RX ADMIN — HEPARIN SODIUM 5000 UNITS: 5000 INJECTION, SOLUTION INTRAVENOUS; SUBCUTANEOUS at 13:56

## 2018-04-17 RX ADMIN — INSULIN LISPRO 8 UNITS: 100 INJECTION, SOLUTION INTRAVENOUS; SUBCUTANEOUS at 11:19

## 2018-04-17 RX ADMIN — METHYLPREDNISOLONE SODIUM SUCCINATE 60 MG: 125 INJECTION, POWDER, FOR SOLUTION INTRAMUSCULAR; INTRAVENOUS at 21:52

## 2018-04-17 RX ADMIN — INSULIN LISPRO 8 UNITS: 100 INJECTION, SOLUTION INTRAVENOUS; SUBCUTANEOUS at 17:13

## 2018-04-17 RX ADMIN — BUDESONIDE 500 MCG: 0.5 INHALANT RESPIRATORY (INHALATION) at 07:30

## 2018-04-17 RX ADMIN — GUAIFENESIN 1200 MG: 600 TABLET, EXTENDED RELEASE ORAL at 17:12

## 2018-04-17 RX ADMIN — IPRATROPIUM BROMIDE AND ALBUTEROL SULFATE 3 ML: .5; 3 SOLUTION RESPIRATORY (INHALATION) at 19:52

## 2018-04-17 RX ADMIN — Medication 1 AMPULE: at 22:07

## 2018-04-17 RX ADMIN — Medication 1 AMPULE: at 08:13

## 2018-04-17 RX ADMIN — IPRATROPIUM BROMIDE AND ALBUTEROL SULFATE 3 ML: .5; 3 SOLUTION RESPIRATORY (INHALATION) at 02:22

## 2018-04-17 RX ADMIN — AZITHROMYCIN 500 MG: 250 TABLET, FILM COATED ORAL at 21:48

## 2018-04-17 RX ADMIN — GUAIFENESIN 1200 MG: 600 TABLET, EXTENDED RELEASE ORAL at 08:14

## 2018-04-17 RX ADMIN — HYDROCODONE BITARTRATE AND ACETAMINOPHEN 1 TABLET: 10; 325 TABLET ORAL at 10:10

## 2018-04-17 RX ADMIN — NIACIN 500 MG: 500 TABLET, EXTENDED RELEASE ORAL at 21:48

## 2018-04-17 RX ADMIN — CLOPIDOGREL BISULFATE 75 MG: 75 TABLET ORAL at 08:14

## 2018-04-17 RX ADMIN — ASPIRIN 81 MG: 81 TABLET, COATED ORAL at 08:15

## 2018-04-17 RX ADMIN — Medication 10 ML: at 21:53

## 2018-04-17 RX ADMIN — METHYLPREDNISOLONE SODIUM SUCCINATE 60 MG: 125 INJECTION, POWDER, FOR SOLUTION INTRAMUSCULAR; INTRAVENOUS at 06:21

## 2018-04-17 RX ADMIN — HYDROCODONE BITARTRATE AND ACETAMINOPHEN 1 TABLET: 10; 325 TABLET ORAL at 22:05

## 2018-04-17 RX ADMIN — ROSUVASTATIN CALCIUM 10 MG: 5 TABLET, FILM COATED ORAL at 21:48

## 2018-04-17 RX ADMIN — IPRATROPIUM BROMIDE AND ALBUTEROL SULFATE 3 ML: .5; 3 SOLUTION RESPIRATORY (INHALATION) at 07:30

## 2018-04-17 RX ADMIN — INSULIN LISPRO 6 UNITS: 100 INJECTION, SOLUTION INTRAVENOUS; SUBCUTANEOUS at 21:54

## 2018-04-17 RX ADMIN — ALPRAZOLAM 1 MG: 0.5 TABLET ORAL at 09:01

## 2018-04-17 RX ADMIN — Medication 10 ML: at 06:24

## 2018-04-17 RX ADMIN — PANTOPRAZOLE SODIUM 40 MG: 40 TABLET, DELAYED RELEASE ORAL at 17:13

## 2018-04-17 RX ADMIN — ALPRAZOLAM 1 MG: 0.5 TABLET ORAL at 22:05

## 2018-04-17 NOTE — PROGRESS NOTES
Critical Care Outreach Nurse Progress Report:    Subjective: In to assess pt secondary to transfer from ICU    MEWS Score: 2 (04/17/18 1203)    Vitals:    04/17/18 1103 04/17/18 1203 04/17/18 1302 04/17/18 1402   BP: 138/63 131/61 132/60 121/56   Pulse: 61 69 60 61   Resp: 17 27 20 16   Temp:  99 °F (37.2 °C)     SpO2: 95% 93% 95% 94%   Weight:       Height:            LAB DATA:    Recent Labs      04/16/18   0338  04/15/18   0540  04/14/18 2124   NA  136  135*  140   K  5.0  4.5  4.4   CL  97*  96*  101   CO2  32  32  32   AGAP  7  7  7   GLU  277*  505*  186*   BUN  20  14  11   CREA  1.11*  1.03*  0.79   GFRAA  >60  >60  >60   GFRNA  52*  57*  >60   CA  8.2*  8.6  9.2   MG  2.0  2.0   --    PHOS  3.5   --    --    ALB   --    --   3.2   TP   --    --   7.7   GLOB   --    --   4.5*   AGRAT   --    --   0.7*   ALT   --    --   19        Recent Labs      04/16/18   0338  04/15/18   0540  04/14/18 2124   WBC  12.9*  8.7  8.2   HGB  12.4  12.9  14.4   HCT  38.9  40.1  43.8   PLT  235  222  205        Objective: Alert ambulated to new bed. Pain Intensity 1: 0 (04/17/18 1203)  Pain Location 1: Back  Pain Intervention(s) 1: Medication (see MAR), Distraction, Relaxation technique, Repositioned, Rest, Therapeutic touch, Therapeutic presence  Patient Stated Pain Goal: 0    Assessment: Patient admit for AMS, back pain, SOB placed on BiPAP. Patient now on 3 lpm NC, AAO. Patient has no complaints at this time.      Plan: Follow per Outreach protocol

## 2018-04-17 NOTE — PROGRESS NOTES
TRANSFER - IN REPORT:    Verbal report received from [address] on Nayana Espinoza  being received from ICU for routine progression of care      Report consisted of patients Situation, Background, Assessment and   Recommendations(SBAR). Information from the following report(s) SBAR and Recent Results was reviewed with the receiving nurse. Opportunity for questions and clarification was provided. Assessment completed upon patients arrival to unit and care assumed.

## 2018-04-17 NOTE — PROGRESS NOTES
TRANSFER - OUT REPORT:    Verbal report given to [address], RN on [address]  being transferred to [address] [place] [address]  for routine progression of care       Report consisted of patients Situation, Background, Assessment and   Recommendations(SBAR). Information from the following report(s) SBAR, Kardex, ED Summary, Intake/Output, MAR, Accordion, Recent Results, Med Rec Status and Cardiac Rhythm SB/SR was reviewed with the receiving nurse. Lines:   Peripheral IV 04/15/18 Left Hand (Active)   Site Assessment Clean, dry, & intact 4/17/2018  7:30 AM   Phlebitis Assessment 0 4/17/2018  7:30 AM   Infiltration Assessment 0 4/17/2018  7:30 AM   Dressing Status Clean, dry, & intact 4/17/2018  7:30 AM   Dressing Type Tape;Transparent 4/17/2018  7:30 AM   Hub Color/Line Status Blue;Flushed;Patent 4/17/2018  7:30 AM   Alcohol Cap Used No 4/17/2018  7:30 AM       Peripheral IV 04/15/18 Left Hand (Active)   Site Assessment Clean, dry, & intact 4/17/2018  7:30 AM   Phlebitis Assessment 0 4/17/2018  7:30 AM   Infiltration Assessment 0 4/17/2018  7:30 AM   Dressing Status Clean, dry, & intact 4/17/2018  7:30 AM   Dressing Type Tape;Transparent 4/17/2018  7:30 AM   Hub Color/Line Status Pink;Flushed;Patent 4/17/2018  7:30 AM   Alcohol Cap Used No 4/17/2018  7:30 AM        Opportunity for questions and clarification was provided.       Patient transported with:   O2 @ 3 liters  Patient-specific medications from [facility]     Patient on contact precautions for lice; reviewed with receiving  [address]

## 2018-04-17 NOTE — PROGRESS NOTES
Pt arrived to room 808 via bed from ICU. Pt placed on contact precautions. Pt alert oriented times 3 at this time. Pt on 3 L NC at this time. Pt complaints of left rib area pain 7/10 at this time. Pt has no acute distress noted at this time. Pt has redness under abd fold, no breakdown noted at to sacrum at this time. allevyn in place. Pt skin assessed with Mya Aponte RN. Pt oriented to room and surroundings. Pt encouraged to call for assistance if needed call light in reach, door open will monitor.

## 2018-04-17 NOTE — PROGRESS NOTES
Critical Care Daily Progress Note: 4/17/2018    Adia Pickering   Admission Date: 4/14/2018         The patient's chart is reviewed and the patient is discussed with the staff. 75 y.o. CF evaluated at the request of Dr. Pooja Huffman. Has history of recurrent/persistent lice, DM, hypothyroidism, GERD, IBS, HTN, HL, CAD, JEREMÍAS on O2 at 3 lpm with sleep (does not wear CPAP), COPD with rescue inhaler only, and tobacco abuse (~50 pack year history). Sustained a fall at home 2 days prior to presentation, was unable to walk and family to called EMS. Reported increased sob, was recently prescribed a Zpack which had not been started. In ER was hypoxic requiring 5L O2 to maintain sats in the 90s. Had ongoing wheezing and was admitted. No rib or hip fx on imaging. Developed increased confusion and worsening hypoxia and RR was called. Was placed on BIPAP and transferred to ICU. Was placed on isolation for head lice. Continued supplemental O2 and was able to wean off BIPAP. Subjective:     Lying in bed, oriented, conversant and denies shortness of breath. No significant cough and no sputum production. Wore BIPAP all night and asking for it to be taken off. Has a home CPAP machine but doesn't know where it is--family will try to locate.       Current Facility-Administered Medications   Medication Dose Route Frequency    alcohol 62% (NOZIN) nasal  1 Ampule  1 Ampule Topical Q12H    guaiFENesin ER (MUCINEX) tablet 1,200 mg  1,200 mg Oral BID    methylPREDNISolone (PF) (SOLU-MEDROL) injection 60 mg  60 mg IntraVENous Q12H    azithromycin (ZITHROMAX) tablet 500 mg  500 mg Oral Q24H    budesonide (PULMICORT) 500 mcg/2 ml nebulizer suspension  500 mcg Nebulization BID RT    albuterol-ipratropium (DUO-NEB) 2.5 MG-0.5 MG/3 ML  3 mL Nebulization Q6H RT    tuberculin injection 5 Units  5 Units IntraDERMal ONCE    albuterol-ipratropium (DUO-NEB) 2.5 MG-0.5 MG/3 ML  3 mL Nebulization Q6H PRN  sodium chloride (NS) flush 5-10 mL  5-10 mL IntraVENous Q8H    sodium chloride (NS) flush 5-10 mL  5-10 mL IntraVENous PRN    acetaminophen (TYLENOL) tablet 650 mg  650 mg Oral Q4H PRN    heparin (porcine) injection 5,000 Units  5,000 Units SubCUTAneous Q8H    aspirin delayed-release tablet 81 mg  81 mg Oral DAILY    clopidogrel (PLAVIX) tablet 75 mg  75 mg Oral DAILY    levothyroxine (SYNTHROID) tablet 100 mcg  100 mcg Oral ACB    loperamide (IMODIUM) capsule 2 mg  2 mg Oral Q4H PRN    losartan (COZAAR) tablet 25 mg  25 mg Oral DAILY    niacin ER (NIASPAN) tablet 500 mg  500 mg Oral QHS    nitroglycerin (NITROSTAT) tablet 0.4 mg  0.4 mg SubLINGual PRN    pantoprazole (PROTONIX) tablet 40 mg  40 mg Oral ACB&D    rosuvastatin (CRESTOR) tablet 10 mg  10 mg Oral QHS    insulin lispro (HUMALOG) injection   SubCUTAneous AC&HS    nystatin (MYCOSTATIN) 100,000 unit/gram powder   Topical BID    insulin glargine (LANTUS) injection 80 Units  80 Units SubCUTAneous QHS    ALPRAZolam (XANAX) tablet 1 mg  1 mg Oral TID PRN    HYDROcodone-acetaminophen (NORCO)  mg tablet 1 Tab  1 Tab Oral Q6H PRN       Review of Systems  Constitutional:  negative for fever, chills, sweats  Cardiovascular:  negative for chest pain, palpitations, syncope, edema  Gastrointestinal:  negative for dysphagia, reflux, vomiting, diarrhea, abdominal pain, or melena  Neurologic:  negative for focal weakness, numbness, headache      Objective:     Vitals:    04/17/18 0601 04/17/18 0703 04/17/18 0730 04/17/18 0813   BP: 126/61 117/56  123/57   Pulse: (!) 56 (!) 49  60   Resp: 14 19     Temp:       SpO2: 96% 97% 95%    Weight:       Height:           Intake and Output:   04/15 1901 - 04/17 0700  In: 980 [P.O.:730;  I.V.:250]  Out: 1825 [Urine:1825]       Physical Exam:          Constitutional:  the patient is obese and in no acute distress, BIPAP 35%, sat 93%  EENMT:  Sclera clear, pupils equal, oral mucosa moist, head lice  Respiratory: Posterior crackles and wheezes  Cardiovascular:  Regular, sinus bradycardia HR 50s without M,G,R  Gastrointestinal: soft, obese and non-tender; with positive bowel sounds. Musculoskeletal: warm without cyanosis.  There is no lower leg edema, SCDs  Skin:  no jaundice or rashes, no open wounds   Neurologic: no gross neuro deficits     Psychiatric:  alert and oriented x 3    LINES:    PIV sites  Sawyer 4/15/18    DRIPS:   none    CXR: None today    CT Head 4/15/18:  No acute intracranial findings    CXR:  None today    CXR 4/15/18:  No acute abnormality      LAB  Recent Labs      04/16/18   2134  04/16/18   1628  04/16/18   1302  04/16/18   0832  04/15/18   2118   GLUCPOC  256*  247*  264*  294*  349*      Recent Labs      04/16/18   0338  04/15/18   0540  04/14/18   2124   WBC  12.9*  8.7  8.2   HGB  12.4  12.9  14.4   HCT  38.9  40.1  43.8   PLT  235  222  205   INR   --    --   1.0     Recent Labs      04/16/18   0338  04/15/18   0540  04/14/18   2124   NA  136  135*  140   K  5.0  4.5  4.4   CL  97*  96*  101   CO2  32  32  32   GLU  277*  505*  186*   BUN  20  14  11   CREA  1.11*  1.03*  0.79   MG  2.0  2.0   --    PHOS  3.5   --    --    CA  8.2*  8.6  9.2   ALB   --    --   3.2   TBILI   --    --   0.5   ALT   --    --   19   SGOT   --    --   30     Recent Labs      04/17/18   0653  04/16/18   0815  04/15/18   1210   PH  7.39  7.30*  7.24*   PCO2  61*  69*  75*   PO2  90  88  73*   HCO3  35*  34*  31*         Assessment:  (Medical Decision Making)     Hospital Problems  Date Reviewed: 4/17/2018          Codes Class Noted POA    DM (diabetes mellitus) w/o complication (Chronic) [id number]: E11.9  ICD-9-CM: 250.00  4/15/2018 Yes    Chronic--ranges uncontrolled 247-349      COPD (chronic obstructive pulmonary disease) (HCC) (Chronic) ICD-10-CM: J44.9  ICD-9-CM: 608  4/15/2018 Yes    Chronic      Hypothyroidism (Chronic) ICD-10-CM: E03.9  ICD-9-CM: 244.9  4/15/2018 Yes    Chronic--on supplement ASCAD - of the native vessel (Chronic) ICD-10-CM: I25.110  ICD-9-CM: 414.01, 411.1  4/15/2018 Yes     OV: 12/21/15  No sob-occasional cp but 3 episodes of cp last week- 3ntg         Chronic--no angina      Head lice infestation (Chronic) ICD-10-CM: B85.0  ICD-9-CM: 132.0  4/15/2018 Yes    Treating      Tobacco abuse (Chronic) ICD-10-CM: Z72.0  ICD-9-CM: 305.1  4/15/2018 Yes    Chronic      Nocturnal hypoxemia (Chronic) ICD-10-CM: N71.65  ICD-9-CM: 327.24  4/15/2018 Yes    Uses NC 3L. Has been prescribed CPAP but does not wear      Fall ICD-10-CM: W19. Gilma Ferrell  ICD-9-CM: E888.9  4/15/2018 Yes    Prior to admission--no fractures      * (Principal)Acute respiratory failure [facility]) ICD-10-CM: J96.00  ICD-9-CM: 518.81  4/14/2018 Yes    weaning          Plan:  (Medical Decision Making)     --Duoneb, Pulmicort--add Mucinex  --Zithromax day 3  --Solu Medrol 60mg q6h  --Recent WBC up to 12.9  --ABG reviewed--remove BIPAP and place on NC  --Possible move to the floor later today  --Have asked patient to have family bring her machine for use here and to check equipment. More than 50% of the time documented was spent in face-to-face contact with the patient and in the care of the patient on the floor/unit where the patient is located.     Arabella Ellis MD

## 2018-04-17 NOTE — ROUTINE PROCESS
Bedside, Verbal and Written shift change report given to Ba Kingston RN by Ángel Garcia RN.  Report included the following information SBAR, Kardex, ED Summary, Intake/Output, MAR, Accordion, Recent Results, Med Rec Status and Cardiac Rhythm SB.

## 2018-04-17 NOTE — PROGRESS NOTES
Hospitalist Progress Note    2018  Admit Date: 2018  8:44 PM   NAME: Jeri Ashby   :  1950   MRN:  [id number]   Attending: Celio Leon MD  PCP:  Jcarlos Rodriguez MD    SUBJECTIVE:   74 yo obese white female, with a pmh of COPD on baseline oxygen around 3 liters NC, diabetes and CAD, who presented via EMS on  for shortness of breath, productive cough and decreased responsiveness for the past several days and was admitted for acute copd exacerbation. She fell a few days prior to admission and hip fracture and cxr negative for hip/rib fractures. She was admitted to the floor on nebs, solumedrol and zithromax but became acutely altered and found to be hypercapnic with CO2 on ABG of 69 and acidotic. Transferred to ICU for bipap. On isolation for head lice.       - transitioned to nasal canula and moved to floor. Cristian Suarez Hospitalist asked to assume care again. Reports she is feeling better. Asks about wetzel - doesn't want to remove until AM.     Review of Systems negative with exception of pertinent positives noted above  PHYSICAL EXAM     Visit Vitals    /64    Pulse 69    Temp 98 °F (36.7 °C)    Resp 18    Ht 5' 6\" (1.676 m)    Wt 113.2 kg (249 lb 9 oz)    SpO2 94%    BMI 40.28 kg/m2      Temp (24hrs), Av.5 °F (36.9 °C), Min:98 °F (36.7 °C), Max:99.1 °F (37.3 °C)    Oxygen Therapy  O2 Sat (%): 94 % (18 1515)  Pulse via Oximetry: 62 beats per minute (18 1402)  O2 Device: Nasal cannula (18 1353)  O2 Flow Rate (L/min): 3 l/min (18 1353)  O2 Temperature: 87.8 °F (31 °C) (18 1318)  FIO2 (%): 35 % (18 0225)    Intake/Output Summary (Last 24 hours) at 18 1812  Last data filed at 18 1225   Gross per 24 hour   Intake              250 ml   Output             1250 ml   Net            -1000 ml      General: No acute distress    Lungs:  CTA Bilaterally.    Heart:  Regular rate and rhythm,  No murmur, rub, or gallop  Abdomen: Soft, Non distended, Non tender, Positive bowel sounds  Extremities: No cyanosis, clubbing or edema  Neurologic:  No focal deficits    ASSESSMENT      Active Hospital Problems    Diagnosis Date Noted    Bradycardia 04/16/2018    COPD exacerbation ([address] 04/16/2018    DM (diabetes mellitus) w/o complication 69/75/4644    ASCAD - of the native vessel 04/15/2018     OV: 12/21/15  No sob-occasional cp but 3 episodes of cp last week- 3ntg      Hypothyroidism 34/93/8850    Head lice infestation 79/76/6849    Tobacco abuse 04/15/2018    Nocturnal hypoxemia 04/15/2018    Fall 04/15/2018    Acute on chronic respiratory failure with hypoxia and hypercapnia (HCC) 04/14/2018     A/p  -Acute/chronic resp failure - wean supplemental oxygen as per pulm  - COPD exacerbation - scheduled nebs, IV steroids and azithro  - hypothryoidism - synthroid  - Nocturnal hypoxemia - pt wears CPAP qhs at home - family to bring in  - Dm2/SIHG  - uncontrolled. Increase pre-prandial coverage- watch closely with steroid wean. DVT Prophylaxis: hep sq     Dispo -reports she has not been out of bed since admit.  MEME wetzel in AM and eval with PT. PPD in case    Signed By: Maryellen Larson DO     April 17, 2018

## 2018-04-17 NOTE — PROGRESS NOTES
Pt resting in bed. No distress noted at this time. Pt encouraged to call for assistance if needed call light in reach, door open will monitor.

## 2018-04-18 PROBLEM — E66.01 MORBID OBESITY (HCC): Chronic | Status: ACTIVE | Noted: 2018-04-17

## 2018-04-18 LAB
ANION GAP SERPL CALC-SCNC: 8 MMOL/L (ref 7–16)
BUN SERPL-MCNC: 24 MG/DL (ref 8–23)
CALCIUM SERPL-MCNC: 9.2 MG/DL (ref 8.3–10.4)
CHLORIDE SERPL-SCNC: 100 MMOL/L (ref 98–107)
CO2 SERPL-SCNC: 35 MMOL/L (ref 21–32)
CREAT SERPL-MCNC: 0.78 MG/DL (ref 0.6–1)
ERYTHROCYTE [DISTWIDTH] IN BLOOD BY AUTOMATED COUNT: 13 % (ref 11.9–14.6)
GLUCOSE BLD STRIP.AUTO-MCNC: 163 MG/DL (ref 65–100)
GLUCOSE BLD STRIP.AUTO-MCNC: 164 MG/DL (ref 65–100)
GLUCOSE BLD STRIP.AUTO-MCNC: 230 MG/DL (ref 65–100)
GLUCOSE BLD STRIP.AUTO-MCNC: 284 MG/DL (ref 65–100)
GLUCOSE SERPL-MCNC: 145 MG/DL (ref 65–100)
HCT VFR BLD AUTO: 38.8 % (ref 35.8–46.3)
HGB BLD-MCNC: 12.2 G/DL (ref 11.7–15.4)
MCH RBC QN AUTO: 29 PG (ref 26.1–32.9)
MCHC RBC AUTO-ENTMCNC: 31.4 G/DL (ref 31.4–35)
MCV RBC AUTO: 92.2 FL (ref 79.6–97.8)
PLATELET # BLD AUTO: 238 K/UL (ref 150–450)
PMV BLD AUTO: 11.2 FL (ref 10.8–14.1)
POTASSIUM SERPL-SCNC: 4.5 MMOL/L (ref 3.5–5.1)
RBC # BLD AUTO: 4.21 M/UL (ref 4.05–5.25)
SODIUM SERPL-SCNC: 143 MMOL/L (ref 136–145)
WBC # BLD AUTO: 11.4 K/UL (ref 4.3–11.1)

## 2018-04-18 PROCEDURE — 94761 N-INVAS EAR/PLS OXIMETRY MLT: CPT

## 2018-04-18 PROCEDURE — 97530 THERAPEUTIC ACTIVITIES: CPT

## 2018-04-18 PROCEDURE — 74011000250 HC RX REV CODE- 250: Performed by: INTERNAL MEDICINE

## 2018-04-18 PROCEDURE — 74011250637 HC RX REV CODE- 250/637: Performed by: NURSE PRACTITIONER

## 2018-04-18 PROCEDURE — 74011250637 HC RX REV CODE- 250/637: Performed by: INTERNAL MEDICINE

## 2018-04-18 PROCEDURE — 77030012341 HC CHMB SPCR OPTC MDI VYRM -A

## 2018-04-18 PROCEDURE — 74011250636 HC RX REV CODE- 250/636: Performed by: INTERNAL MEDICINE

## 2018-04-18 PROCEDURE — 94760 N-INVAS EAR/PLS OXIMETRY 1: CPT

## 2018-04-18 PROCEDURE — 77030020120 HC VLV RESP PEP HI -B

## 2018-04-18 PROCEDURE — 97161 PT EVAL LOW COMPLEX 20 MIN: CPT

## 2018-04-18 PROCEDURE — 74011636637 HC RX REV CODE- 636/637: Performed by: INTERNAL MEDICINE

## 2018-04-18 PROCEDURE — 99232 SBSQ HOSP IP/OBS MODERATE 35: CPT | Performed by: INTERNAL MEDICINE

## 2018-04-18 PROCEDURE — 94640 AIRWAY INHALATION TREATMENT: CPT

## 2018-04-18 PROCEDURE — 94660 CPAP INITIATION&MGMT: CPT

## 2018-04-18 PROCEDURE — 85027 COMPLETE CBC AUTOMATED: CPT | Performed by: INTERNAL MEDICINE

## 2018-04-18 PROCEDURE — 77010033678 HC OXYGEN DAILY

## 2018-04-18 PROCEDURE — 82962 GLUCOSE BLOOD TEST: CPT

## 2018-04-18 PROCEDURE — 80048 BASIC METABOLIC PNL TOTAL CA: CPT | Performed by: INTERNAL MEDICINE

## 2018-04-18 PROCEDURE — 65270000029 HC RM PRIVATE

## 2018-04-18 PROCEDURE — 36415 COLL VENOUS BLD VENIPUNCTURE: CPT | Performed by: INTERNAL MEDICINE

## 2018-04-18 RX ORDER — PREDNISONE 10 MG/1
10 TABLET ORAL
Status: DISCONTINUED | OUTPATIENT
Start: 2018-04-25 | End: 2018-04-19 | Stop reason: HOSPADM

## 2018-04-18 RX ORDER — PREDNISONE 20 MG/1
20 TABLET ORAL
Status: DISCONTINUED | OUTPATIENT
Start: 2018-04-23 | End: 2018-04-19 | Stop reason: HOSPADM

## 2018-04-18 RX ORDER — PREDNISONE 20 MG/1
40 TABLET ORAL
Status: DISCONTINUED | OUTPATIENT
Start: 2018-04-19 | End: 2018-04-19 | Stop reason: HOSPADM

## 2018-04-18 RX ORDER — PREDNISONE 20 MG/1
40 TABLET ORAL
Status: DISCONTINUED | OUTPATIENT
Start: 2018-04-19 | End: 2018-04-18

## 2018-04-18 RX ADMIN — IPRATROPIUM BROMIDE AND ALBUTEROL SULFATE 3 ML: .5; 3 SOLUTION RESPIRATORY (INHALATION) at 07:45

## 2018-04-18 RX ADMIN — INSULIN LISPRO 2 UNITS: 100 INJECTION, SOLUTION INTRAVENOUS; SUBCUTANEOUS at 06:03

## 2018-04-18 RX ADMIN — HEPARIN SODIUM 5000 UNITS: 5000 INJECTION, SOLUTION INTRAVENOUS; SUBCUTANEOUS at 21:43

## 2018-04-18 RX ADMIN — PANTOPRAZOLE SODIUM 40 MG: 40 TABLET, DELAYED RELEASE ORAL at 16:47

## 2018-04-18 RX ADMIN — NIACIN 500 MG: 500 TABLET, EXTENDED RELEASE ORAL at 21:39

## 2018-04-18 RX ADMIN — BUDESONIDE 500 MCG: 0.5 INHALANT RESPIRATORY (INHALATION) at 20:45

## 2018-04-18 RX ADMIN — ALPRAZOLAM 1 MG: 0.5 TABLET ORAL at 21:41

## 2018-04-18 RX ADMIN — LEVOTHYROXINE SODIUM 100 MCG: 100 TABLET ORAL at 06:02

## 2018-04-18 RX ADMIN — AZITHROMYCIN 500 MG: 250 TABLET, FILM COATED ORAL at 21:40

## 2018-04-18 RX ADMIN — NYSTATIN: 100000 POWDER TOPICAL at 08:28

## 2018-04-18 RX ADMIN — CLOPIDOGREL BISULFATE 75 MG: 75 TABLET ORAL at 08:28

## 2018-04-18 RX ADMIN — ROSUVASTATIN CALCIUM 10 MG: 5 TABLET, FILM COATED ORAL at 21:39

## 2018-04-18 RX ADMIN — IPRATROPIUM BROMIDE AND ALBUTEROL SULFATE 3 ML: .5; 3 SOLUTION RESPIRATORY (INHALATION) at 14:04

## 2018-04-18 RX ADMIN — HEPARIN SODIUM 5000 UNITS: 5000 INJECTION, SOLUTION INTRAVENOUS; SUBCUTANEOUS at 13:46

## 2018-04-18 RX ADMIN — IPRATROPIUM BROMIDE AND ALBUTEROL SULFATE 3 ML: .5; 3 SOLUTION RESPIRATORY (INHALATION) at 20:45

## 2018-04-18 RX ADMIN — METHYLPREDNISOLONE SODIUM SUCCINATE 60 MG: 125 INJECTION, POWDER, FOR SOLUTION INTRAMUSCULAR; INTRAVENOUS at 08:28

## 2018-04-18 RX ADMIN — GUAIFENESIN 1200 MG: 600 TABLET, EXTENDED RELEASE ORAL at 08:28

## 2018-04-18 RX ADMIN — LOSARTAN POTASSIUM 25 MG: 50 TABLET ORAL at 08:29

## 2018-04-18 RX ADMIN — INSULIN LISPRO 10 UNITS: 100 INJECTION, SOLUTION INTRAVENOUS; SUBCUTANEOUS at 12:08

## 2018-04-18 RX ADMIN — Medication 10 ML: at 12:10

## 2018-04-18 RX ADMIN — INSULIN LISPRO 10 UNITS: 100 INJECTION, SOLUTION INTRAVENOUS; SUBCUTANEOUS at 08:27

## 2018-04-18 RX ADMIN — Medication 1 AMPULE: at 08:28

## 2018-04-18 RX ADMIN — BUDESONIDE 500 MCG: 0.5 INHALANT RESPIRATORY (INHALATION) at 07:45

## 2018-04-18 RX ADMIN — NYSTATIN: 100000 POWDER TOPICAL at 21:46

## 2018-04-18 RX ADMIN — INSULIN LISPRO 10 UNITS: 100 INJECTION, SOLUTION INTRAVENOUS; SUBCUTANEOUS at 16:47

## 2018-04-18 RX ADMIN — INSULIN LISPRO 2 UNITS: 100 INJECTION, SOLUTION INTRAVENOUS; SUBCUTANEOUS at 12:08

## 2018-04-18 RX ADMIN — ASPIRIN 81 MG: 81 TABLET, COATED ORAL at 08:28

## 2018-04-18 RX ADMIN — GUAIFENESIN 1200 MG: 600 TABLET, EXTENDED RELEASE ORAL at 16:47

## 2018-04-18 RX ADMIN — Medication 1 AMPULE: at 21:43

## 2018-04-18 RX ADMIN — INSULIN LISPRO 4 UNITS: 100 INJECTION, SOLUTION INTRAVENOUS; SUBCUTANEOUS at 21:42

## 2018-04-18 RX ADMIN — PANTOPRAZOLE SODIUM 40 MG: 40 TABLET, DELAYED RELEASE ORAL at 06:02

## 2018-04-18 RX ADMIN — Medication 10 ML: at 06:05

## 2018-04-18 RX ADMIN — ALPRAZOLAM 1 MG: 0.5 TABLET ORAL at 16:47

## 2018-04-18 RX ADMIN — HYDROCODONE BITARTRATE AND ACETAMINOPHEN 1 TABLET: 10; 325 TABLET ORAL at 08:27

## 2018-04-18 RX ADMIN — IPRATROPIUM BROMIDE AND ALBUTEROL SULFATE 3 ML: .5; 3 SOLUTION RESPIRATORY (INHALATION) at 01:38

## 2018-04-18 RX ADMIN — HEPARIN SODIUM 5000 UNITS: 5000 INJECTION, SOLUTION INTRAVENOUS; SUBCUTANEOUS at 06:04

## 2018-04-18 RX ADMIN — INSULIN GLARGINE 80 UNITS: 100 INJECTION, SOLUTION SUBCUTANEOUS at 21:42

## 2018-04-18 RX ADMIN — Medication 10 ML: at 22:04

## 2018-04-18 RX ADMIN — INSULIN LISPRO 6 UNITS: 100 INJECTION, SOLUTION INTRAVENOUS; SUBCUTANEOUS at 16:47

## 2018-04-18 NOTE — PROGRESS NOTES
Date of Outreach Update:  Bibi Turcios was seen and assessed. MEWS Score: 1 (04/17/18 1947)  Vitals:    04/17/18 2346 04/18/18 0143 04/18/18 0419 04/18/18 0439   BP:   117/65    Pulse:   (!) 59    Resp:   18    Temp:   97.6 °F (36.4 °C)    SpO2: 98% 97% 96% 95%   Weight:       Height:             Pain Assessment  Pain Intensity 1: 0 (04/18/18 0130)  Pain Location 1: Rib cage  Pain Intervention(s) 1: Medication (see MAR)  Patient Stated Pain Goal: 0      Previous Outreach assessment has been reviewed. Patient asleep still wearing BIPAP mask with 35% FiO2. O2 stat 97%. Spoke with her nurse and nurse has no concerns at this time. Will continue to follow up per outreach protocol.     Signed By:   Fran Emmanuel    April 18, 2018 5:01 AM

## 2018-04-18 NOTE — PROGRESS NOTES
Pt resting in bed with eyes closed. No s/sx of anxiety noted at this time. Call light in reach, door open will monitor.

## 2018-04-18 NOTE — PROGRESS NOTES
Oxygen Qualifier       Room air: SpO2 with O2 and liter flow   Resting SpO2  89%    Ambulating SpO2  86%  87% on 1lnc   91% on 2lnc       Completed by:    Kimberly Perales, RT

## 2018-04-18 NOTE — PROGRESS NOTES
Pt resting in bed. Pt awakens when spoken to. Pt oriented time 3 at this time. Pt on 3 L NC at this time. Pt reports not sleeping welll last night \"bc they tried to put that mask on me\" will monitor. Pt had rashard remove this am. Will monitor for urine output. Pt encouraged to call for assistance if needed call light in reach, door open will monitor.

## 2018-04-18 NOTE — PROGRESS NOTES
Assessment completed. Respirations are even and unlabored. Bowel sounds are active in all quadrates. Patient is alert and oriented. Sawyer is intact and draining. SCDs in place. No distress at this time. safety measures in place.

## 2018-04-18 NOTE — PROGRESS NOTES
Date of Outreach Update:  Bharath Sage was seen and assessed. MEWS Score: 1 (04/17/18 1515)  Vitals:    04/17/18 1302 04/17/18 1402 04/17/18 1515 04/17/18 1947   BP: 132/60 121/56 116/64 110/52   Pulse: 60 61 69 69   Resp: 20 16 18 16   Temp:   98 °F (36.7 °C) 98.3 °F (36.8 °C)   SpO2: 95% 94% 94% 96%   Weight:       Height:             Pain Assessment  Pain Intensity 1: 6 (04/17/18 1712)  Pain Location 1: Rib cage  Pain Intervention(s) 1: Medication (see MAR)  Patient Stated Pain Goal: 0      Previous Outreach assessment has been reviewed. There have been no significant clinical changes since the completion of the last dated Outreach assessment. Patient is on 3 L NC with an O2 stat of 95% and HR 80. Patient resting comfortably in bed with no complaints at this time. Will continue to follow up per outreach protocol.     Signed By:   Onel Simeon    April 17, 2018 9:52 PM

## 2018-04-18 NOTE — PROGRESS NOTES
Ally Pickering  Admission Date: 4/14/2018             Daily Progress Note: 4/18/2018   67 y.o. CF evaluated at the request of Dr. Kaykay Ernandez. Has history of recurrent/persistent lice, DM, hypothyroidism, GERD, IBS, HTN, HL, CAD, JEREMÍAS on O2 at 3 lpm with sleep (does not wear CPAP), COPD with rescue inhaler only, and tobacco abuse (~50 pack year history).    In ER was hypoxic requiring 5L O2 to maintain sats in the 90s.  Developed increased confusion and worsening hypoxia and RR was called.  Was placed on BIPAP and transferred to ICU.  Was placed on isolation for head lice. Continued supplemental O2 and was able to wean off BIPAP. Subjective: Following for AECOPD, ongoing tobacco abuse and acute respiratory failure.  Now transferred to the floor  sats are 95% on 3 lpm NC    Review of Systems  Constitutional: negative for fevers, chills and sweats  Respiratory: positive for cough, wheezing or dyspnea on exertion  Cardiovascular: negative for chest pressure/discomfort    Current Facility-Administered Medications   Medication Dose Route Frequency    alcohol 62% (NOZIN) nasal  1 Ampule  1 Ampule Topical Q12H    guaiFENesin ER (MUCINEX) tablet 1,200 mg  1,200 mg Oral BID    methylPREDNISolone (PF) (SOLU-MEDROL) injection 60 mg  60 mg IntraVENous Q12H    nicotine (NICODERM CQ) 14 mg/24 hr patch 1 Patch  1 Patch TransDERmal Q24H    insulin lispro (HUMALOG) injection 10 Units  10 Units SubCUTAneous TIDAC    azithromycin (ZITHROMAX) tablet 500 mg  500 mg Oral Q24H    budesonide (PULMICORT) 500 mcg/2 ml nebulizer suspension  500 mcg Nebulization BID RT    albuterol-ipratropium (DUO-NEB) 2.5 MG-0.5 MG/3 ML  3 mL Nebulization Q6H RT    tuberculin injection 5 Units  5 Units IntraDERMal ONCE    albuterol-ipratropium (DUO-NEB) 2.5 MG-0.5 MG/3 ML  3 mL Nebulization Q6H PRN    sodium chloride (NS) flush 5-10 mL  5-10 mL IntraVENous Q8H    sodium chloride (NS) flush 5-10 mL  5-10 mL IntraVENous PRN    acetaminophen (TYLENOL) tablet 650 mg  650 mg Oral Q4H PRN    heparin (porcine) injection 5,000 Units  5,000 Units SubCUTAneous Q8H    aspirin delayed-release tablet 81 mg  81 mg Oral DAILY    clopidogrel (PLAVIX) tablet 75 mg  75 mg Oral DAILY    levothyroxine (SYNTHROID) tablet 100 mcg  100 mcg Oral ACB    loperamide (IMODIUM) capsule 2 mg  2 mg Oral Q4H PRN    losartan (COZAAR) tablet 25 mg  25 mg Oral DAILY    niacin ER (NIASPAN) tablet 500 mg  500 mg Oral QHS    nitroglycerin (NITROSTAT) tablet 0.4 mg  0.4 mg SubLINGual PRN    pantoprazole (PROTONIX) tablet 40 mg  40 mg Oral ACB&D    rosuvastatin (CRESTOR) tablet 10 mg  10 mg Oral QHS    insulin lispro (HUMALOG) injection   SubCUTAneous AC&HS    nystatin (MYCOSTATIN) 100,000 unit/gram powder   Topical BID    insulin glargine (LANTUS) injection 80 Units  80 Units SubCUTAneous QHS    ALPRAZolam (XANAX) tablet 1 mg  1 mg Oral TID PRN    HYDROcodone-acetaminophen (NORCO)  mg tablet 1 Tab  1 Tab Oral Q6H PRN         Objective:     Vitals:    04/17/18 2346 04/18/18 0143 04/18/18 0419 04/18/18 0439   BP:   117/65    Pulse:   (!) 59    Resp:   18    Temp:   97.6 °F (36.4 °C)    SpO2: 98% 97% 96% 95%   Weight:       Height:         Intake and Output:   04/16 1901 - 04/18 0700  In: 368 [P.O.:368]  Out: 3850 [Urine:3850]       Physical Exam:          Constitutional: the patient is obese  HEENT: Sclera clear, pupils equal, oral mucosa moist  Lungs: diminished bilateral bases on NC, nonproducitive cough  Cardiovascular: RRR without M,G,R  Abd/GI: soft and non-tender; with positive bowel sounds. Ext: warm without cyanosis. There is no lower leg edema.   Musculoskeletal: moves all four extremities with equal strength  Skin: no jaundice or rashes, no wounds   Neuro: no gross neuro deficits       Lines/Drains:IV  Nutrition:ADA    CHEST XRAY:       LAB  Recent Labs      04/16/18   0338   WBC  12.9*   HGB  12.4   HCT  38.9   PLT  235     Recent Labs 04/16/18   0338   NA  136   K  5.0   CL  97*   CO2  32   GLU  277*   BUN  20   CREA  1.11*   MG  2.0   PHOS  3.5     Recent Labs      04/17/18   0653  04/16/18   0815  04/15/18   1210   PH  7.39  7.30*  7.24*   PCO2  61*  69*  75*   PO2  90  88  73*   HCO3  35*  34*  31*       Assessment:     Patient Active Problem List   Diagnosis Code    DM (diabetes mellitus) w/o complication Z98.8    HTN (hypertension) - controlled, benign I10    COPD (chronic obstructive pulmonary disease) (HCC) J44.9    Hypothyroidism E03.9    ASCAD - of the native vessel I25.110    Hypotension I95.9    Esophageal reflux K21.9    Stable angina ([facility]) I20.8    Chronic total occlusion of coronary artery(414.2) I25.82    Cystocele [id number]    Female stress incontinence N39.3    Lipid - hyperlipidemia other unsp dyslipidemia E78.5    Syncope and collapse R55    Osteoarthritis M19.90    Dyslipidemia E78.5    Acute on chronic respiratory failure with hypoxia and hypercapnia ([facility]) J96.21, G79.92    Head lice infestation G04.2    Tobacco abuse Z72.0    Nocturnal hypoxemia G47.34    Fall W19. XXXA    Bradycardia R00.1    COPD exacerbation ([facility] J44.1    Morbid obesity ([facility] [address] E66.01       Plan     Hospital Problems  Date Reviewed: 4/18/2018          Codes Class Noted POA    Morbid obesity ([facility] (Chronic) ICD-10-CM: E66.01  ICD-9-CM: 278.01  4/17/2018 Yes        Bradycardia ICD-10-CM: R00.1  ICD-9-CM: 427.89  4/16/2018 Yes        COPD exacerbation ([facility] ICD-10-CM: J44.1  ICD-9-CM: 491.21  4/16/2018 Yes    With ongoing smoking    DM (diabetes mellitus) w/o complication (Chronic) CIB-05-PP: E11.9  ICD-9-CM: 250.00  4/15/2018 Yes        Hypothyroidism (Chronic) ICD-10-CM: E03.9  ICD-9-CM: 244.9  4/15/2018 Yes        ASCAD - of the native vessel (Chronic) ICD-10-CM: I25.110  ICD-9-CM: 414.01, 411.1  4/15/2018 Yes    Overview Signed 6/9/2016  1:55 PM by Alden Cox     OV: 12/21/15  No sob-occasional cp but 3 episodes of cp last week- 3ntg             Head lice infestation (Chronic) ICD-10-CM: B85.0  ICD-9-CM: 132.0  4/15/2018 Yes        Tobacco abuse (Chronic) ICD-10-CM: Z72.0  ICD-9-CM: 305.1  4/15/2018 Yes        Nocturnal hypoxemia (Chronic) ICD-10-CM: G47.34  ICD-9-CM: 327.24  4/15/2018 Yes    unchanged    Fall ICD-10-CM: [address] Seal  ICD-9-CM: E888.9  4/15/2018 Yes        * (Principal)Acute on chronic respiratory failure with hypoxia and hypercapnia (HCC) ICD-10-CM: J96.21, J96.22  ICD-9-CM: 518.84, 786.09, 799.02  4/14/2018 Yes    Weaning, now on 3 lpm          -- smoking cessation, on nicoderm  -- duoneb, pulmicort, mucinex  -- zithromax D 4  -- assess O2 needs  -- contact precautions for lice    Ronda Police, NP    More than 50% of time documented was spent in face-to-face contact with the patient and in the care of the patient on the floor/unit where the patient is located. The patient has been seen and examined by me personally, the chart,labs, and radiographic studies have been reviewed. Chest:  Extremities:  Switch to PO prednisone at 40 mg every day x 2 days and decrease by 10 mg every other day till over. Resume home COPD meds upon discharge  Follow up with us in the office in 2 weeks  Likely home in next 24h    I agree with the above assessment and plan.     Scotty Latham MD.

## 2018-04-18 NOTE — PROGRESS NOTES
Problem: Interdisciplinary Rounds  Goal: Interdisciplinary Rounds  Outcome: Progressing Towards Goal  Interdisciplinary team rounds were held 4/18/2018 with the following team members:Care Management, Nursing, Physical Therapy, Physician and Clinical Coordinator and the patient. Plan of care discussed. See clinical pathway and/or care plan for interventions and desired outcomes.

## 2018-04-18 NOTE — PROGRESS NOTES
Respirations are even and unlabored. No distress at this time. Bed is low, locked and call light within reach.

## 2018-04-18 NOTE — PROGRESS NOTES
Date of Outreach Update:  Jose Carlos Sales was seen and assessed. MEWS Score: 1 (04/17/18 1947)  Vitals:    04/17/18 1947 04/17/18 1952 04/17/18 2330 04/17/18 2346   BP: 110/52  111/59    Pulse: 69  68    Resp: 16  16    Temp: 98.3 °F (36.8 °C)  97.9 °F (36.6 °C)    SpO2: 96% 95% 97% 98%   Weight:       Height:             Pain Assessment  Pain Intensity 1: 0 (04/18/18 0130)  Pain Location 1: Rib cage  Pain Intervention(s) 1: Medication (see MAR)  Patient Stated Pain Goal: 0      Previous Outreach assessment has been reviewed. Patient currently sleeping now on BIPAP at 35% FiO2. Patient's nurse has no current concerns. Will continue to follow up per outreach protocol.     Signed By:   Kerwin Tabor    April 18, 2018 1:41 AM

## 2018-04-18 NOTE — PROGRESS NOTES
Problem: Mobility Impaired (Adult and Pediatric)  Goal: *Acute Goals and Plan of Care (Insert Text)    LTG:  (1.)Ms. Pickering will move from supine to sit and sit to supine , scoot up and down and roll side to side in bed with SUPERVISION within 7 treatment day(s). (2.)Ms. Pickering will transfer from bed to chair and chair to bed with SUPERVISION using the least restrictive device within 7 treatment day(s). (3.)Ms. Pickering will ambulate with SUPERVISION for 250+ feet with the least restrictive device within 7 treatment day(s). ________________________________________________________________________________________________      PHYSICAL THERAPY: Initial Assessment, Treatment Day: Day of Assessment, AM 4/18/2018  INPATIENT: Hospital Day: 5  Payor: CARE IMPROVEMENT PLUS [place] / Product Type: Managed Care Medicare /      NAME/AGE/GENDER: Bibi Turcios is a 75 y.o. female   PRIMARY DIAGNOSIS: Acute respiratory failure (HCC) Acute on chronic respiratory failure with hypoxia and hypercapnia (HCC) Acute on chronic respiratory failure with hypoxia and hypercapnia (HCC)        ICD-10: Treatment Diagnosis:    · Generalized Muscle Weakness (M62.81)  · Difficulty in walking, Not elsewhere classified (R26.2)   Precaution/Allergies:  Lisinopril and Oxycodone      ASSESSMENT:     Ms. Sushila Nevarez presents supine in bed at arrival, 96% on 2L. She reports she has all ADs at home, but does not use. She was able to go supin e- stand with SBA and ambulate with CGA 30ft no AD. Her main limitations is balance with ambulation. She does have lurch and when distracted momentum from [address] is a challenge to control. Her sat level maintained 91% on RA during session. Placed in chair and replaced 2L. Her impairments include decreased functional mobility, decreased balance, decreased strength, decreased safety awareness, increased risk for falls.  Pt would benefit from further PT while in house to address these impairments to help improve to prior level of independence. Anticipate home discharge after medical clearance. This section established at most recent assessment   PROBLEM LIST (Impairments causing functional limitations):  1. Decreased Strength  2. Decreased ADL/Functional Activities  3. Decreased Ambulation Ability/Technique  4. Decreased Balance  5. Increased Shortness of Breath   INTERVENTIONS PLANNED: (Benefits and precautions of physical therapy have been discussed with the patient.)  1. Balance Exercise  2. Bed Mobility  3. Gait Training  4. Neuromuscular Re-education/Strengthening  5. Range of Motion (ROM)  6. Therapeutic Activites  7. Therapeutic Exercise/Strengthening  8. Transfer Training  9. Group Therapy     TREATMENT PLAN: Frequency/Duration: 3 times a week for duration of hospital stay  Rehabilitation Potential For Stated Goals: Good     RECOMMENDED REHABILITATION/EQUIPMENT: (at time of discharge pending progress): Due to the probability of continued deficits (see above) this patient will not likely need continued skilled physical therapy after discharge. Equipment:    None at this time              HISTORY:   History of Present Injury/Illness (Reason for Referral):  75years old F with PMH of OA, IBS, CAD, DM, COPD presented to the hospital after having a fall at home. Patient stated she was trying to get up from her bed when her feet slipped and patient fell to the floor. Patient stated she hit the L side of her chest with a trash can. Patient denies chest pain, dizziness or any other symptoms before the fall. Patient stated for the last 4 days is having SOB and productive cough of yellowish sputum. Patient stated trying  inhalers and nebulizer at home with no improvement of the symptoms. Patient reported her grandson is having similar symptoms. Past Medical History/Comorbidities:   Ms. Live Hernandez  has a past medical history of Anxiety;  Arthritis; ASCAD - of the native vessel (2/27/2013); Asthma; CAD (coronary artery disease); Cancer ([address] Chronic pain; Claustrophobia; COPD; Current smoker; Degenerative joint disease; Depression; Diabetes ([address] Diverticulosis; DM (diabetes mellitus) w/o complication (9/68/7785); Dyslipidemia; Fatty liver; Fibromyalgia; GERD (gastroesophageal reflux disease); H/O echocardiogram (01/20/14); HTN (hypertension) - controlled, benign (2/27/2013); Hyperlipemia; Hypertension; Hypothyroidism; IBS (irritable bowel syndrome); Lipid - hyperlipidemia other unsp dyslipidemia (6/9/2016); Macular degeneration; Mass of kidney; Murmur; Myalgia and myositis, unspecified; Obesity; Osteoarthrosis, unspecified whether generalized or localized, unspecified site; Psychiatric disorder; PUD (peptic ulcer disease) (1980); S/P total knee arthroplasty (6/22/2016); Seizures ([address] (12/2011); Sleep apnea; Status post total knee replacement (2/27/2013); Syncope and collapse (6/9/2016); Tobacco use disorder (6/9/2016); Urinary, incontinence, stress female; and Vertigo. She also has no past medical history of Adverse effect of anesthesia; Aneurysm ([address] Arrhythmia; Autoimmune disease ([address] Chronic kidney disease; Coagulation defects; DEMENTIA; Difficult intubation; Endocarditis; Heart failure ([address] Ill-defined condition; Infectious disease; Malignant hyperthermia due to anesthesia; Nausea & vomiting; Pseudocholinesterase deficiency; Rheumatic fever; Sleep disorder; Stroke [facility]); Thromboembolus ([address] or Unspecified adverse effect of anesthesia.   Ms. David Downing  has a past surgical history that includes colonoscopy; hx knee arthroscopy (Right); hx carpal tunnel release (Right); hx ankle fracture tx (Right); hx appendectomy; hx knee replacement (Left); hx lap cholecystectomy; pr cardiac surg procedure unlist; hx breast lumpectomy (Bilateral); hx hysterectomy (1979); hx tubal ligation; and hx urological.  Social History/Living Environment:   Home Environment: Trailer/mobile home  # Steps to Enter: 6  One/Two Story Residence: One story  Living Alone: No  Support Systems: Family member(s)  Patient Expects to be Discharged to[de-identified] Trailer/mobile home  Current DME Used/Available at Home: Cane, straight, Walker, rolling  Prior Level of Function/Work/Activity:  Completely independent. No AD used, but has SPC and RW at home from previous surgeries. Drives. Number of Personal Factors/Comorbidities that affect the Plan of Care: 1-2: MODERATE COMPLEXITY   EXAMINATION:   Most Recent Physical Functioning:   Gross Assessment:  AROM: Within functional limits  Strength: Within functional limits  Coordination: Generally decreased, functional  Tone: Normal  Sensation: Intact               Posture:     Balance:  Sitting: Intact  Standing: Impaired  Standing - Static: Good  Standing - Dynamic : Fair Bed Mobility:  Rolling: Supervision  Supine to Sit: Supervision  Scooting: Supervision  Wheelchair Mobility:     Transfers:  Sit to Stand: Contact guard assistance  Stand to Sit: Contact guard assistance  Gait:     Stance: Time  Gait Abnormalities: Decreased step clearance;Shuffling gait  Distance (ft): 30 Feet (ft)  Assistive Device:  (none)  Ambulation - Level of Assistance: Contact guard assistance      Body Structures Involved:  1. Lungs  2. Bones  3. Joints  4. Muscles  5. Ligaments Body Functions Affected:  1. Sensory/Pain  2. Respiratory  3. Neuromusculoskeletal  4. Movement Related Activities and Participation Affected:  1. General Tasks and Demands  2. Mobility  3. Self Care  4. Domestic Life  5. Community, Social and [place]   Number of elements that affect the Plan of Care: 3: MODERATE COMPLEXITY   CLINICAL PRESENTATION:   Presentation: Evolving clinical presentation with changing clinical characteristics: MODERATE COMPLEXITY   CLINICAL DECISION MAKIN [facility] Inpatient Short Form  How much difficulty does the patient currently have. .. Unable A Lot A Little None   1. Turning over in bed (including adjusting bedclothes, sheets and blankets)? [] 1   [] 2   [] 3   [x] 4   2. Sitting down on and standing up from a chair with arms ( e.g., wheelchair, bedside commode, etc.)   [] 1   [] 2   [] 3   [x] 4   3. Moving from lying on back to sitting on the side of the bed? [] 1   [] 2   [] 3   [x] 4   How much help from another person does the patient currently need. .. Total A Lot A Little None   4. Moving to and from a bed to a chair (including a wheelchair)? [] 1   [] 2   [] 3   [x] 4   5. Need to walk in hospital room? [] 1   [] 2   [x] 3   [] 4   6. Climbing 3-5 steps with a railing? [] 1   [] 2   [x] 3   [] 4   © 2007, Trustees of [address] [place] [address], under license to [employer]. All rights reserved      Score:  Initial: 22 Most Recent: X (Date: -- )    Interpretation of Tool:  Represents activities that are increasingly more difficult (i.e. Bed mobility, Transfers, Gait). Score 24 23 22-20 19-15 14-10 9-7 6     Modifier CH CI CJ CK CL CM CN      ? Mobility - Walking and Moving Around:     - CURRENT STATUS: CJ - 20%-39% impaired, limited or restricted    - GOAL STATUS: CJ - 20%-39% impaired, limited or restricted    - D/C STATUS:  ---------------To be determined---------------  Payor: CARE IMPROVEMENT PLUS [place] / Product Type: Managed Care Medicare /      Medical Necessity:     · Skilled intervention continues to be required due to decreased function. Reason for Services/Other Comments:  · Patient continues to require skilled intervention due to medical complications and patient unable to attend/participate in therapy as expected.    Use of outcome tool(s) and clinical judgement create a POC that gives a: Questionable prediction of patient's progress: MODERATE COMPLEXITY            TREATMENT:   (In addition to Assessment/Re-Assessment sessions the following treatments were rendered)   Pre-treatment Symptoms/Complaints: none  Pain: Initial:   Pain Intensity 1: 0  Post Session:  0     Therapeutic Activity: (    8min): Therapeutic activities including Bed transfers, Chair transfers and Ambulation on level ground to improve mobility, strength, balance and coordination. Required moderate   to promote static and dynamic balance in standing, promote coordination of bilateral, lower extremity(s) and promote motor control of bilateral, lower extremity(s). Braces/Orthotics/Lines/Etc:   · IV  · O2 Device: Nasal cannula  Treatment/Session Assessment:    · Response to Treatment:  See above  · Interdisciplinary Collaboration:   o Physical Therapist  o Registered Nurse  · After treatment position/precautions:   o Up in chair  o Call light within reach  o RN notified   · Compliance with Program/Exercises: Will assess as treatment progresses. · Recommendations/Intent for next treatment session: \"Next visit will focus on advancements to more challenging activities and reduction in assistance provided\".   Total Treatment Duration:  PT Patient Time In/Time Out  Time In: 0930  Time Out: NAIDA Briscoe

## 2018-04-18 NOTE — PROGRESS NOTES
Hospitalist Progress Note    Subjective:   Daily Progress Note: 4/18/2018 4:11 PM    Ms. Dorie Lewis is a 76 yo WF, with a pmh of chronic respiratory failure due to 3 liter dependent COPD, who presented 4/15 for cough and sob and was found to be in acute on chronic respiratory failure due to an acute exacerbation of her copd. She required transfer to ICU for several days of bipap therapy but now is back on her home nasal cannula. Sawyer removed today and she is urinating independently. Solumedrol changed to oral prednisone today. Had head lice which were treated. Denies current chest pain, sob.      Current Facility-Administered Medications   Medication Dose Route Frequency    [START ON 4/19/2018] predniSONE (DELTASONE) tablet 40 mg  40 mg Oral DAILY WITH BREAKFAST    Followed by   Yoana Burgos ON 4/21/2018] predniSONE (DELTASONE) tablet 30 mg  30 mg Oral DAILY WITH BREAKFAST    Followed by   Yoana Burgos ON 4/23/2018] predniSONE (DELTASONE) tablet 20 mg  20 mg Oral DAILY WITH BREAKFAST    Followed by   Yoana Burgos ON 4/25/2018] predniSONE (DELTASONE) tablet 10 mg  10 mg Oral DAILY WITH BREAKFAST    alcohol 62% (NOZIN) nasal  1 Ampule  1 Ampule Topical Q12H    guaiFENesin ER (MUCINEX) tablet 1,200 mg  1,200 mg Oral BID    nicotine (NICODERM CQ) 14 mg/24 hr patch 1 Patch  1 Patch TransDERmal Q24H    insulin lispro (HUMALOG) injection 10 Units  10 Units SubCUTAneous TIDAC    azithromycin (ZITHROMAX) tablet 500 mg  500 mg Oral Q24H    budesonide (PULMICORT) 500 mcg/2 ml nebulizer suspension  500 mcg Nebulization BID RT    albuterol-ipratropium (DUO-NEB) 2.5 MG-0.5 MG/3 ML  3 mL Nebulization Q6H RT    tuberculin injection 5 Units  5 Units IntraDERMal ONCE    albuterol-ipratropium (DUO-NEB) 2.5 MG-0.5 MG/3 ML  3 mL Nebulization Q6H PRN    sodium chloride (NS) flush 5-10 mL  5-10 mL IntraVENous Q8H    sodium chloride (NS) flush 5-10 mL  5-10 mL IntraVENous PRN    acetaminophen (TYLENOL) tablet 650 mg  650 mg Oral Q4H PRN    heparin (porcine) injection 5,000 Units  5,000 Units SubCUTAneous Q8H    aspirin delayed-release tablet 81 mg  81 mg Oral DAILY    clopidogrel (PLAVIX) tablet 75 mg  75 mg Oral DAILY    levothyroxine (SYNTHROID) tablet 100 mcg  100 mcg Oral ACB    loperamide (IMODIUM) capsule 2 mg  2 mg Oral Q4H PRN    losartan (COZAAR) tablet 25 mg  25 mg Oral DAILY    niacin ER (NIASPAN) tablet 500 mg  500 mg Oral QHS    nitroglycerin (NITROSTAT) tablet 0.4 mg  0.4 mg SubLINGual PRN    pantoprazole (PROTONIX) tablet 40 mg  40 mg Oral ACB&D    rosuvastatin (CRESTOR) tablet 10 mg  10 mg Oral QHS    insulin lispro (HUMALOG) injection   SubCUTAneous AC&HS    nystatin (MYCOSTATIN) 100,000 unit/gram powder   Topical BID    insulin glargine (LANTUS) injection 80 Units  80 Units SubCUTAneous QHS    ALPRAZolam (XANAX) tablet 1 mg  1 mg Oral TID PRN    HYDROcodone-acetaminophen (NORCO)  mg tablet 1 Tab  1 Tab Oral Q6H PRN        Review of Systems  A comprehensive review of systems was negative except for that written in the HPI.     Objective:     Visit Vitals    /89 (BP Patient Position: Sitting)    Pulse 66    Temp 98.8 °F (37.1 °C)    Resp 18    Ht 5' 6\" (1.676 m)    Wt 113.2 kg (249 lb 9 oz)    SpO2 95%    BMI 40.28 kg/m2    O2 Flow Rate (L/min): 3 l/min O2 Device: Nasal cannula    Temp (24hrs), Av °F (36.7 °C), Min:97.4 °F (36.3 °C), Max:98.8 °F (37.1 °C)          1901 -  0700  In: 368 [P.O.:368]  Out: 3850 [Urine:3850]    General: awake, alert, oriented, overweight  Eyes; non icteric, Eomi  Neck; supple  PULM: non labored, no accessory muscle use    Additional comments:I reviewed the patient's new clinical lab test results. j    Data Review    Recent Results (from the past 24 hour(s))   GLUCOSE, POC    Collection Time: 18  8:29 PM   Result Value Ref Range    Glucose (POC) 280 (H) 65 - 100 mg/dL   GLUCOSE, POC    Collection Time: 18  5:24 AM   Result Value Ref Range Glucose (POC) 164 (H) 65 - 100 mg/dL   CBC W/O DIFF    Collection Time: 04/18/18  7:32 AM   Result Value Ref Range    WBC 11.4 (H) 4.3 - 11.1 K/uL    RBC 4.21 4.05 - 5.25 M/uL    HGB 12.2 11.7 - 15.4 g/dL    HCT 38.8 35.8 - 46.3 %    MCV 92.2 79.6 - 97.8 FL    MCH 29.0 26.1 - 32.9 PG    MCHC 31.4 31.4 - 35.0 g/dL    RDW 13.0 11.9 - 14.6 %    PLATELET 727 441 - 827 K/uL    MPV 11.2 10.8 - 59.0 FL   METABOLIC PANEL, BASIC    Collection Time: 04/18/18  7:32 AM   Result Value Ref Range    Sodium 143 136 - 145 mmol/L    Potassium 4.5 3.5 - 5.1 mmol/L    Chloride 100 98 - 107 mmol/L    CO2 35 (H) 21 - 32 mmol/L    Anion gap 8 7 - 16 mmol/L    Glucose 145 (H) 65 - 100 mg/dL    BUN 24 (H) 8 - 23 MG/DL    Creatinine 0.78 0.6 - 1.0 MG/DL    GFR est AA >60 >60 ml/min/1.73m2    GFR est non-AA >60 >60 ml/min/1.73m2    Calcium 9.2 8.3 - 10.4 MG/DL   GLUCOSE, POC    Collection Time: 04/18/18 11:48 AM   Result Value Ref Range    Glucose (POC) 163 (H) 65 - 100 mg/dL   GLUCOSE, POC    Collection Time: 04/18/18  3:57 PM   Result Value Ref Range    Glucose (POC) 284 (H) 65 - 100 mg/dL         Assessment/Plan:     Principal Problem:    Acute on chronic respiratory failure with hypoxia and hypercapnia (HCC) (4/14/2018)    Active Problems:    DM (diabetes mellitus) w/o complication (0/16/4615)      Hypothyroidism (4/15/2018)      ASCAD - of the native vessel (4/15/2018)      Overview: OV: 12/21/15      No sob-occasional cp but 3 episodes of cp last week- 3ntg      Head lice infestation (9/39/2680)      Tobacco abuse (4/15/2018)      Nocturnal hypoxemia (4/15/2018)      Fall (4/15/2018)      Bradycardia (4/16/2018)      COPD exacerbation (HCC) (4/16/2018)      Morbid obesity ([address] (4/17/2018)    urinating on her own. Compliance with cpap qhs encouraged. Now on oral prednisone taper. Watch blood sugars. Home tomorrow?     Care Plan discussed with: Patient/Family and Nurse      Signed By: Rochelle Dixon MD     April 18, 2018

## 2018-04-19 ENCOUNTER — HOME HEALTH ADMISSION (OUTPATIENT)
Dept: HOME HEALTH SERVICES | Facility: HOME HEALTH | Age: 68
End: 2018-04-19
Payer: MEDICARE

## 2018-04-19 VITALS
SYSTOLIC BLOOD PRESSURE: 104 MMHG | WEIGHT: 249.56 LBS | TEMPERATURE: 98.6 F | OXYGEN SATURATION: 97 % | HEART RATE: 66 BPM | RESPIRATION RATE: 20 BRPM | BODY MASS INDEX: 40.11 KG/M2 | DIASTOLIC BLOOD PRESSURE: 49 MMHG | HEIGHT: 66 IN

## 2018-04-19 LAB
GLUCOSE BLD STRIP.AUTO-MCNC: 107 MG/DL (ref 65–100)
GLUCOSE BLD STRIP.AUTO-MCNC: 109 MG/DL (ref 65–100)
GLUCOSE BLD STRIP.AUTO-MCNC: 58 MG/DL (ref 65–100)

## 2018-04-19 PROCEDURE — 74011000250 HC RX REV CODE- 250: Performed by: INTERNAL MEDICINE

## 2018-04-19 PROCEDURE — 94760 N-INVAS EAR/PLS OXIMETRY 1: CPT

## 2018-04-19 PROCEDURE — 74011250637 HC RX REV CODE- 250/637: Performed by: NURSE PRACTITIONER

## 2018-04-19 PROCEDURE — 74011250637 HC RX REV CODE- 250/637: Performed by: INTERNAL MEDICINE

## 2018-04-19 PROCEDURE — 74011250636 HC RX REV CODE- 250/636: Performed by: INTERNAL MEDICINE

## 2018-04-19 PROCEDURE — 82962 GLUCOSE BLOOD TEST: CPT

## 2018-04-19 PROCEDURE — 77010033678 HC OXYGEN DAILY

## 2018-04-19 PROCEDURE — 99232 SBSQ HOSP IP/OBS MODERATE 35: CPT | Performed by: INTERNAL MEDICINE

## 2018-04-19 PROCEDURE — 74011636637 HC RX REV CODE- 636/637: Performed by: INTERNAL MEDICINE

## 2018-04-19 PROCEDURE — 94640 AIRWAY INHALATION TREATMENT: CPT

## 2018-04-19 RX ORDER — FLUTICASONE PROPIONATE AND SALMETEROL 250; 50 UG/1; UG/1
1 POWDER RESPIRATORY (INHALATION) 2 TIMES DAILY
Qty: 1 INHALER | Refills: 1 | Status: SHIPPED | OUTPATIENT
Start: 2018-04-19

## 2018-04-19 RX ORDER — AZITHROMYCIN 250 MG/1
250 TABLET, FILM COATED ORAL DAILY
Qty: 4 TAB | Refills: 0 | Status: SHIPPED | OUTPATIENT
Start: 2018-04-19 | End: 2018-04-23

## 2018-04-19 RX ORDER — PREDNISONE 20 MG/1
TABLET ORAL
Qty: 12 TAB | Refills: 0 | Status: ON HOLD | OUTPATIENT
Start: 2018-04-19 | End: 2019-02-08 | Stop reason: SDUPTHER

## 2018-04-19 RX ORDER — ALPRAZOLAM 2 MG/1
1 TABLET ORAL
Qty: 1 TAB | Refills: 0 | Status: SHIPPED
Start: 2018-04-19 | End: 2020-11-19 | Stop reason: ALTCHOICE

## 2018-04-19 RX ORDER — IPRATROPIUM BROMIDE AND ALBUTEROL SULFATE 2.5; .5 MG/3ML; MG/3ML
3 SOLUTION RESPIRATORY (INHALATION)
Qty: 30 NEBULE | Refills: 0 | Status: SHIPPED | OUTPATIENT
Start: 2018-04-19

## 2018-04-19 RX ADMIN — BUDESONIDE 500 MCG: 0.5 INHALANT RESPIRATORY (INHALATION) at 07:25

## 2018-04-19 RX ADMIN — HEPARIN SODIUM 5000 UNITS: 5000 INJECTION, SOLUTION INTRAVENOUS; SUBCUTANEOUS at 06:08

## 2018-04-19 RX ADMIN — NYSTATIN: 100000 POWDER TOPICAL at 09:00

## 2018-04-19 RX ADMIN — IPRATROPIUM BROMIDE AND ALBUTEROL SULFATE 3 ML: .5; 3 SOLUTION RESPIRATORY (INHALATION) at 07:25

## 2018-04-19 RX ADMIN — PREDNISONE 40 MG: 20 TABLET ORAL at 08:44

## 2018-04-19 RX ADMIN — LOSARTAN POTASSIUM 25 MG: 50 TABLET ORAL at 08:45

## 2018-04-19 RX ADMIN — LEVOTHYROXINE SODIUM 100 MCG: 100 TABLET ORAL at 06:06

## 2018-04-19 RX ADMIN — PANTOPRAZOLE SODIUM 40 MG: 40 TABLET, DELAYED RELEASE ORAL at 06:06

## 2018-04-19 RX ADMIN — Medication 10 ML: at 06:07

## 2018-04-19 RX ADMIN — ASPIRIN 81 MG: 81 TABLET, COATED ORAL at 08:45

## 2018-04-19 RX ADMIN — Medication 1 AMPULE: at 08:45

## 2018-04-19 RX ADMIN — GUAIFENESIN 1200 MG: 600 TABLET, EXTENDED RELEASE ORAL at 08:45

## 2018-04-19 RX ADMIN — CLOPIDOGREL BISULFATE 75 MG: 75 TABLET ORAL at 08:45

## 2018-04-19 NOTE — PROGRESS NOTES
Assessment completed. Respirations are even and unlabored. Patient denies any pain No distress at this time. Bed is low, locked and call light within reach.

## 2018-04-19 NOTE — PROGRESS NOTES
Met with pt at bedside, pt ready for DC home today, agreeable to home with home care with [facility] for NSG and PT. Pt states she lives with son and his spouse with their 3 children and is never left at home alone, prior to admission she was independent with ADLs, can drive. Pt also needing home O2, orders faxed to [facility] for Tank delivery. Pt aware will need to wait on delivery for DC home.

## 2018-04-19 NOTE — PROGRESS NOTES
Date of Outreach Update:  Emiliano Riley was seen and assessed. MEWS Score: 1 (04/19/18 0344)  Vitals:    04/18/18 1925 04/18/18 2046 04/18/18 2206 04/19/18 0344   BP: 106/54  140/67 136/68   Pulse: 66  80 82   Resp: 18  20 20   Temp: 97.8 °F (36.6 °C)  98.7 °F (37.1 °C) 98.6 °F (37 °C)   SpO2: 94% 94% 94% 93%   Weight:       Height:             Pain Assessment  Pain Intensity 1: 0 (04/19/18 0130)  Pain Location 1: Rib cage  Pain Intervention(s) 1: Medication (see MAR)  Patient Stated Pain Goal: 0      Previous Outreach assessment has been reviewed. There have been no significant clinical changes since the completion of the last dated Outreach assessment. Will continue to follow up per outreach protocol.     Signed By:   Sussy Whatley    April 19, 2018 5:55 AM

## 2018-04-19 NOTE — DISCHARGE INSTRUCTIONS
DISCHARGE SUMMARY from Nurse    PATIENT INSTRUCTIONS:    After general anesthesia or intravenous sedation, for 24 hours or while taking prescription Narcotics:  · Limit your activities  · Do not drive and operate hazardous machinery  · Do not make important personal or business decisions  · Do  not drink alcoholic beverages  · If you have not urinated within 8 hours after discharge, please contact your surgeon on call. Report the following to your surgeon:  · Excessive pain, swelling, redness or odor of or around the surgical area  · Temperature over 100.5  · Nausea and vomiting lasting longer than 4 hours or if unable to take medications  · Any signs of decreased circulation or nerve impairment to extremity: change in color, persistent  numbness, tingling, coldness or increase pain  · Any questions    What to do at Home:  Recommended activity: Activity as tolerated. If you experience any of the following symptoms temp > 101.5, worsening cough or wheezing, shortness of breath or fatigue not relieved with rest, please follow up with MD.    *  Please give a list of your current medications to your Primary Care Provider. *  Please update this list whenever your medications are discontinued, doses are      changed, or new medications (including over-the-counter products) are added. *  Please carry medication information at all times in case of emergency situations. These are general instructions for a healthy lifestyle:    No smoking/ No tobacco products/ Avoid exposure to second hand smoke  Surgeon General's Warning:  Quitting smoking now greatly reduces serious risk to your health.     Obesity, smoking, and sedentary lifestyle greatly increases your risk for illness    A healthy diet, regular physical exercise & weight monitoring are important for maintaining a healthy lifestyle    You may be retaining fluid if you have a history of heart failure or if you experience any of the following symptoms: Weight gain of 3 pounds or more overnight or 5 pounds in a week, increased swelling in our hands or feet or shortness of breath while lying flat in bed. Please call your doctor as soon as you notice any of these symptoms; do not wait until your next office visit. Recognize signs and symptoms of STROKE:    F-face looks uneven    A-arms unable to move or move unevenly    S-speech slurred or non-existent    T-time-call 911 as soon as signs and symptoms begin-DO NOT go       Back to bed or wait to see if you get better-TIME IS BRAIN. Warning Signs of HEART ATTACK     Call 911 if you have these symptoms:   Chest discomfort. Most heart attacks involve discomfort in the center of the chest that lasts more than a few minutes, or that goes away and comes back. It can feel like uncomfortable pressure, squeezing, fullness, or pain.  Discomfort in other areas of the upper body. Symptoms can include pain or discomfort in one or both arms, the back, neck, jaw, or stomach.  Shortness of breath with or without chest discomfort.  Other signs may include breaking out in a cold sweat, nausea, or lightheadedness. Don't wait more than five minutes to call 911 - MINUTES MATTER! Fast action can save your life. Calling 911 is almost always the fastest way to get lifesaving treatment. Emergency Medical Services staff can begin treatment when they arrive -- up to an hour sooner than if someone gets to the hospital by car. The discharge information has been reviewed with the patient. The patient verbalized understanding. Discharge medications reviewed with the patient and appropriate educational materials and side effects teaching were provided. Chronic Obstructive Pulmonary Disease (COPD): Care Instructions  Your Care Instructions    Chronic obstructive pulmonary disease (COPD) is a general term for a group of lung diseases, including emphysema and chronic bronchitis.  People with COPD have decreased airflow in and out of the lungs, which makes it hard to breathe. The airways also can get clogged with thick mucus. Cigarette smoking is a major cause of COPD. Although there is no cure for COPD, you can slow its progress. Following your treatment plan and taking care of yourself can help you feel better and live longer. Follow-up care is a key part of your treatment and safety. Be sure to make and go to all appointments, and call your doctor if you are having problems. It's also a good idea to know your test results and keep a list of the medicines you take. How can you care for yourself at home? ?Staying healthy  ? · Do not smoke. This is the most important step you can take to prevent more damage to your lungs. If you need help quitting, talk to your doctor about stop-smoking programs and medicines. These can increase your chances of quitting for good. ? · Avoid colds and flu. Get a pneumococcal vaccine shot. If you have had one before, ask your doctor whether you need a second dose. Get the flu vaccine every fall. If you must be around people with colds or the flu, wash your hands often. ? · Avoid secondhand smoke, air pollution, and high altitudes. Also avoid cold, dry air and hot, humid air. Stay at home with your windows closed when air pollution is bad. ?Medicines and oxygen therapy  ? · Take your medicines exactly as prescribed. Call your doctor if you think you are having a problem with your medicine. ? · You may be taking medicines such as:  ¨ Bronchodilators. These help open your airways and make breathing easier. Bronchodilators are either short-acting (work for 6 to 9 hours) or long-acting (work for 24 hours). You inhale most bronchodilators, so they start to act quickly. Always carry your quick-relief inhaler with you in case you need it while you are away from home. ¨ Corticosteroids (prednisone, budesonide). These reduce airway inflammation. They come in pill or inhaled form.  You must take these medicines every day for them to work well. ? · A spacer may help you get more inhaled medicine to your lungs. Ask your doctor or pharmacist if a spacer is right for you. If it is, ask how to use it properly. ? · Do not take any vitamins, over-the-counter medicine, or herbal products without talking to your doctor first.   ? · If your doctor prescribed antibiotics, take them as directed. Do not stop taking them just because you feel better. You need to take the full course of antibiotics. ? · Oxygen therapy boosts the amount of oxygen in your blood and helps you breathe easier. Use the flow rate your doctor has recommended, and do not change it without talking to your doctor first.   Activity  ? · Get regular exercise. Walking is an easy way to get exercise. Start out slowly, and walk a little more each day. ? · Pay attention to your breathing. You are exercising too hard if you cannot talk while you are exercising. ? · Take short rest breaks when doing household chores and other activities. ? · Learn breathing methods-such as breathing through pursed lips-to help you become less short of breath. ? · If your doctor has not set you up with a pulmonary rehabilitation program, talk to him or her about whether rehab is right for you. Rehab includes exercise programs, education about your disease and how to manage it, help with diet and other changes, and emotional support. Diet  ? · Eat regular, healthy meals. Use bronchodilators about 1 hour before you eat to make it easier to eat. Eat several small meals instead of three large ones. Drink beverages at the end of the meal. Avoid foods that are hard to chew. ? · Eat foods that contain protein so that you do not lose muscle mass. ? · Talk with your doctor if you gain too much weight or if you lose weight without trying. ?Mental health  ? · Talk to your family, friends, or a therapist about your feelings.  It is normal to feel frightened, angry, hopeless, helpless, and even guilty. Talking openly about bad feelings can help you cope. If these feelings last, talk to your doctor. When should you call for help? Call 911 anytime you think you may need emergency care. For example, call if:  ? · You have severe trouble breathing. ?Call your doctor now or seek immediate medical care if:  ? · You have new or worse trouble breathing. ? · You cough up blood. ? · You have a fever. ? Watch closely for changes in your health, and be sure to contact your doctor if:  ? · You cough more deeply or more often, especially if you notice more mucus or a change in the color of your mucus. ? · You have new or worse swelling in your legs or belly. ? · You are not getting better as expected. Where can you learn more? Go to http://anibal-lamont.info/. Keith Snider in the search box to learn more about \"Chronic Obstructive Pulmonary Disease (COPD): Care Instructions. \"  Current as of: May 12, 2017  Content Version: 11.4  © 5167-3261 [employer]. Care instructions adapted under license by [employer] (which disclaims liability or warranty for this information). If you have questions about a medical condition or this instruction, always ask your healthcare professional. [address] any warranty or liability for your use of this information. Chronic Obstructive Pulmonary Disease (COPD) Flare-Ups: Care Instructions  Your Care Instructions    Chronic obstructive pulmonary disease (COPD) is a lung disease that makes it hard to breathe. It is caused by damage to the lungs over many years, usually from smoking. COPD is often a mix of two diseases:  · Chronic bronchitis: The airways that carry air to the lungs (bronchial tubes) get inflamed and make a lot of mucus. This can narrow or block the airways. · Emphysema: In a healthy person, the tiny air sacs in the lungs are like balloons.  As you breathe in and out, they get bigger and smaller to move air through your lungs. But with emphysema, these air sacs are damaged and lose their stretch. Less air gets in and out of the lungs. Many people with COPD have attacks called flare-ups or exacerbations. This is when your usual symptoms quickly get worse and stay worse. The doctor has checked you carefully. But problems can develop later. If you notice any problems or new symptoms, get medical treatment right away. Follow-up care is a key part of your treatment and safety. Be sure to make and go to all appointments, and call your doctor if you are having problems. It's also a good idea to know your test results and keep a list of the medicines you take. How can you care for yourself at home? · Be safe with medicines. Take your medicines exactly as prescribed. Call your doctor if you think you are having a problem with your medicine. You may be taking medicines such as:  ¨ Bronchodilators. These help open your airways and make breathing easier. ¨ Corticosteroids. These reduce airway inflammation. They may be given as pills, in a vein, or in an inhaled form. You may go home with pills in addition to an inhaler that you already use. · A spacer may help you get more inhaled medicine to your lungs. Ask your doctor or pharmacist if a spacer is right for you. If it is, ask how to use it properly. · If your doctor prescribed antibiotics, take them as directed. Do not stop taking them just because you feel better. You need to take the full course of antibiotics. · If your doctor prescribed oxygen, use the flow rate your doctor has recommended. Do not change it without talking to your doctor first.  · Do not smoke. Smoking makes COPD worse. If you need help quitting, talk to your doctor about stop-smoking programs and medicines. These can increase your chances of quitting for good. When should you call for help? Call 911 anytime you think you may need emergency care.  For example, call if:  ? · You have severe trouble breathing. ?Call your doctor now or seek immediate medical care if:  ? · You have new or worse trouble breathing. ? · Your coughing or wheezing gets worse. ? · You cough up dark brown or bloody mucus (sputum). ? · You have a new or higher fever. ? Watch closely for changes in your health, and be sure to contact your doctor if:  ? · You notice more mucus or a change in the color of your mucus. ? · You need to use your antibiotic or steroid pills. ? · You do not get better as expected. Where can you learn more? Go to http://anibal-lamont.info/. Enter X121 in the search box to learn more about \"Chronic Obstructive Pulmonary Disease (COPD) Flare-Ups: Care Instructions. \"  Current as of: May 12, 2017  Content Version: 11.4  © 9226-0676 [employer]. Care instructions adapted under license by [employer] (which disclaims liability or warranty for this information). If you have questions about a medical condition or this instruction, always ask your healthcare professional. [address] any warranty or liability for your use of this information.     ___________________________________________________________________________________________________________________________________

## 2018-04-19 NOTE — PROGRESS NOTES
Date of Outreach Update:  Bharath Sage was seen and assessed. MEWS Score: 1 (04/18/18 1925)  Vitals:    04/18/18 1225 04/18/18 1623 04/18/18 1925 04/18/18 2046   BP: 148/89 149/67 106/54    Pulse: 66 (!) 115 66    Resp: 18 20 18    Temp: 98.8 °F (37.1 °C) 97.8 °F (36.6 °C) 97.8 °F (36.6 °C)    SpO2: 95% 95% 94% 94%   Weight:       Height:             Pain Assessment  Pain Intensity 1: 0 (04/18/18 2000)  Pain Location 1: Rib cage  Pain Intervention(s) 1: Medication (see MAR)  Patient Stated Pain Goal: 0      Previous Outreach assessment has been reviewed. Patient wearing 2 L NC with O2 sat of 93% and HR 66. Patient's lungs with scattered wheezing, patient currently getting Duo-Neb treatment. Patient denies any SOB. Patient has no concerns at this time. Will continue to follow up per outreach protocol.     Signed By:   Onel Simeon    April 18, 2018 9:34 PM

## 2018-04-19 NOTE — PROGRESS NOTES
Patient blood sugar is 58. Gave patient two container of OJ and reassessed patient. Blood sugar is now 109. Will monitor.

## 2018-04-19 NOTE — PROGRESS NOTES
Rosario Pickering  Admission Date: 4/14/2018             Daily Progress Note: 4/19/2018    The patient's chart is reviewed and the patient is discussed with the staff. 75 y.o. CF evaluated at the request of Dr. Wendi Monique. Has history of recurrent/persistent lice, DM, hypothyroidism, GERD, IBS, HTN, HL, CAD, JEREMÍAS on O2 at 3 lpm with sleep (does not wear CPAP), COPD with rescue inhaler only, and tobacco abuse (~50 pack year history).    In ER was hypoxic requiring 5L O2 to maintain sats in the 90s.  Developed increased confusion and worsening hypoxia and RR was called.  Was placed on BIPAP and transferred to ICU.  Was placed on isolation for head lice.  Continued supplemental O2 and was able to wean off BIPAP. Subjective:     On 2L NC, O2 sat 97%, she wears 3LNC at night only at home.    Wanting to go home    Current Facility-Administered Medications   Medication Dose Route Frequency    predniSONE (DELTASONE) tablet 40 mg  40 mg Oral DAILY WITH BREAKFAST    Followed by   Navi Breeding ON 4/21/2018] predniSONE (DELTASONE) tablet 30 mg  30 mg Oral DAILY WITH BREAKFAST    Followed by   Navi Breeding ON 4/23/2018] predniSONE (DELTASONE) tablet 20 mg  20 mg Oral DAILY WITH BREAKFAST    Followed by   Navi Breeding ON 4/25/2018] predniSONE (DELTASONE) tablet 10 mg  10 mg Oral DAILY WITH BREAKFAST    alcohol 62% (NOZIN) nasal  1 Ampule  1 Ampule Topical Q12H    guaiFENesin ER (MUCINEX) tablet 1,200 mg  1,200 mg Oral BID    nicotine (NICODERM CQ) 14 mg/24 hr patch 1 Patch  1 Patch TransDERmal Q24H    insulin lispro (HUMALOG) injection 10 Units  10 Units SubCUTAneous TIDAC    azithromycin (ZITHROMAX) tablet 500 mg  500 mg Oral Q24H    budesonide (PULMICORT) 500 mcg/2 ml nebulizer suspension  500 mcg Nebulization BID RT    albuterol-ipratropium (DUO-NEB) 2.5 MG-0.5 MG/3 ML  3 mL Nebulization Q6H RT    albuterol-ipratropium (DUO-NEB) 2.5 MG-0.5 MG/3 ML  3 mL Nebulization Q6H PRN    sodium chloride (NS) flush 5-10 mL  5-10 mL IntraVENous Q8H    sodium chloride (NS) flush 5-10 mL  5-10 mL IntraVENous PRN    acetaminophen (TYLENOL) tablet 650 mg  650 mg Oral Q4H PRN    heparin (porcine) injection 5,000 Units  5,000 Units SubCUTAneous Q8H    aspirin delayed-release tablet 81 mg  81 mg Oral DAILY    clopidogrel (PLAVIX) tablet 75 mg  75 mg Oral DAILY    levothyroxine (SYNTHROID) tablet 100 mcg  100 mcg Oral ACB    loperamide (IMODIUM) capsule 2 mg  2 mg Oral Q4H PRN    losartan (COZAAR) tablet 25 mg  25 mg Oral DAILY    niacin ER (NIASPAN) tablet 500 mg  500 mg Oral QHS    nitroglycerin (NITROSTAT) tablet 0.4 mg  0.4 mg SubLINGual PRN    pantoprazole (PROTONIX) tablet 40 mg  40 mg Oral ACB&D    rosuvastatin (CRESTOR) tablet 10 mg  10 mg Oral QHS    insulin lispro (HUMALOG) injection   SubCUTAneous AC&HS    nystatin (MYCOSTATIN) 100,000 unit/gram powder   Topical BID    insulin glargine (LANTUS) injection 80 Units  80 Units SubCUTAneous QHS    ALPRAZolam (XANAX) tablet 1 mg  1 mg Oral TID PRN    HYDROcodone-acetaminophen (NORCO)  mg tablet 1 Tab  1 Tab Oral Q6H PRN       Review of Systems  Constitutional: negative for fever, chills, sweats  Cardiovascular: negative for chest pain, palpitations, syncope, edema  Gastrointestinal:  negative for dysphagia, reflux, vomiting, diarrhea, abdominal pain, or melena  Neurologic:  negative for focal weakness, numbness, headache    Objective:     Vitals:    04/18/18 2206 04/19/18 0344 04/19/18 0725 04/19/18 0732   BP: 140/67 136/68  104/49   Pulse: 80 82  66   Resp: 20 20  20   Temp: 98.7 °F (37.1 °C) 98.6 °F (37 °C)  98.6 °F (37 °C)   SpO2: 94% 93% 94% 97%   Weight:       Height:         Intake and Output:   04/17 1901 - 04/19 0700  In: 640 [P.O.:640]  Out: 3400 [Urine:3400]       Physical Exam:   Constitution:  the patient is well developed and in no acute distress  EENMT:  Sclera clear, pupils equal, oral mucosa moist  Respiratory: scattered wheezes  Cardiovascular:  RRR without M,G,R  Gastrointestinal: soft and non-tender; with positive bowel sounds. Musculoskeletal: warm without cyanosis. There is no lower leg edema. Skin:  no jaundice or rashes, no open wounds   Neurologic: no gross neuro deficits     Psychiatric:  alert and oriented x 3    CHEST XRAY: 4/15/18        LAB  Recent Labs      04/19/18   0612  04/19/18   0523  04/18/18   2018  04/18/18   1557  04/18/18   1148   GLUCPOC  109*  58*  230*  284*  163*      Recent Labs      04/18/18   0732   WBC  11.4*   HGB  12.2   HCT  38.8   PLT  238     Recent Labs      04/18/18   0732   NA  143   K  4.5   CL  100   CO2  35*   GLU  145*   BUN  24*   CREA  0.78   CA  9.2     Recent Labs      04/17/18   0653   PH  7.39   PCO2  61*   PO2  90   HCO3  35*     No results for input(s): LCAD, LAC in the last 72 hours. Assessment:  (Medical Decision Making)     Patient Active Problem List   Diagnosis Code    DM (diabetes mellitus) w/o complication Q49.3    HTN (hypertension) - controlled, benign I10    COPD (chronic obstructive pulmonary disease) ([facility] [address] J44.9    Hypothyroidism E03.9    ASCAD - of the native vessel I25.110    Hypotension I95.9    Esophageal reflux K21.9    Stable angina (HCC) I20.8    Chronic total occlusion of coronary artery(414.2) I25.82    Cystocele [id number]    Female stress incontinence N39.3    Lipid - hyperlipidemia other unsp dyslipidemia E78.5    Syncope and collapse R55    Osteoarthritis M19.90    Dyslipidemia E78.5    Acute on chronic respiratory failure with hypoxia and hypercapnia (HCC) J96.21, J97.19    Head lice infestation M15.9    Tobacco abuse Z72.0    Nocturnal hypoxemia G47.34    Fall W19. XXXA    Bradycardia R00.1    COPD exacerbation ([address] J44.1    Morbid obesity ([address] E66.01         Plan:  (Medical Decision Making)     Hospital Problems  Date Reviewed: 4/19/2018          Codes Class Noted POA    Morbid obesity ([address] (Chronic) ICD-10-CM: E66.01  ICD-9-CM: 278.01  4/17/2018 Yes    chronic    Bradycardia ICD-10-CM: R00.1  ICD-9-CM: 427.89  4/16/2018 Yes    HR 66 this morning     COPD exacerbation (HCC) ICD-10-CM: J44.1  ICD-9-CM: 491.21  4/16/2018 Yes    Ongoing wheezes, continue BD, steroid taper  Zithromax Day 5   Resume home COPD meds at discharge     DM (diabetes mellitus) w/o complication (Chronic) QCB-44-CZ: E11.9  ICD-9-CM: 250.00  4/15/2018 Yes        Hypothyroidism (Chronic) ICD-10-CM: E03.9  ICD-9-CM: 244.9  4/15/2018 Yes        ASCAD - of the native vessel (Chronic) ICD-10-CM: I25.110  ICD-9-CM: 414.01, 411.1  4/15/2018 Yes     OV: 12/21/15  No sob-occasional cp but 3 episodes of cp last week- 3ntg             Head lice infestation (Chronic) ICD-10-CM: B85.0  ICD-9-CM: 132.0  4/15/2018 Yes    On precautions     Tobacco abuse (Chronic) ICD-10-CM: Z72.0  ICD-9-CM: 305.1  4/15/2018 Yes    Ongoing   nicoderm patch  Complete smoking cessation encouraged    Nocturnal hypoxemia (Chronic) ICD-10-CM: G40.19  ICD-9-CM: 327.24  4/15/2018 Yes    Wears 3L NC at home     Fall ICD-10-CM: [address] James Mention  ICD-9-CM: Z753.7  4/15/2018 Yes        * (Principal)Acute on chronic respiratory failure with hypoxia and hypercapnia (HCC) ICD-10-CM: J96.21, J96.22  ICD-9-CM: 518.84, 786.09, 799.02  4/14/2018 Yes    Weaned to 2L NC         --Follow up with [facility] in 2 weeks   --check ambulating O2 prior to discharge, consult to RT arrange for home O2 as needed, can go home from our stand point today or tomorrow. More than 50% of the time documented was spent in face-to-face contact with the patient and in the care of the patient on the floor/unit where the patient is located. John Mcdonald NP    The patient has been seen and examined by me personally, the chart,labs, and radiographic studies have been reviewed. Chest: CTA occasional end expiratory wheezing  Extremities: trace edema    I agree with the above assessment and plan.     Faustina Vázquez MD.

## 2018-04-19 NOTE — PROGRESS NOTES
[address]  Face to Face Encounter    Patients Name: Sheela Pickering    YOB: 1950    Ordering Physician: 4/19/18    Primary Diagnosis: Acute respiratory failure [facility])    Date of Face to Face:   4/19/2018                                  Face to Face Encounter findings are related to primary reason for home care:   yes. 1. I certify that the patient needs intermittent care as follows: skilled nursing care:  skilled observation/assessment, patient education  physical therapy: strengthening, stretching/ROM, transfer training, gait/stair training, balance training and pt/caregiver education    2. I certify that this patient is homebound, that is: 1) patient requires the use of a walker device, special transportation, or assistance of another to leave the home; or 2) patient's condition makes leaving the home medically contraindicated; and 3) patient has a normal inability to leave the home and leaving the home requires considerable and taxing effort. Patient may leave the home for infrequent and short duration for medical reasons, and occasional absences for non-medical reasons. Homebound status is due to the following functional limitations: Patient with strength deficits limiting the performance of all ADL's without caregiver assistance or the use of an assistive device. Patient with poor safety awareness and is at risk for falls without assistance of another person and the use of an assistive device. Patient with poor ambulation endurance limiting their safe ability to ascend/descend the required number of steps to leave the home. 3. I certify that this patient is under my care and that I, or a nurse practitioner or [facility], or clinical nurse specialist, or certified nurse midwife, working with me, had a Face-to-Face Encounter that meets the physician Face-to-Face Encounter requirements.   The following are the clinical findings from the [address] [place] [address] encounter that support the need for skilled services and is a summary of the encounter:     See hospital chart      Jesi Mccrary RN  4/19/2018      THE FOLLOWING TO BE COMPLETED BY THE COMMUNITY PHYSICIAN:    I concur with the findings described above from the F2F encounter that this patient is homebound and in need of a skilled service.     Certifying Physician: _____________________________________      Printed Certifying Physician Name: _____________________________________    Date: _________________

## 2018-04-19 NOTE — PROGRESS NOTES
Discharge instructions, follow up information, medication list, prescriptions, and medication side effects sheet provided and explained to the pt. IVs X2  removed from left hand and left thumb. Patient requiring oxygen at discharge. Patient instructed that a portable oxygen tank is to be delivered to patient's room before patient leave,  Patient verbalized understanding. Opportunity for questions provided. Instructed to call once ready to leave.

## 2018-04-21 ENCOUNTER — HOME CARE VISIT (OUTPATIENT)
Dept: SCHEDULING | Facility: HOME HEALTH | Age: 68
End: 2018-04-21
Payer: MEDICARE

## 2018-04-21 VITALS
RESPIRATION RATE: 15 BRPM | TEMPERATURE: 98 F | OXYGEN SATURATION: 93 % | SYSTOLIC BLOOD PRESSURE: 140 MMHG | HEART RATE: 78 BPM | DIASTOLIC BLOOD PRESSURE: 60 MMHG

## 2018-04-21 PROCEDURE — G0299 HHS/HOSPICE OF RN EA 15 MIN: HCPCS

## 2018-04-21 PROCEDURE — 400013 HH SOC

## 2018-04-24 ENCOUNTER — HOME CARE VISIT (OUTPATIENT)
Dept: SCHEDULING | Facility: HOME HEALTH | Age: 68
End: 2018-04-24
Payer: MEDICARE

## 2018-04-24 VITALS
HEART RATE: 66 BPM | DIASTOLIC BLOOD PRESSURE: 68 MMHG | OXYGEN SATURATION: 95 % | RESPIRATION RATE: 18 BRPM | TEMPERATURE: 97 F | SYSTOLIC BLOOD PRESSURE: 136 MMHG

## 2018-04-24 VITALS
TEMPERATURE: 97.3 F | HEART RATE: 63 BPM | SYSTOLIC BLOOD PRESSURE: 122 MMHG | RESPIRATION RATE: 18 BRPM | OXYGEN SATURATION: 96 % | DIASTOLIC BLOOD PRESSURE: 80 MMHG

## 2018-04-24 PROCEDURE — G0299 HHS/HOSPICE OF RN EA 15 MIN: HCPCS

## 2018-04-24 PROCEDURE — G0151 HHCP-SERV OF PT,EA 15 MIN: HCPCS

## 2018-04-25 ENCOUNTER — HOME CARE VISIT (OUTPATIENT)
Dept: HOME HEALTH SERVICES | Facility: HOME HEALTH | Age: 68
End: 2018-04-25
Payer: MEDICARE

## 2018-04-27 ENCOUNTER — HOME CARE VISIT (OUTPATIENT)
Dept: HOME HEALTH SERVICES | Facility: HOME HEALTH | Age: 68
End: 2018-04-27
Payer: MEDICARE

## 2018-04-30 ENCOUNTER — HOME CARE VISIT (OUTPATIENT)
Dept: SCHEDULING | Facility: HOME HEALTH | Age: 68
End: 2018-04-30
Payer: MEDICARE

## 2018-04-30 VITALS
RESPIRATION RATE: 18 BRPM | OXYGEN SATURATION: 94 % | SYSTOLIC BLOOD PRESSURE: 142 MMHG | HEART RATE: 68 BPM | TEMPERATURE: 98 F | DIASTOLIC BLOOD PRESSURE: 88 MMHG

## 2018-04-30 PROCEDURE — G0299 HHS/HOSPICE OF RN EA 15 MIN: HCPCS

## 2018-05-01 ENCOUNTER — HOME CARE VISIT (OUTPATIENT)
Dept: SCHEDULING | Facility: HOME HEALTH | Age: 68
End: 2018-05-01
Payer: MEDICARE

## 2018-05-01 PROCEDURE — G0155 HHCP-SVS OF CSW,EA 15 MIN: HCPCS

## 2018-05-04 ENCOUNTER — HOME CARE VISIT (OUTPATIENT)
Dept: SCHEDULING | Facility: HOME HEALTH | Age: 68
End: 2018-05-04
Payer: MEDICARE

## 2018-05-04 VITALS
TEMPERATURE: 98 F | SYSTOLIC BLOOD PRESSURE: 142 MMHG | DIASTOLIC BLOOD PRESSURE: 78 MMHG | OXYGEN SATURATION: 97 % | RESPIRATION RATE: 18 BRPM | HEART RATE: 78 BPM

## 2018-05-04 PROCEDURE — G0299 HHS/HOSPICE OF RN EA 15 MIN: HCPCS

## 2018-05-07 ENCOUNTER — HOME CARE VISIT (OUTPATIENT)
Dept: HOME HEALTH SERVICES | Facility: HOME HEALTH | Age: 68
End: 2018-05-07
Payer: MEDICARE

## 2018-05-08 ENCOUNTER — HOME CARE VISIT (OUTPATIENT)
Dept: SCHEDULING | Facility: HOME HEALTH | Age: 68
End: 2018-05-08
Payer: MEDICARE

## 2018-05-08 VITALS
OXYGEN SATURATION: 98 % | HEART RATE: 74 BPM | TEMPERATURE: 98 F | RESPIRATION RATE: 18 BRPM | SYSTOLIC BLOOD PRESSURE: 134 MMHG | DIASTOLIC BLOOD PRESSURE: 78 MMHG

## 2018-05-08 PROCEDURE — G0299 HHS/HOSPICE OF RN EA 15 MIN: HCPCS

## 2018-05-11 ENCOUNTER — HOME CARE VISIT (OUTPATIENT)
Dept: SCHEDULING | Facility: HOME HEALTH | Age: 68
End: 2018-05-11
Payer: MEDICARE

## 2018-05-11 VITALS
RESPIRATION RATE: 18 BRPM | HEART RATE: 62 BPM | SYSTOLIC BLOOD PRESSURE: 126 MMHG | OXYGEN SATURATION: 95 % | TEMPERATURE: 97.7 F | DIASTOLIC BLOOD PRESSURE: 64 MMHG

## 2018-05-11 PROCEDURE — G0299 HHS/HOSPICE OF RN EA 15 MIN: HCPCS

## 2018-05-13 ENCOUNTER — HOME CARE VISIT (OUTPATIENT)
Dept: HOME HEALTH SERVICES | Facility: HOME HEALTH | Age: 68
End: 2018-05-13
Payer: MEDICARE

## 2018-05-16 ENCOUNTER — HOME CARE VISIT (OUTPATIENT)
Dept: SCHEDULING | Facility: HOME HEALTH | Age: 68
End: 2018-05-16
Payer: MEDICARE

## 2018-05-16 VITALS
SYSTOLIC BLOOD PRESSURE: 124 MMHG | OXYGEN SATURATION: 94 % | TEMPERATURE: 98 F | RESPIRATION RATE: 18 BRPM | DIASTOLIC BLOOD PRESSURE: 72 MMHG | HEART RATE: 72 BPM

## 2018-05-16 PROCEDURE — G0299 HHS/HOSPICE OF RN EA 15 MIN: HCPCS

## 2018-05-23 ENCOUNTER — HOME CARE VISIT (OUTPATIENT)
Dept: SCHEDULING | Facility: HOME HEALTH | Age: 68
End: 2018-05-23
Payer: MEDICARE

## 2018-05-23 VITALS
DIASTOLIC BLOOD PRESSURE: 70 MMHG | SYSTOLIC BLOOD PRESSURE: 130 MMHG | RESPIRATION RATE: 18 BRPM | HEART RATE: 68 BPM | OXYGEN SATURATION: 98 % | TEMPERATURE: 97.8 F

## 2018-05-23 PROCEDURE — G0299 HHS/HOSPICE OF RN EA 15 MIN: HCPCS

## 2018-05-30 ENCOUNTER — HOME CARE VISIT (OUTPATIENT)
Dept: SCHEDULING | Facility: HOME HEALTH | Age: 68
End: 2018-05-30
Payer: MEDICARE

## 2018-05-30 VITALS
OXYGEN SATURATION: 98 % | TEMPERATURE: 97.8 F | DIASTOLIC BLOOD PRESSURE: 70 MMHG | HEART RATE: 68 BPM | SYSTOLIC BLOOD PRESSURE: 142 MMHG | RESPIRATION RATE: 18 BRPM

## 2018-05-30 PROCEDURE — G0299 HHS/HOSPICE OF RN EA 15 MIN: HCPCS

## 2018-06-06 ENCOUNTER — HOME CARE VISIT (OUTPATIENT)
Dept: SCHEDULING | Facility: HOME HEALTH | Age: 68
End: 2018-06-06
Payer: MEDICARE

## 2018-06-06 VITALS
TEMPERATURE: 98 F | RESPIRATION RATE: 18 BRPM | SYSTOLIC BLOOD PRESSURE: 142 MMHG | DIASTOLIC BLOOD PRESSURE: 78 MMHG | OXYGEN SATURATION: 98 % | HEART RATE: 74 BPM

## 2018-06-06 PROCEDURE — G0299 HHS/HOSPICE OF RN EA 15 MIN: HCPCS

## 2019-02-04 ENCOUNTER — HOSPITAL ENCOUNTER (INPATIENT)
Age: 69
LOS: 5 days | Discharge: HOME | DRG: 193 | End: 2019-02-09
Attending: EMERGENCY MEDICINE | Admitting: INTERNAL MEDICINE
Payer: MEDICARE

## 2019-02-04 ENCOUNTER — APPOINTMENT (OUTPATIENT)
Dept: GENERAL RADIOLOGY | Age: 69
DRG: 193 | End: 2019-02-04
Attending: EMERGENCY MEDICINE
Payer: MEDICARE

## 2019-02-04 DIAGNOSIS — B85.2 LICE: ICD-10-CM

## 2019-02-04 DIAGNOSIS — J44.1 COPD EXACERBATION (HCC): Primary | ICD-10-CM

## 2019-02-04 DIAGNOSIS — E13.8 DIABETES MELLITUS OF OTHER TYPE WITH COMPLICATION, UNSPECIFIED WHETHER LONG TERM INSULIN USE: Chronic | ICD-10-CM

## 2019-02-04 DIAGNOSIS — E87.1 HYPONATREMIA: ICD-10-CM

## 2019-02-04 DIAGNOSIS — J44.1 CHRONIC OBSTRUCTIVE PULMONARY DISEASE WITH ACUTE EXACERBATION (HCC): Chronic | ICD-10-CM

## 2019-02-04 DIAGNOSIS — G47.33 OSA (OBSTRUCTIVE SLEEP APNEA): ICD-10-CM

## 2019-02-04 DIAGNOSIS — J96.22 ACUTE ON CHRONIC RESPIRATORY FAILURE WITH HYPOXIA AND HYPERCAPNIA (HCC): ICD-10-CM

## 2019-02-04 DIAGNOSIS — J96.21 ACUTE ON CHRONIC RESPIRATORY FAILURE WITH HYPOXIA AND HYPERCAPNIA (HCC): ICD-10-CM

## 2019-02-04 DIAGNOSIS — Z72.0 TOBACCO ABUSE: Chronic | ICD-10-CM

## 2019-02-04 DIAGNOSIS — E66.01 MORBID OBESITY (HCC): Chronic | ICD-10-CM

## 2019-02-04 DIAGNOSIS — J09.X2 INFLUENZA A (H5N1): ICD-10-CM

## 2019-02-04 PROBLEM — J96.20 ACUTE ON CHRONIC RESPIRATORY FAILURE (HCC): Status: ACTIVE | Noted: 2019-02-04

## 2019-02-04 LAB
ADMINISTERED INITIALS, ADMINIT: NORMAL
ALBUMIN SERPL-MCNC: 3.3 G/DL (ref 3.2–4.6)
ALBUMIN/GLOB SERPL: 0.8 {RATIO} (ref 1.2–3.5)
ALP SERPL-CCNC: 94 U/L (ref 50–136)
ALT SERPL-CCNC: 27 U/L (ref 12–65)
ANION GAP SERPL CALC-SCNC: 5 MMOL/L (ref 7–16)
ANION GAP SERPL CALC-SCNC: 6 MMOL/L (ref 7–16)
APPEARANCE UR: ABNORMAL
ARTERIAL PATENCY WRIST A: ABNORMAL
ARTERIAL PATENCY WRIST A: ABNORMAL
AST SERPL-CCNC: 40 U/L (ref 15–37)
BACTERIA URNS QL MICRO: ABNORMAL /HPF
BASE DEFICIT BLD-SCNC: 3 MMOL/L
BASE DEFICIT BLD-SCNC: 3 MMOL/L
BASOPHILS # BLD: 0.1 K/UL (ref 0–0.2)
BASOPHILS NFR BLD: 1 % (ref 0–2)
BDY SITE: ABNORMAL
BDY SITE: ABNORMAL
BILIRUB SERPL-MCNC: 0.5 MG/DL (ref 0.2–1.1)
BILIRUB UR QL: NEGATIVE
BNP SERPL-MCNC: 40 PG/ML
BODY TEMPERATURE: 98.6
BODY TEMPERATURE: 98.6
BUN SERPL-MCNC: 11 MG/DL (ref 8–23)
BUN SERPL-MCNC: 18 MG/DL (ref 8–23)
CALCIUM SERPL-MCNC: 7.8 MG/DL (ref 8.3–10.4)
CALCIUM SERPL-MCNC: 8.4 MG/DL (ref 8.3–10.4)
CASTS URNS QL MICRO: 0 /LPF
CHLORIDE SERPL-SCNC: 95 MMOL/L (ref 98–107)
CHLORIDE SERPL-SCNC: 98 MMOL/L (ref 98–107)
CO2 BLD-SCNC: 27 MMOL/L
CO2 BLD-SCNC: 27 MMOL/L
CO2 SERPL-SCNC: 27 MMOL/L (ref 21–32)
CO2 SERPL-SCNC: 29 MMOL/L (ref 21–32)
COLLECT TIME,HTIME: 1353
COLLECT TIME,HTIME: 1840
COLOR UR: YELLOW
CREAT SERPL-MCNC: 0.99 MG/DL (ref 0.6–1)
CREAT SERPL-MCNC: 1.12 MG/DL (ref 0.6–1)
CRYSTALS URNS QL MICRO: 0 /LPF
D50 ADMINISTERED, D50ADM: 0 ML
D50 ORDER, D50ORD: 0 ML
DIFFERENTIAL METHOD BLD: ABNORMAL
EOSINOPHIL # BLD: 0 K/UL (ref 0–0.8)
EOSINOPHIL NFR BLD: 0 % (ref 0.5–7.8)
EPI CELLS #/AREA URNS HPF: ABNORMAL /HPF
ERYTHROCYTE [DISTWIDTH] IN BLOOD BY AUTOMATED COUNT: 12.5 % (ref 11.9–14.6)
EST. AVERAGE GLUCOSE BLD GHB EST-MCNC: 255 MG/DL
EXHALED MINUTE VOLUME, VE: 9.7 L/MIN
FLOW RATE ISTAT,IFRATE: 6 L/MIN
FLUAV AG NPH QL IA: POSITIVE
FLUBV AG NPH QL IA: NEGATIVE
GAS FLOW.O2 O2 DELIVERY SYS: ABNORMAL L/MIN
GAS FLOW.O2 O2 DELIVERY SYS: ABNORMAL L/MIN
GAS FLOW.O2 SETTING OXYMISER: 8 BPM
GLOBULIN SER CALC-MCNC: 4.4 G/DL (ref 2.3–3.5)
GLSCOM COMMENTS: NORMAL
GLUCOSE BLD STRIP.AUTO-MCNC: 507 MG/DL (ref 65–100)
GLUCOSE SERPL-MCNC: 373 MG/DL (ref 65–100)
GLUCOSE SERPL-MCNC: 512 MG/DL (ref 65–100)
GLUCOSE UR STRIP.AUTO-MCNC: >1000 MG/DL
GLUCOSE, GLC: 507 MG/DL
HBA1C MFR BLD: 10.5 % (ref 4.8–6)
HCO3 BLD-SCNC: 25.5 MMOL/L (ref 22–26)
HCO3 BLD-SCNC: 25.6 MMOL/L (ref 22–26)
HCT VFR BLD AUTO: 40.8 % (ref 35.8–46.3)
HGB BLD-MCNC: 13 G/DL (ref 11.7–15.4)
HGB UR QL STRIP: ABNORMAL
HIGH TARGET, HITG: 250 MG/DL
IMM GRANULOCYTES # BLD AUTO: 0.1 K/UL (ref 0–0.5)
IMM GRANULOCYTES NFR BLD AUTO: 1 % (ref 0–5)
INSPIRATION.DURATION SETTING TIME VENT: 0.9 SEC
INSULIN ADMINSTERED, INSADM: 8.9 UNITS/HOUR
INSULIN ORDER, INSORD: 8.9 UNITS/HOUR
KETONES UR QL STRIP.AUTO: NEGATIVE MG/DL
LACTATE BLD-SCNC: 0.93 MMOL/L (ref 0.5–1.9)
LEUKOCYTE ESTERASE UR QL STRIP.AUTO: ABNORMAL
LOW TARGET, LOT: 150 MG/DL
LYMPHOCYTES # BLD: 1.2 K/UL (ref 0.5–4.6)
LYMPHOCYTES NFR BLD: 15 % (ref 13–44)
MAGNESIUM SERPL-MCNC: 1.7 MG/DL (ref 1.8–2.4)
MCH RBC QN AUTO: 29 PG (ref 26.1–32.9)
MCHC RBC AUTO-ENTMCNC: 31.9 G/DL (ref 31.4–35)
MCV RBC AUTO: 91.1 FL (ref 79.6–97.8)
MINUTES UNTIL NEXT BG, NBG: 60 MIN
MONOCYTES # BLD: 0.5 K/UL (ref 0.1–1.3)
MONOCYTES NFR BLD: 7 % (ref 4–12)
MUCOUS THREADS URNS QL MICRO: 0 /LPF
MULTIPLIER, MUL: 0.02
NEUTS SEG # BLD: 6 K/UL (ref 1.7–8.2)
NEUTS SEG NFR BLD: 77 % (ref 43–78)
NITRITE UR QL STRIP.AUTO: POSITIVE
NRBC # BLD: 0 K/UL (ref 0–0.2)
O2/TOTAL GAS SETTING VFR VENT: 45 %
ORDER INITIALS, ORDINIT: NORMAL
PCO2 BLD: 58.8 MMHG (ref 35–45)
PCO2 BLD: 58.8 MMHG (ref 35–45)
PEEP RESPIRATORY: 8 CMH2O
PH BLD: 7.25 [PH] (ref 7.35–7.45)
PH BLD: 7.25 [PH] (ref 7.35–7.45)
PH UR STRIP: 5.5 [PH] (ref 5–9)
PHOSPHATE SERPL-MCNC: 3.3 MG/DL (ref 2.3–3.7)
PHOSPHATE SERPL-MCNC: 4.3 MG/DL (ref 2.3–3.7)
PIP ISTAT,IPIP: 16
PLATELET # BLD AUTO: 178 K/UL (ref 150–450)
PMV BLD AUTO: 11.3 FL (ref 9.4–12.3)
PO2 BLD: 73 MMHG (ref 75–100)
PO2 BLD: 96 MMHG (ref 75–100)
POTASSIUM SERPL-SCNC: 4.3 MMOL/L (ref 3.5–5.1)
POTASSIUM SERPL-SCNC: 7.1 MMOL/L (ref 3.5–5.1)
PROT SERPL-MCNC: 7.7 G/DL (ref 6.3–8.2)
PROT UR STRIP-MCNC: NEGATIVE MG/DL
RBC # BLD AUTO: 4.48 M/UL (ref 4.05–5.2)
RBC #/AREA URNS HPF: ABNORMAL /HPF
SAO2 % BLD: 91 % (ref 95–98)
SAO2 % BLD: 96 % (ref 95–98)
SERVICE CMNT-IMP: ABNORMAL
SODIUM SERPL-SCNC: 130 MMOL/L (ref 136–145)
SODIUM SERPL-SCNC: 130 MMOL/L (ref 136–145)
SP GR UR REFRACTOMETRY: 1.02 (ref 1–1.02)
SPECIMEN SOURCE: ABNORMAL
SPECIMEN TYPE: ABNORMAL
SPECIMEN TYPE: ABNORMAL
UROBILINOGEN UR QL STRIP.AUTO: 0.2 EU/DL (ref 0.2–1)
VT SETTING VENT: 508 ML
WBC # BLD AUTO: 7.8 K/UL (ref 4.3–11.1)
WBC URNS QL MICRO: ABNORMAL /HPF

## 2019-02-04 PROCEDURE — 65610000006 HC RM INTENSIVE CARE

## 2019-02-04 PROCEDURE — 96375 TX/PRO/DX INJ NEW DRUG ADDON: CPT | Performed by: EMERGENCY MEDICINE

## 2019-02-04 PROCEDURE — 83605 ASSAY OF LACTIC ACID: CPT

## 2019-02-04 PROCEDURE — 74011250636 HC RX REV CODE- 250/636: Performed by: INTERNAL MEDICINE

## 2019-02-04 PROCEDURE — 94644 CONT INHLJ TX 1ST HOUR: CPT

## 2019-02-04 PROCEDURE — 83735 ASSAY OF MAGNESIUM: CPT

## 2019-02-04 PROCEDURE — 51702 INSERT TEMP BLADDER CATH: CPT | Performed by: EMERGENCY MEDICINE

## 2019-02-04 PROCEDURE — 74011250637 HC RX REV CODE- 250/637: Performed by: NURSE PRACTITIONER

## 2019-02-04 PROCEDURE — 74011250636 HC RX REV CODE- 250/636: Performed by: EMERGENCY MEDICINE

## 2019-02-04 PROCEDURE — 99223 1ST HOSP IP/OBS HIGH 75: CPT | Performed by: INTERNAL MEDICINE

## 2019-02-04 PROCEDURE — 85025 COMPLETE CBC W/AUTO DIFF WBC: CPT

## 2019-02-04 PROCEDURE — 83036 HEMOGLOBIN GLYCOSYLATED A1C: CPT

## 2019-02-04 PROCEDURE — 74011000258 HC RX REV CODE- 258: Performed by: INTERNAL MEDICINE

## 2019-02-04 PROCEDURE — 96374 THER/PROPH/DIAG INJ IV PUSH: CPT | Performed by: EMERGENCY MEDICINE

## 2019-02-04 PROCEDURE — C9113 INJ PANTOPRAZOLE SODIUM, VIA: HCPCS | Performed by: INTERNAL MEDICINE

## 2019-02-04 PROCEDURE — 94660 CPAP INITIATION&MGMT: CPT

## 2019-02-04 PROCEDURE — 82803 BLOOD GASES ANY COMBINATION: CPT

## 2019-02-04 PROCEDURE — 71045 X-RAY EXAM CHEST 1 VIEW: CPT

## 2019-02-04 PROCEDURE — 77030005520 HC CATH URETH FOL38 BARD -A

## 2019-02-04 PROCEDURE — 94640 AIRWAY INHALATION TREATMENT: CPT

## 2019-02-04 PROCEDURE — 84100 ASSAY OF PHOSPHORUS: CPT

## 2019-02-04 PROCEDURE — 74011250637 HC RX REV CODE- 250/637: Performed by: INTERNAL MEDICINE

## 2019-02-04 PROCEDURE — 81015 MICROSCOPIC EXAM OF URINE: CPT

## 2019-02-04 PROCEDURE — 80053 COMPREHEN METABOLIC PANEL: CPT

## 2019-02-04 PROCEDURE — 82962 GLUCOSE BLOOD TEST: CPT

## 2019-02-04 PROCEDURE — 87804 INFLUENZA ASSAY W/OPTIC: CPT

## 2019-02-04 PROCEDURE — 87040 BLOOD CULTURE FOR BACTERIA: CPT

## 2019-02-04 PROCEDURE — 81001 URINALYSIS AUTO W/SCOPE: CPT

## 2019-02-04 PROCEDURE — 74011000250 HC RX REV CODE- 250: Performed by: INTERNAL MEDICINE

## 2019-02-04 PROCEDURE — 83880 ASSAY OF NATRIURETIC PEPTIDE: CPT

## 2019-02-04 PROCEDURE — 36600 WITHDRAWAL OF ARTERIAL BLOOD: CPT

## 2019-02-04 PROCEDURE — 74011636637 HC RX REV CODE- 636/637: Performed by: INTERNAL MEDICINE

## 2019-02-04 PROCEDURE — 74011000250 HC RX REV CODE- 250: Performed by: EMERGENCY MEDICINE

## 2019-02-04 PROCEDURE — 74011000258 HC RX REV CODE- 258: Performed by: EMERGENCY MEDICINE

## 2019-02-04 PROCEDURE — 99285 EMERGENCY DEPT VISIT HI MDM: CPT | Performed by: EMERGENCY MEDICINE

## 2019-02-04 RX ORDER — ESCITALOPRAM OXALATE 10 MG/1
20 TABLET ORAL DAILY
Status: DISCONTINUED | OUTPATIENT
Start: 2019-02-05 | End: 2019-02-09 | Stop reason: HOSPADM

## 2019-02-04 RX ORDER — IPRATROPIUM BROMIDE 0.5 MG/2.5ML
0.5 SOLUTION RESPIRATORY (INHALATION)
Status: COMPLETED | OUTPATIENT
Start: 2019-02-04 | End: 2019-02-04

## 2019-02-04 RX ORDER — ALBUTEROL SULFATE 0.83 MG/ML
5 SOLUTION RESPIRATORY (INHALATION)
Status: DISCONTINUED | OUTPATIENT
Start: 2019-02-04 | End: 2019-02-04

## 2019-02-04 RX ORDER — ALBUTEROL SULFATE 2.5 MG/.5ML
10 SOLUTION RESPIRATORY (INHALATION)
Status: COMPLETED | OUTPATIENT
Start: 2019-02-04 | End: 2019-02-04

## 2019-02-04 RX ORDER — CLOPIDOGREL BISULFATE 75 MG/1
75 TABLET ORAL DAILY
Status: DISCONTINUED | OUTPATIENT
Start: 2019-02-05 | End: 2019-02-09 | Stop reason: HOSPADM

## 2019-02-04 RX ORDER — ALBUTEROL SULFATE 0.83 MG/ML
2.5 SOLUTION RESPIRATORY (INHALATION)
Status: DISCONTINUED | OUTPATIENT
Start: 2019-02-04 | End: 2019-02-09 | Stop reason: HOSPADM

## 2019-02-04 RX ORDER — METOPROLOL TARTRATE 25 MG/1
25 TABLET, FILM COATED ORAL 2 TIMES DAILY
Status: DISCONTINUED | OUTPATIENT
Start: 2019-02-04 | End: 2019-02-09 | Stop reason: HOSPADM

## 2019-02-04 RX ORDER — PANTOPRAZOLE SODIUM 40 MG/10ML
40 INJECTION, POWDER, LYOPHILIZED, FOR SOLUTION INTRAVENOUS EVERY 24 HOURS
Status: DISCONTINUED | OUTPATIENT
Start: 2019-02-04 | End: 2019-02-05

## 2019-02-04 RX ORDER — SODIUM CHLORIDE 0.9 % (FLUSH) 0.9 %
5-40 SYRINGE (ML) INJECTION AS NEEDED
Status: DISCONTINUED | OUTPATIENT
Start: 2019-02-04 | End: 2019-02-09 | Stop reason: HOSPADM

## 2019-02-04 RX ORDER — ONDANSETRON 2 MG/ML
4 INJECTION INTRAMUSCULAR; INTRAVENOUS
Status: COMPLETED | OUTPATIENT
Start: 2019-02-04 | End: 2019-02-04

## 2019-02-04 RX ORDER — ACETAMINOPHEN 325 MG/1
650 TABLET ORAL
Status: DISCONTINUED | OUTPATIENT
Start: 2019-02-04 | End: 2019-02-09 | Stop reason: HOSPADM

## 2019-02-04 RX ORDER — SODIUM CHLORIDE 0.9 % (FLUSH) 0.9 %
5-40 SYRINGE (ML) INJECTION EVERY 8 HOURS
Status: DISCONTINUED | OUTPATIENT
Start: 2019-02-04 | End: 2019-02-09 | Stop reason: HOSPADM

## 2019-02-04 RX ORDER — LOSARTAN POTASSIUM 50 MG/1
25 TABLET ORAL DAILY
Status: DISCONTINUED | OUTPATIENT
Start: 2019-02-05 | End: 2019-02-09 | Stop reason: HOSPADM

## 2019-02-04 RX ORDER — INSULIN GLARGINE 100 [IU]/ML
80 INJECTION, SOLUTION SUBCUTANEOUS
Status: DISCONTINUED | OUTPATIENT
Start: 2019-02-04 | End: 2019-02-04

## 2019-02-04 RX ORDER — GUAIFENESIN 600 MG/1
1200 TABLET, EXTENDED RELEASE ORAL 2 TIMES DAILY
Status: DISCONTINUED | OUTPATIENT
Start: 2019-02-04 | End: 2019-02-09 | Stop reason: HOSPADM

## 2019-02-04 RX ORDER — OSELTAMIVIR PHOSPHATE 75 MG/1
75 CAPSULE ORAL EVERY 12 HOURS
Status: COMPLETED | OUTPATIENT
Start: 2019-02-04 | End: 2019-02-09

## 2019-02-04 RX ORDER — ASPIRIN 81 MG/1
81 TABLET ORAL DAILY
Status: DISCONTINUED | OUTPATIENT
Start: 2019-02-05 | End: 2019-02-09 | Stop reason: HOSPADM

## 2019-02-04 RX ORDER — ALBUTEROL SULFATE 0.83 MG/ML
5 SOLUTION RESPIRATORY (INHALATION) ONCE
Status: COMPLETED | OUTPATIENT
Start: 2019-02-04 | End: 2019-02-05

## 2019-02-04 RX ORDER — ENOXAPARIN SODIUM 100 MG/ML
30 INJECTION SUBCUTANEOUS EVERY 12 HOURS
Status: DISCONTINUED | OUTPATIENT
Start: 2019-02-04 | End: 2019-02-05

## 2019-02-04 RX ORDER — MORPHINE SULFATE 2 MG/ML
6 INJECTION, SOLUTION INTRAMUSCULAR; INTRAVENOUS
Status: COMPLETED | OUTPATIENT
Start: 2019-02-04 | End: 2019-02-04

## 2019-02-04 RX ORDER — DEXTROSE 50 % IN WATER (D50W) INTRAVENOUS SYRINGE
25-50 AS NEEDED
Status: DISCONTINUED | OUTPATIENT
Start: 2019-02-04 | End: 2019-02-05

## 2019-02-04 RX ORDER — SODIUM CHLORIDE 9 MG/ML
50 INJECTION, SOLUTION INTRAVENOUS CONTINUOUS
Status: DISCONTINUED | OUTPATIENT
Start: 2019-02-04 | End: 2019-02-06

## 2019-02-04 RX ORDER — LEVOTHYROXINE SODIUM 100 UG/1
100 TABLET ORAL
Status: DISCONTINUED | OUTPATIENT
Start: 2019-02-05 | End: 2019-02-09 | Stop reason: HOSPADM

## 2019-02-04 RX ORDER — ALPRAZOLAM 0.5 MG/1
1 TABLET ORAL
Status: DISCONTINUED | OUTPATIENT
Start: 2019-02-04 | End: 2019-02-05

## 2019-02-04 RX ORDER — DEXTROSE 40 %
15 GEL (GRAM) ORAL AS NEEDED
Status: DISCONTINUED | OUTPATIENT
Start: 2019-02-04 | End: 2019-02-05

## 2019-02-04 RX ORDER — FUROSEMIDE 10 MG/ML
20 INJECTION INTRAMUSCULAR; INTRAVENOUS ONCE
Status: COMPLETED | OUTPATIENT
Start: 2019-02-04 | End: 2019-02-04

## 2019-02-04 RX ORDER — FACIAL-BODY WIPES
10 EACH TOPICAL DAILY PRN
Status: DISCONTINUED | OUTPATIENT
Start: 2019-02-04 | End: 2019-02-09 | Stop reason: HOSPADM

## 2019-02-04 RX ADMIN — MORPHINE SULFATE 6 MG: 2 INJECTION, SOLUTION INTRAMUSCULAR; INTRAVENOUS at 15:08

## 2019-02-04 RX ADMIN — OSELTAMIVIR PHOSPHATE 75 MG: 75 CAPSULE ORAL at 16:19

## 2019-02-04 RX ADMIN — ALBUTEROL SULFATE 2.5 MG: 2.5 SOLUTION RESPIRATORY (INHALATION) at 19:41

## 2019-02-04 RX ADMIN — ALBUTEROL SULFATE 10 MG: 2.5 SOLUTION RESPIRATORY (INHALATION) at 12:58

## 2019-02-04 RX ADMIN — METHYLPREDNISOLONE SODIUM SUCCINATE 40 MG: 125 INJECTION, POWDER, FOR SOLUTION INTRAMUSCULAR; INTRAVENOUS at 22:49

## 2019-02-04 RX ADMIN — ONDANSETRON 4 MG: 2 INJECTION INTRAMUSCULAR; INTRAVENOUS at 15:08

## 2019-02-04 RX ADMIN — PANTOPRAZOLE SODIUM 40 MG: 40 INJECTION, POWDER, FOR SOLUTION INTRAVENOUS at 18:17

## 2019-02-04 RX ADMIN — Medication 10 ML: at 17:05

## 2019-02-04 RX ADMIN — METHYLPREDNISOLONE SODIUM SUCCINATE 40 MG: 125 INJECTION, POWDER, FOR SOLUTION INTRAMUSCULAR; INTRAVENOUS at 18:17

## 2019-02-04 RX ADMIN — DOXYCYCLINE 100 MG: 100 INJECTION, POWDER, LYOPHILIZED, FOR SOLUTION INTRAVENOUS at 16:14

## 2019-02-04 RX ADMIN — GUAIFENESIN 1200 MG: 600 TABLET, EXTENDED RELEASE ORAL at 18:17

## 2019-02-04 RX ADMIN — FUROSEMIDE 20 MG: 10 INJECTION, SOLUTION INTRAMUSCULAR; INTRAVENOUS at 23:55

## 2019-02-04 RX ADMIN — IPRATROPIUM BROMIDE 0.5 MG: 0.5 SOLUTION RESPIRATORY (INHALATION) at 12:58

## 2019-02-04 RX ADMIN — SODIUM CHLORIDE 8.9 UNITS/HR: 9 INJECTION, SOLUTION INTRAVENOUS at 22:20

## 2019-02-04 RX ADMIN — METOPROLOL TARTRATE 25 MG: 25 TABLET ORAL at 18:17

## 2019-02-04 RX ADMIN — SODIUM CHLORIDE 50 ML/HR: 900 INJECTION, SOLUTION INTRAVENOUS at 17:19

## 2019-02-04 NOTE — H&P
HISTORY AND PHYSICAL Nayana Espinoza 2/4/2019 Date of Admission:  2/4/2019 The patient's chart is reviewed and the patient is discussed with the staff. Subjective:  
 
Patient is a 70 y. o. Caucasion female presents with progressive shortness of breath for 2 days. She reports subjective fevers  But had not checked her temp. Has had productive cough with yellow sputum. Denies nausea, vomiting or diarrhea. She has multiple family members at the bedside that are very concerned about her living arrangements--they feel she is living in [facility] and with other family members that use drugs. Chronic medical:  DM2, hypothyroidism, GERD, IBS, HTN, HL, CAD with hx stents, JEREMÍAS on O2 at 3 lpm with sleep (does not wear CPAP), COPD with rescue inhaler only, anxiety, depression, seizure, and tobacco abuse (~50 pack year history). Recurrent lice. In the ER Influenza A positive, Na 130, glucose 373. Has been placed on BIPAP, temp 99.3. Home DME company [place] [employer]. Review of Systems A comprehensive review of systems was negative except for: Constitutional: positive for fevers, sweats, fatigue and malaise Respiratory: positive for cough, sputum, dyspnea on exertion or home O2 3L Cardiovascular: positive for CAD with hx stents, dyspnea on exertion Gastrointestinal: positive for reflux symptoms Genitourinary: positive for urinary incontinence Musculoskeletal: positive for back pain and muscle weakness Behvioral/Psych: positive for anxiety, depression, obesity and tobacco use Endocrine: positive for DM Patient Active Problem List  
Diagnosis Code  DM (diabetes mellitus) w/o complication Q25.2  
 HTN (hypertension) - controlled, benign I10  
 COPD (chronic obstructive pulmonary disease) ([facility]) J44.9  Hypothyroidism E03.9  ASCAD - of the native vessel I25.110  
 Hypotension I95.9  Esophageal reflux K21.9  Stable angina ([facility]) I20.8  Chronic total occlusion of coronary artery(414.2) I25.82  Cystocele [id number]  Female stress incontinence N39.3  Lipid - hyperlipidemia other unsp dyslipidemia E78.5  Syncope and collapse R55  Osteoarthritis M19.90  Dyslipidemia E78.5  Acute on chronic respiratory failure with hypoxia and hypercapnia (HCC) J96.21, B82.51  
 Head lice infestation K87.4  Tobacco abuse Z72.0  
 Nocturnal hypoxemia G47.34  
 Fall W19. Estee Nova  Bradycardia R00.1  COPD exacerbation ([address] J44.1  Morbid obesity (HCC) E66.01  
 Hyponatremia E87.1  Influenza A (H5N1) J09. X2  
 JEREMÍAS (obstructive sleep apnea)- intolerant of CPAP G47.33 Prior to Admission Medications Prescriptions Last Dose Informant Patient Reported? Taking? ALPRAZolam (XANAX) 2 mg tablet   No No  
Sig: Take 0.5 Tabs by mouth three (3) times daily as needed for Anxiety. Max Daily Amount: 3 mg. Take / use AM day of surgery  per anesthesia protocols. Indications: ANXIETY WITH DEPRESSION  
OXYGEN-AIR DELIVERY SYSTEMS   Yes No  
Si L by Nasal route as needed (SOB). albuterol (PROAIR HFA) 90 mcg/actuation inhaler   Yes No  
Sig: Take 2 Puffs by inhalation every four (4) hours as needed. Take / use AM day of surgery  per anesthesia protocols if needed  
albuterol-ipratropium (DUO-NEB) 2.5 mg-0.5 mg/3 ml nebu   No No  
Sig: 3 mL by Nebulization route every six (6) hours as needed. aspirin 81 mg tablet   Yes No  
Sig: Take 81 mg by mouth daily. Take / use AM day of surgery  per anesthesia protocols. clopidogrel (PLAVIX) 75 mg tab   No No  
Sig: Take 1 Tab by mouth daily. escitalopram (LEXAPRO) 20 mg tablet   Yes No  
Sig: Take 20 mg by mouth daily. Take / use AM day of surgery  per anesthesia protocols. Indications: ANXIETY WITH DEPRESSION  
fenofibrate micronized (LOFIBRA) 200 mg capsule   No No  
Sig: TAKE ONE CAPSULE BY MOUTH EACH NIGHT AT BEDTIME FOR TRIGLYCERIDES AND CHOLESTEROL. fenofibrate micronized (LOFIBRA) 67 mg capsule   Yes No  
Sig: Take 67 mg by mouth every morning. fluticasone-salmeterol (ADVAIR) 250-50 mcg/dose diskus inhaler   No No  
Sig: Take 1 Puff by inhalation two (2) times a day. guaiFENesin (MUCINEX) 1,200 mg Ta12 ER tablet   Yes No  
Sig: Take 1,200 mg by mouth two (2) times a day. Indications: COLD SYMPTOMS, Cough  
insulin glargine (LANTUS) 100 unit/mL injection   Yes No  
Si Units by SubCUTAneous route nightly. Indications: TYPE 2 DIABETES MELLITUS  
levothyroxine (SYNTHROID) 100 mcg tablet   Yes No  
Sig: Take 100 mcg by mouth Daily (before breakfast). Per anesthesia protocol:instructed to take am of surgery. Indications: HYPOTHYROIDISM  
loperamide (IMMODIUM) 2 mg tablet   Yes No  
Sig: Take 2 mg by mouth four (4) times daily as needed for Diarrhea.  
losartan (COZAAR) 25 mg tablet   No No  
Sig: TAKE ONE TABLET BY MOUTH EVERY DAY FOR BLOOD PRESSURE  
meclizine (ANTIVERT) 25 mg tablet   Yes No  
Sig: Take 25 mg by mouth three (3) times daily as needed. Indications: VERTIGO  
metoprolol tartrate (LOPRESSOR) 25 mg tablet   No No  
Sig: Take 1 Tab by mouth two (2) times a day. niacin ER (NIASPAN) 500 mg tablet   No No  
Sig: TAKE ONE TABLET BY MOUTH ONCE DAILY FOR TRIGLYCERIDES AND CHOLESTEROL. nitroglycerin (NITROSTAT) 0.4 mg SL tablet   Yes No  
Si.4 mg by SubLINGual route every five (5) minutes as needed. Take / use AM day of surgery  per anesthesia protocols if needed  
pantoprazole (PROTONIX) 40 mg tablet   Yes No  
Sig: Take 40 mg by mouth two (2) times a day. Take / use AM day of surgery  per anesthesia protocols. Indications: GASTROESOPHAGEAL REFLUX  
predniSONE (DELTASONE) 20 mg tablet   No No  
Sig: Take 2 tabs daily x 2 days, take 1.5 tabs daily x 2 days, take 1 tab daily x 2 days, take 1/2 tab daily x 2 days then stop. rosuvastatin (CRESTOR) 10 mg tablet   Yes No  
Sig: Take 10 mg by mouth nightly. Indications: HYPERCHOLESTEROLEMIA Facility-Administered Medications: None Past Medical History:  
Diagnosis Date  Anxiety   
 managed with medication  Arthritis  ASCAD - of the native vessel 2/27/2013  Asthma  CAD (coronary artery disease) stents x 3  
 Cancer ([facility]   
 hx uterine cancer  Chronic pain d/t fibromyalgia  Claustrophobia  COPD   
 PRN inhaler; uses once every 2-3 months  Current smoker  Degenerative joint disease  Depression   
 managed with medication  Diabetes ([facility] type 2; normal fasting bs (  ); Insulin dependent; checks bs 4 times per day  Diverticulosis   
 managed with diet  DM (diabetes mellitus) w/o complication 8/55/6625  Dyslipidemia  Fatty liver  Fibromyalgia  GERD (gastroesophageal reflux disease)   
 controlled w/med  H/O echocardiogram 01/20/14 EF >69.7%  
 HTN (hypertension) - controlled, benign 2/27/2013  Hyperlipemia   
 managed with medication  Hypertension   
 controlled w/med  Hypothyroidism   
 managed with medication  IBS (irritable bowel syndrome) w/diverticulitis  Influenza A (H5N1) 2/4/2019  Lipid - hyperlipidemia other unsp dyslipidemia 6/9/2016  Macular degeneration  Mass of kidney  Murmur   
 monitored by Cardiologist, Evelia Everett; last echo 1/20/14  Myalgia and myositis, unspecified   
 no present treatment  Obesity BMI 39.1  JEREMÍAS (obstructive sleep apnea)- intolerant of CPAP 2/4/2019  Osteoarthrosis, unspecified whether generalized or localized, unspecified site   
 no present treatment  Psychiatric disorder   
 anxiety/depression  PUD (peptic ulcer disease) 1980  S/P total knee arthroplasty 6/22/2016  Seizures ([facility] 12/2011  
 s/p stopping xanax abruptly  Sleep apnea   
 noncompliant with CPAP  
 Status post total knee replacement 2/27/2013  Syncope and collapse 6/9/2016  Tobacco use disorder 6/9/2016  Urinary, incontinence, stress female w/ cystocele-rectocele repair  Vertigo   
 managed with prn medication Past Surgical History:  
Procedure Laterality Date  CARDIAC SURG PROCEDURE UNLIST    
 3 caths total; stents x3, last stent 02/2014  COLONOSCOPY    
 HX ANKLE FRACTURE TX Right   
 repair with hardware  HX APPENDECTOMY  HX BREAST LUMPECTOMY Bilateral   
 HX CARPAL TUNNEL RELEASE Right [facility]  HX KNEE ARTHROSCOPY Right  HX KNEE REPLACEMENT Left  HX LAP CHOLECYSTECTOMY  HX TUBAL LIGATION    
 HX UROLOGICAL    
 sling Social History Socioeconomic History  Marital status:  Spouse name: Not on file  Number of children: Not on file  Years of education: Not on file  Highest education level: Not on file Social Needs  Financial resource strain: Not on file  Food insecurity - worry: Not on file  Food insecurity - inability: Not on file  Transportation needs - medical: Not on file  Transportation needs - non-medical: Not on file Occupational History  Not on file Tobacco Use  Smoking status: Current Every Day Smoker Packs/day: 0.25 Years: 53.00 Pack years: 13.25  Smokeless tobacco: Never Used Substance and Sexual Activity  Alcohol use: No  
 Drug use: No  
 Sexual activity: Not on file Other Topics Concern  Not on file Social History Narrative Pt lives with son, DIL, and 3 grandchildren. She has one dog. She is retired. Family History Problem Relation Age of Onset  Heart Attack Mother MI age 70  
 Diabetes Mother  Heart Disease Mother  Heart Disease Sister  Heart Failure Sister 60  
     cabg  Diabetes Sister  Heart Disease Brother  Heart Attack Brother 28  
     mi  Cancer Brother  Heart Failure Sister 61  
     cabg  Heart Disease Sister  Cancer Father  Alzheimer Father  Heart Attack Brother Allergies Allergen Reactions  Lisinopril Swelling Throat swelling  Oxycodone Rash Current Facility-Administered Medications Medication Dose Route Frequency  albuterol (PROVENTIL VENTOLIN) nebulizer solution 5 mg  5 mg Nebulization NOW  doxycycline (VIBRAMYCIN) 100 mg in 0.9% sodium chloride (MBP/ADV) 100 mL  100 mg IntraVENous ONCE  
 oseltamivir (TAMIFLU) capsule 75 mg  75 mg Oral Q12H Current Outpatient Medications Medication Sig  
 losartan (COZAAR) 25 mg tablet TAKE ONE TABLET BY MOUTH EVERY DAY FOR BLOOD PRESSURE  fenofibrate micronized (LOFIBRA) 200 mg capsule TAKE ONE CAPSULE BY MOUTH EACH NIGHT AT BEDTIME FOR TRIGLYCERIDES AND CHOLESTEROL.  fenofibrate micronized (LOFIBRA) 67 mg capsule Take 67 mg by mouth every morning.  OXYGEN-AIR DELIVERY SYSTEMS 2 L by Nasal route as needed (SOB).  predniSONE (DELTASONE) 20 mg tablet Take 2 tabs daily x 2 days, take 1.5 tabs daily x 2 days, take 1 tab daily x 2 days, take 1/2 tab daily x 2 days then stop.  ALPRAZolam (XANAX) 2 mg tablet Take 0.5 Tabs by mouth three (3) times daily as needed for Anxiety. Max Daily Amount: 3 mg. Take / use AM day of surgery  per anesthesia protocols. Indications: ANXIETY WITH DEPRESSION  
 albuterol-ipratropium (DUO-NEB) 2.5 mg-0.5 mg/3 ml nebu 3 mL by Nebulization route every six (6) hours as needed.  fluticasone-salmeterol (ADVAIR) 250-50 mcg/dose diskus inhaler Take 1 Puff by inhalation two (2) times a day.  guaiFENesin (MUCINEX) 1,200 mg Ta12 ER tablet Take 1,200 mg by mouth two (2) times a day. Indications: COLD SYMPTOMS, Cough  metoprolol tartrate (LOPRESSOR) 25 mg tablet Take 1 Tab by mouth two (2) times a day.  niacin ER (NIASPAN) 500 mg tablet TAKE ONE TABLET BY MOUTH ONCE DAILY FOR TRIGLYCERIDES AND CHOLESTEROL.  clopidogrel (PLAVIX) 75 mg tab Take 1 Tab by mouth daily.   
 loperamide (IMMODIUM) 2 mg tablet Take 2 mg by mouth four (4) times daily as needed for Diarrhea.  pantoprazole (PROTONIX) 40 mg tablet Take 40 mg by mouth two (2) times a day. Take / use AM day of surgery  per anesthesia protocols. Indications: GASTROESOPHAGEAL REFLUX  meclizine (ANTIVERT) 25 mg tablet Take 25 mg by mouth three (3) times daily as needed. Indications: VERTIGO  albuterol (PROAIR HFA) 90 mcg/actuation inhaler Take 2 Puffs by inhalation every four (4) hours as needed. Take / use AM day of surgery  per anesthesia protocols if needed  aspirin 81 mg tablet Take 81 mg by mouth daily. Take / use AM day of surgery  per anesthesia protocols.  escitalopram (LEXAPRO) 20 mg tablet Take 20 mg by mouth daily. Take / use AM day of surgery  per anesthesia protocols. Indications: ANXIETY WITH DEPRESSION  nitroglycerin (NITROSTAT) 0.4 mg SL tablet 0.4 mg by SubLINGual route every five (5) minutes as needed. Take / use AM day of surgery  per anesthesia protocols if needed  levothyroxine (SYNTHROID) 100 mcg tablet Take 100 mcg by mouth Daily (before breakfast). Per anesthesia protocol:instructed to take am of surgery. Indications: HYPOTHYROIDISM  rosuvastatin (CRESTOR) 10 mg tablet Take 10 mg by mouth nightly. Indications: HYPERCHOLESTEROLEMIA  insulin glargine (LANTUS) 100 unit/mL injection 80 Units by SubCUTAneous route nightly. Indications: TYPE 2 DIABETES MELLITUS Objective:  
 
Vitals:  
 02/04/19 1220 02/04/19 1259 02/04/19 1408 BP: 126/58 Pulse: 87 Resp: 22 Temp: 99.3 °F (37.4 °C) SpO2: 92% 95% 92% Weight: 239 lb (108.4 kg) Height: 5' 6\" (1.676 m) PHYSICAL EXAM  
 
Constitutional:  the patient is obese and in no acute distress,  BIPAP, sat 93% EENMT:  Sclera clear, pupils equal, oral mucosa moist 
Respiratory: few anterior crackles, no wheezing Cardiovascular:  RRR without M,G,R 
Gastrointestinal: soft and non-tender; with positive bowel sounds. Musculoskeletal: warm without cyanosis. There is no lower leg edema. Skin:  no jaundice or rashes, no wounds Neurologic: no gross neuro deficits Psychiatric:  alert and oriented x 3 CXR 2/4/19: 
 
 
Recent Labs 02/04/19 
1225 WBC 7.8 HGB 13.0 HCT 40.8  Recent Labs 02/04/19 
1225 *  
K 4.3 CL 95* * CO2 29 BUN 11  
CREA 0.99 CA 8.4 ALB 3.3 TBILI 0.5 ALT 27 SGOT 40* No results for input(s): PH, PCO2, PO2, HCO3 in the last 72 hours. No results for input(s): LCAD, LAC in the last 72 hours. Assessment:  (Medical Decision Making) Hospital Problems  Date Reviewed: 2/4/2019 Codes Class Noted POA * (Principal) Acute on chronic respiratory failure with hypoxia and hypercapnia (HCC) ICD-10-CM: J96.21, J96.22 
ICD-9-CM: 518.84, 786.09, 799.02  2/4/2019 Yes Now on BIPAP for respiratory acidosis. Feeling better. Hyponatremia ICD-10-CM: E87.1 ICD-9-CM: 276.1  2/4/2019 Yes Etiology unclear. R/O heart failure with hx CAD Influenza A (H5N1) ICD-10-CM: J09. X2 
ICD-9-CM: 488.02  2/4/2019 Yes Tamiflu to start now. Symptomatic for 1-2 days JEREMÍAS (obstructive sleep apnea)- intolerant of CPAP ICD-10-CM: G47.33 
ICD-9-CM: 327.23  2/4/2019 Unknown Tolerating BIPAP now Morbid obesity ([facility] (Chronic) ICD-10-CM: E66.01 
ICD-9-CM: 278.01  4/17/2018 Yes Needs massive weight loss. DM (diabetes mellitus) w/o complication (Chronic) [facility]-NQ: E11.9 ICD-9-CM: 250.00  4/15/2018 Yes COPD (chronic obstructive pulmonary disease) (HCC) (Chronic) ICD-10-CM: J44.9 ICD-9-CM: 391  4/15/2018 Yes Severity unknown. Still smoking a little. Plan:  (Medical Decision Making) --Will admit to the ICU for further medical management 
--Supplemental O2 with BIPAP 
--Respiratory nebulizer treatments --Blood cultures--pending --Started on Doxycycline in ER 
--Add Tamiflu 
--Check BNP. More than 50% of the time documented was spent in face-to-face contact with the patient and in the care of the patient on the floor/unit where the patient is located. Sayda Monterroso NP Lungs:  Scattered rhonchi; BS decreased bilaterally Heart:  RRR with no Murmur/Rubs/Gallops Additional Comments:  71 yo F with COPD of unclear severity presenting with dyspnea, cough, body aches. Influenza A positive in ER. Requiring BIPAP for respiratory acidosis. Will check BNP given low Na as there could be an element of heart failure. Will maintain on BIPAP and admit to ICU. I have spoken with and examined the patient. I agree with the above assessment and plan as documented.  
 
Quinten Dancer, MD

## 2019-02-04 NOTE — ED TRIAGE NOTES
Pt reports hx of COPD, trouble breathing for 2 days, sepsis protocol intiated by EMS. Given 1g of rocephin at 1055. 1L bolus also given. And 125mg solumedrol. BGl 358, /78, temp 99.3

## 2019-02-04 NOTE — ED NOTES
Took bipap mask off for 45 seconds to take tamiflu capsule and oxygen dropped to 88%. Pt placed back on bipap and oxygen will not go past 89%. Pt sleeping on right side. Respiratory paged to check bipap

## 2019-02-04 NOTE — PROGRESS NOTES
02/04/19 1653 Oxygen Therapy O2 Sat (%) 93 % Pulse via Oximetry 95 beats per minute O2 Device BIPAP  
FIO2 (%) 45 % Respiratory Respiratory (WDL) X Skin Integumentary Skin Integumentary (WDL) WDL CPAP/BIPAP  
CPAP/BIPAP Start/Stop On Device Mode BIPAP Mask Type and Size Full face Skin Condition intact PIP Observed 17 cm H20 IPAP (cm H2O) 15 cm H2O  
EPAP (cm H2O) 8 cm H2O Inspiratory Time (sec) 0.9 seconds Vt Spont (ml) 488 ml Ve Observed (l/min) 9.4 l/min Backup Rate 8 Total RR (Spontaneous) 21 breaths per minute Leak (Estimated) 25 L/min Biomedical Check Performed Yes Settings Verified Yes Alarm Settings High Pressure 50 Low Pressure 5 Low Ve 2 High Rate 50 Low Rate 5 Pulmonary Toilet Pulmonary Toilet H. O.B elevated

## 2019-02-04 NOTE — ED PROVIDER NOTES
69-year-old female history of COPD on 2 L home oxygen, still smoking, coronary artery disease with stents, uterine cancer, diabetes, anxiety,hypertension,seizures and sleep apnea presents with increasing shortness of breath and nonproductive cough for the past 2 days. She denies fevers or chest pain. No leg swelling. She was given Rocephin, solumedrol, and albuterol by EMS. Sats are in low 90's on 6L. The history is provided by the patient. Shortness of Breath Associated symptoms include cough. Pertinent negatives include no fever, no headaches, no chest pain, no vomiting, no abdominal pain and no rash. Past Medical History:  
Diagnosis Date  Anxiety   
 managed with medication  Arthritis  ASCAD - of the native vessel 2/27/2013  Asthma  CAD (coronary artery disease) stents x 3  
 Cancer ([facility]   
 hx uterine cancer  Chronic pain d/t fibromyalgia  Claustrophobia  COPD   
 PRN inhaler; uses once every 2-3 months  Current smoker  Degenerative joint disease  Depression   
 managed with medication  Diabetes ([facility] [address] type 2; normal fasting bs (  ); Insulin dependent; checks bs 4 times per day  Diverticulosis   
 managed with diet  DM (diabetes mellitus) w/o complication 6/17/7140  Dyslipidemia  Fatty liver  Fibromyalgia  GERD (gastroesophageal reflux disease)   
 controlled w/med  H/O echocardiogram 01/20/14 EF >69.7%  
 HTN (hypertension) - controlled, benign 2/27/2013  Hyperlipemia   
 managed with medication  Hypertension   
 controlled w/med  Hypothyroidism   
 managed with medication  IBS (irritable bowel syndrome) w/diverticulitis  Lipid - hyperlipidemia other unsp dyslipidemia 6/9/2016  Macular degeneration  Mass of kidney  Murmur   
 monitored by Cardiologist, Jojo Patel; last echo 1/20/14  Myalgia and myositis, unspecified   
 no present treatment  Obesity BMI 39.1  Osteoarthrosis, unspecified whether generalized or localized, unspecified site   
 no present treatment  Psychiatric disorder   
 anxiety/depression  PUD (peptic ulcer disease) 1980  S/P total knee arthroplasty 6/22/2016  Seizures ([address] 12/2011  
 s/p stopping xanax abruptly  Sleep apnea   
 noncompliant with CPAP  
 Status post total knee replacement 2/27/2013  Syncope and collapse 6/9/2016  Tobacco use disorder 6/9/2016  Urinary, incontinence, stress female w/ cystocele-rectocele repair  Vertigo   
 managed with prn medication Past Surgical History:  
Procedure Laterality Date  CARDIAC SURG PROCEDURE UNLIST    
 3 caths total; stents x3, last stent 02/2014  COLONOSCOPY    
 HX ANKLE FRACTURE TX Right   
 repair with hardware  HX APPENDECTOMY  HX BREAST LUMPECTOMY Bilateral   
 HX CARPAL TUNNEL RELEASE Right [facility]  HX KNEE ARTHROSCOPY Right  HX KNEE REPLACEMENT Left  HX LAP CHOLECYSTECTOMY  HX TUBAL LIGATION    
 HX UROLOGICAL    
 sling Family History:  
Problem Relation Age of Onset  Heart Attack Mother MI age 70  
 Diabetes Mother  Heart Disease Mother  Heart Disease Sister  Heart Failure Sister 60  
     cabg  Diabetes Sister  Heart Disease Brother  Heart Attack Brother 28  
     mi  Cancer Brother  Heart Failure Sister 61  
     cabg  Heart Disease Sister  Cancer Father  Alzheimer Father  Heart Attack Brother Social History Socioeconomic History  Marital status:  Spouse name: Not on file  Number of children: Not on file  Years of education: Not on file  Highest education level: Not on file Social Needs  Financial resource strain: Not on file  Food insecurity - worry: Not on file  Food insecurity - inability: Not on file  Transportation needs - medical: Not on file  Transportation needs - non-medical: Not on file Occupational History  Not on file Tobacco Use  Smoking status: Current Every Day Smoker Packs/day: 0.25 Years: 53.00 Pack years: 13.25  Smokeless tobacco: Never Used Substance and Sexual Activity  Alcohol use: No  
 Drug use: No  
 Sexual activity: Not on file Other Topics Concern  Not on file Social History Narrative Pt lives with son, DIL, and 3 grandchildren. She has one dog. She is retired. ALLERGIES: Lisinopril and Oxycodone Review of Systems Constitutional: Positive for fatigue. Negative for chills and fever. HENT: Negative for hearing loss. Eyes: Negative for visual disturbance. Respiratory: Positive for cough and shortness of breath. Cardiovascular: Negative for chest pain and palpitations. Gastrointestinal: Negative for abdominal pain, diarrhea, nausea and vomiting. Musculoskeletal: Negative for back pain. Skin: Negative for rash. Neurological: Negative for weakness and headaches. Psychiatric/Behavioral: Negative for confusion. Vitals:  
 02/04/19 1220 BP: 126/58 Pulse: 87 Resp: 22 Temp: 99.3 °F (37.4 °C) SpO2: 92% Weight: 108.4 kg (239 lb) Height: 5' 6\" (1.676 m) Physical Exam  
Constitutional: She appears well-developed and well-nourished. She appears ill. HENT:  
Head: Normocephalic and atraumatic. Right Ear: External ear normal.  
Left Ear: External ear normal.  
Nose: Nose normal.  
Mouth/Throat: Oropharynx is clear and moist.  
Eyes: Conjunctivae are normal. Pupils are equal, round, and reactive to light. Neck: Normal range of motion. Neck supple. Cardiovascular: Regular rhythm, normal heart sounds and intact distal pulses. Pulmonary/Chest: Effort normal. Tachypnea noted. No respiratory distress. She has decreased breath sounds. She has wheezes. She has rhonchi. Abdominal: Soft. Bowel sounds are normal. She exhibits no distension. There is no tenderness. Musculoskeletal: Normal range of motion. She exhibits no edema. Neurological: She is alert. Skin: Skin is warm and dry. Psychiatric: Judgment normal.  
Nursing note and vitals reviewed. MDM Number of Diagnoses or Management Options Diagnosis management comments: Parts of this document were created using dragon voice recognition software. The chart has been reviewed but errors may still be present. 2:19 PM 
No improvement with hour-long neb. Had received 2 nebs prior to that. She reports persistent shortness of breath and somnolence. Patient acidotic and hypercarbic. Placed on BiPAP. Discussed with intensivist for admission. Amount and/or Complexity of Data Reviewed Clinical lab tests: reviewed and ordered (Results for orders placed or performed during the hospital encounter of 02/04/19 
-CBC WITH AUTOMATED DIFF Result                      Value             Ref Range WBC                         7.8               4.3 - 11.1 K* 
     RBC                         4.48              4.05 - 5.2 M* HGB                         13.0              11.7 - 15.4 * HCT                         40.8              35.8 - 46.3 % MCV                         91.1              79.6 - 97.8 * MCH                         29.0              26.1 - 32.9 * MCHC                        31.9              31.4 - 35.0 * RDW                         12.5              11.9 - 14.6 % PLATELET                    178               150 - 450 K/* MPV                         11.3              9.4 - 12.3 FL ABSOLUTE NRBC               0.00              0.0 - 0.2 K/* DF                          AUTOMATED NEUTROPHILS                 77                43 - 78 % LYMPHOCYTES                 15                13 - 44 %      MONOCYTES                   7                 4.0 - 12.0 %  
 EOSINOPHILS                 0 (L)             0.5 - 7.8 % BASOPHILS                   1                 0.0 - 2.0 % IMMATURE GRANULOCYTES       1                 0.0 - 5.0 %   
     ABS. NEUTROPHILS            6.0               1.7 - 8.2 K/* ABS. LYMPHOCYTES            1.2               0.5 - 4.6 K/* ABS. MONOCYTES              0.5               0.1 - 1.3 K/* ABS. EOSINOPHILS            0.0               0.0 - 0.8 K/* ABS. BASOPHILS              0.1               0.0 - 0.2 K/* ABS. IMM. GRANS.            0.1               0.0 - 0.5 K/* 
-METABOLIC PANEL, COMPREHENSIVE Result                      Value             Ref Range Sodium                      130 (L)           136 - 145 mm* Potassium                   4.3               3.5 - 5.1 mm* Chloride                    95 (L)            98 - 107 mmo* CO2                         29                21 - 32 mmol* Anion gap                   6 (L)             7 - 16 mmol/L Glucose                     373 (H)           65 - 100 mg/* BUN                         11                8 - 23 MG/DL Creatinine                  0.99              0.6 - 1.0 MG* 
     GFR est AA                  >60               >60 ml/min/1* GFR est non-AA              59 (L)            >60 ml/min/1* Calcium                     8.4               8.3 - 10.4 M* Bilirubin, total            0.5               0.2 - 1.1 MG* ALT (SGPT)                  27                12 - 65 U/L   
     AST (SGOT)                  40 (H)            15 - 37 U/L Alk. phosphatase            94                50 - 136 U/L Protein, total              7.7               6.3 - 8.2 g/* Albumin                     3.3               3.2 - 4.6 g/* Globulin                    4.4 (H)           2.3 - 3.5 g/* A-G Ratio                   0.8 (L)           1.2 - 3.5 -POC LACTIC ACID Result                      Value             Ref Range Lactic Acid (POC)           0.93              0.5 - 1.9 mm* 
-POC G3 Result                      Value             Ref Range Device:                     NASAL CANNULA pH (POC)                    7.248 (LL)        7.35 - 7.45   
     pCO2 (POC)                  58.8 (H)          35 - 45 MMHG  
     pO2 (POC)                   73 (L)            75 - 100 MMHG 
     HCO3 (POC)                  25.6              22 - 26 MMOL* 
     sO2 (POC)                   91 (L)            95 - 98 % Base deficit (POC)          3                 mmol/L Allens test (POC) NOT APPLICABLE Site RIGHT BRACHIAL Patient temp. 98.6 Specimen type (POC)         ARTERIAL Performed by                                                
 Diana 
     CO2, POC                    27                MMOL/L Flow rate (POC)             6.000             L/min Respiratory comment:        NurseNotified COLLECT TIME                1,353                           
) 
Tests in the radiology section of CPT®: ordered and reviewed (Xr Chest Sngl V Result Date: 2/4/2019 Portable AP upright chest dated 2/4/2019, 1243 hours Prior exam 4/15/2018 CLINICAL INFORMATION: Cough, wheezing, increasing shortness of breath Mild cardiomegaly. Mediastinum unremarkable. Vascularity does not appear congested. No pulmonary infiltrate or pleural effusion. IMPRESSION: No acute abnormality 
 
) Tests in the medicine section of CPT®: ordered and reviewed Procedures

## 2019-02-04 NOTE — ED NOTES
Pt assisted to bedside commode. Pt needed assistance. Urine specimen collected and sent. Vss with bipap

## 2019-02-05 ENCOUNTER — APPOINTMENT (OUTPATIENT)
Dept: GENERAL RADIOLOGY | Age: 69
DRG: 193 | End: 2019-02-05
Attending: INTERNAL MEDICINE
Payer: MEDICARE

## 2019-02-05 LAB
ADMINISTERED INITIALS, ADMINIT: NORMAL
ALBUMIN SERPL-MCNC: 2.8 G/DL (ref 3.2–4.6)
ALBUMIN/GLOB SERPL: 0.7 {RATIO} (ref 1.2–3.5)
ALP SERPL-CCNC: 75 U/L (ref 50–136)
ALT SERPL-CCNC: 22 U/L (ref 12–65)
ANION GAP SERPL CALC-SCNC: 5 MMOL/L (ref 7–16)
ANION GAP SERPL CALC-SCNC: 6 MMOL/L (ref 7–16)
ANION GAP SERPL CALC-SCNC: 7 MMOL/L (ref 7–16)
AST SERPL-CCNC: 24 U/L (ref 15–37)
BASOPHILS # BLD: 0 K/UL (ref 0–0.2)
BASOPHILS NFR BLD: 0 % (ref 0–2)
BILIRUB SERPL-MCNC: 0.3 MG/DL (ref 0.2–1.1)
BUN SERPL-MCNC: 19 MG/DL (ref 8–23)
BUN SERPL-MCNC: 19 MG/DL (ref 8–23)
BUN SERPL-MCNC: 21 MG/DL (ref 8–23)
CALCIUM SERPL-MCNC: 7.9 MG/DL (ref 8.3–10.4)
CALCIUM SERPL-MCNC: 8.2 MG/DL (ref 8.3–10.4)
CALCIUM SERPL-MCNC: 8.3 MG/DL (ref 8.3–10.4)
CHLORIDE SERPL-SCNC: 101 MMOL/L (ref 98–107)
CHLORIDE SERPL-SCNC: 104 MMOL/L (ref 98–107)
CHLORIDE SERPL-SCNC: 105 MMOL/L (ref 98–107)
CO2 SERPL-SCNC: 27 MMOL/L (ref 21–32)
CO2 SERPL-SCNC: 28 MMOL/L (ref 21–32)
CO2 SERPL-SCNC: 29 MMOL/L (ref 21–32)
CREAT SERPL-MCNC: 0.91 MG/DL (ref 0.6–1)
CREAT SERPL-MCNC: 0.92 MG/DL (ref 0.6–1)
CREAT SERPL-MCNC: 1.13 MG/DL (ref 0.6–1)
D50 ADMINISTERED, D50ADM: 0 ML
D50 ORDER, D50ORD: 0 ML
DIFFERENTIAL METHOD BLD: ABNORMAL
EOSINOPHIL # BLD: 0 K/UL (ref 0–0.8)
EOSINOPHIL NFR BLD: 0 % (ref 0.5–7.8)
ERYTHROCYTE [DISTWIDTH] IN BLOOD BY AUTOMATED COUNT: 12.7 % (ref 11.9–14.6)
GLOBULIN SER CALC-MCNC: 4 G/DL (ref 2.3–3.5)
GLSCOM COMMENTS: NORMAL
GLUCOSE BLD STRIP.AUTO-MCNC: 212 MG/DL (ref 65–100)
GLUCOSE BLD STRIP.AUTO-MCNC: 223 MG/DL (ref 65–100)
GLUCOSE BLD STRIP.AUTO-MCNC: 246 MG/DL (ref 65–100)
GLUCOSE BLD STRIP.AUTO-MCNC: 252 MG/DL (ref 65–100)
GLUCOSE BLD STRIP.AUTO-MCNC: 254 MG/DL (ref 65–100)
GLUCOSE BLD STRIP.AUTO-MCNC: 260 MG/DL (ref 65–100)
GLUCOSE BLD STRIP.AUTO-MCNC: 267 MG/DL (ref 65–100)
GLUCOSE BLD STRIP.AUTO-MCNC: 292 MG/DL (ref 65–100)
GLUCOSE BLD STRIP.AUTO-MCNC: 338 MG/DL (ref 65–100)
GLUCOSE BLD STRIP.AUTO-MCNC: 371 MG/DL (ref 65–100)
GLUCOSE BLD STRIP.AUTO-MCNC: 407 MG/DL (ref 65–100)
GLUCOSE BLD STRIP.AUTO-MCNC: 426 MG/DL (ref 65–100)
GLUCOSE BLD STRIP.AUTO-MCNC: 432 MG/DL (ref 65–100)
GLUCOSE SERPL-MCNC: 243 MG/DL (ref 65–100)
GLUCOSE SERPL-MCNC: 282 MG/DL (ref 65–100)
GLUCOSE SERPL-MCNC: 445 MG/DL (ref 65–100)
GLUCOSE, GLC: 212 MG/DL
GLUCOSE, GLC: 223 MG/DL
GLUCOSE, GLC: 246 MG/DL
GLUCOSE, GLC: 252 MG/DL
GLUCOSE, GLC: 267 MG/DL
GLUCOSE, GLC: 292 MG/DL
GLUCOSE, GLC: 338 MG/DL
GLUCOSE, GLC: 407 MG/DL
GLUCOSE, GLC: 426 MG/DL
HCT VFR BLD AUTO: 39.5 % (ref 35.8–46.3)
HGB BLD-MCNC: 12.6 G/DL (ref 11.7–15.4)
HIGH TARGET, HITG: 180 MG/DL
HIGH TARGET, HITG: 250 MG/DL
IMM GRANULOCYTES # BLD AUTO: 0.1 K/UL (ref 0–0.5)
IMM GRANULOCYTES NFR BLD AUTO: 1 % (ref 0–5)
INSULIN ADMINSTERED, INSADM: 10.4 UNITS/HOUR
INSULIN ADMINSTERED, INSADM: 11.1 UNITS/HOUR
INSULIN ADMINSTERED, INSADM: 11.6 UNITS/HOUR
INSULIN ADMINSTERED, INSADM: 12.4 UNITS/HOUR
INSULIN ADMINSTERED, INSADM: 13.4 UNITS/HOUR
INSULIN ADMINSTERED, INSADM: 3 UNITS/HOUR
INSULIN ADMINSTERED, INSADM: 4.9 UNITS/HOUR
INSULIN ADMINSTERED, INSADM: 7.3 UNITS/HOUR
INSULIN ADMINSTERED, INSADM: 7.4 UNITS/HOUR
INSULIN ORDER, INSORD: 10.4 UNITS/HOUR
INSULIN ORDER, INSORD: 11.1 UNITS/HOUR
INSULIN ORDER, INSORD: 11.6 UNITS/HOUR
INSULIN ORDER, INSORD: 12.4 UNITS/HOUR
INSULIN ORDER, INSORD: 13.4 UNITS/HOUR
INSULIN ORDER, INSORD: 3 UNITS/HOUR
INSULIN ORDER, INSORD: 4.9 UNITS/HOUR
INSULIN ORDER, INSORD: 7.3 UNITS/HOUR
INSULIN ORDER, INSORD: 7.4 UNITS/HOUR
LOW TARGET, LOT: 140 MG/DL
LOW TARGET, LOT: 150 MG/DL
LYMPHOCYTES # BLD: 0.7 K/UL (ref 0.5–4.6)
LYMPHOCYTES NFR BLD: 11 % (ref 13–44)
MAGNESIUM SERPL-MCNC: 1.6 MG/DL (ref 1.8–2.4)
MAGNESIUM SERPL-MCNC: 1.8 MG/DL (ref 1.8–2.4)
MAGNESIUM SERPL-MCNC: 2.5 MG/DL (ref 1.8–2.4)
MCH RBC QN AUTO: 29.1 PG (ref 26.1–32.9)
MCHC RBC AUTO-ENTMCNC: 31.9 G/DL (ref 31.4–35)
MCV RBC AUTO: 91.2 FL (ref 79.6–97.8)
MINUTES UNTIL NEXT BG, NBG: 60 MIN
MONOCYTES # BLD: 0.7 K/UL (ref 0.1–1.3)
MONOCYTES NFR BLD: 11 % (ref 4–12)
MULTIPLIER, MUL: 0.02
MULTIPLIER, MUL: 0.02
MULTIPLIER, MUL: 0.03
MULTIPLIER, MUL: 0.03
MULTIPLIER, MUL: 0.04
MULTIPLIER, MUL: 0.04
MULTIPLIER, MUL: 0.05
MULTIPLIER, MUL: 0.06
MULTIPLIER, MUL: 0.07
NEUTS SEG # BLD: 4.6 K/UL (ref 1.7–8.2)
NEUTS SEG NFR BLD: 77 % (ref 43–78)
NRBC # BLD: 0 K/UL (ref 0–0.2)
ORDER INITIALS, ORDINIT: NORMAL
PLATELET # BLD AUTO: 182 K/UL (ref 150–450)
PMV BLD AUTO: 11.2 FL (ref 9.4–12.3)
POTASSIUM SERPL-SCNC: 4.2 MMOL/L (ref 3.5–5.1)
POTASSIUM SERPL-SCNC: 4.6 MMOL/L (ref 3.5–5.1)
POTASSIUM SERPL-SCNC: 4.8 MMOL/L (ref 3.5–5.1)
PROT SERPL-MCNC: 6.8 G/DL (ref 6.3–8.2)
RBC # BLD AUTO: 4.33 M/UL (ref 4.05–5.2)
SODIUM SERPL-SCNC: 134 MMOL/L (ref 136–145)
SODIUM SERPL-SCNC: 139 MMOL/L (ref 136–145)
SODIUM SERPL-SCNC: 139 MMOL/L (ref 136–145)
WBC # BLD AUTO: 6 K/UL (ref 4.3–11.1)

## 2019-02-05 PROCEDURE — 74011636637 HC RX REV CODE- 636/637: Performed by: INTERNAL MEDICINE

## 2019-02-05 PROCEDURE — 74011000258 HC RX REV CODE- 258: Performed by: INTERNAL MEDICINE

## 2019-02-05 PROCEDURE — 83735 ASSAY OF MAGNESIUM: CPT

## 2019-02-05 PROCEDURE — 77010033711 HC HIGH FLOW OXYGEN

## 2019-02-05 PROCEDURE — 74011000250 HC RX REV CODE- 250: Performed by: INTERNAL MEDICINE

## 2019-02-05 PROCEDURE — 36415 COLL VENOUS BLD VENIPUNCTURE: CPT

## 2019-02-05 PROCEDURE — 80053 COMPREHEN METABOLIC PANEL: CPT

## 2019-02-05 PROCEDURE — 71045 X-RAY EXAM CHEST 1 VIEW: CPT

## 2019-02-05 PROCEDURE — 80048 BASIC METABOLIC PNL TOTAL CA: CPT

## 2019-02-05 PROCEDURE — 74011250637 HC RX REV CODE- 250/637: Performed by: INTERNAL MEDICINE

## 2019-02-05 PROCEDURE — 74011250636 HC RX REV CODE- 250/636: Performed by: INTERNAL MEDICINE

## 2019-02-05 PROCEDURE — 94760 N-INVAS EAR/PLS OXIMETRY 1: CPT

## 2019-02-05 PROCEDURE — 94640 AIRWAY INHALATION TREATMENT: CPT

## 2019-02-05 PROCEDURE — 85025 COMPLETE CBC W/AUTO DIFF WBC: CPT

## 2019-02-05 PROCEDURE — 74011250637 HC RX REV CODE- 250/637: Performed by: NURSE PRACTITIONER

## 2019-02-05 PROCEDURE — 74011250636 HC RX REV CODE- 250/636: Performed by: NURSE PRACTITIONER

## 2019-02-05 PROCEDURE — 94660 CPAP INITIATION&MGMT: CPT

## 2019-02-05 PROCEDURE — 82962 GLUCOSE BLOOD TEST: CPT

## 2019-02-05 PROCEDURE — 65270000029 HC RM PRIVATE

## 2019-02-05 PROCEDURE — 99232 SBSQ HOSP IP/OBS MODERATE 35: CPT | Performed by: INTERNAL MEDICINE

## 2019-02-05 RX ORDER — DEXTROSE 40 %
15 GEL (GRAM) ORAL AS NEEDED
Status: DISCONTINUED | OUTPATIENT
Start: 2019-02-05 | End: 2019-02-09 | Stop reason: HOSPADM

## 2019-02-05 RX ORDER — MAGNESIUM SULFATE HEPTAHYDRATE 40 MG/ML
2 INJECTION, SOLUTION INTRAVENOUS ONCE
Status: COMPLETED | OUTPATIENT
Start: 2019-02-05 | End: 2019-02-05

## 2019-02-05 RX ORDER — IBUPROFEN 200 MG
1 TABLET ORAL DAILY
Status: DISCONTINUED | OUTPATIENT
Start: 2019-02-06 | End: 2019-02-05

## 2019-02-05 RX ORDER — DEXTROSE 50 % IN WATER (D50W) INTRAVENOUS SYRINGE
25-50 AS NEEDED
Status: DISCONTINUED | OUTPATIENT
Start: 2019-02-05 | End: 2019-02-09 | Stop reason: HOSPADM

## 2019-02-05 RX ORDER — ALPRAZOLAM 0.5 MG/1
2 TABLET ORAL
Status: DISCONTINUED | OUTPATIENT
Start: 2019-02-05 | End: 2019-02-09 | Stop reason: HOSPADM

## 2019-02-05 RX ORDER — PANTOPRAZOLE SODIUM 40 MG/1
40 TABLET, DELAYED RELEASE ORAL
Status: DISCONTINUED | OUTPATIENT
Start: 2019-02-05 | End: 2019-02-09 | Stop reason: HOSPADM

## 2019-02-05 RX ORDER — INSULIN LISPRO 100 [IU]/ML
INJECTION, SOLUTION INTRAVENOUS; SUBCUTANEOUS
Status: DISCONTINUED | OUTPATIENT
Start: 2019-02-05 | End: 2019-02-05

## 2019-02-05 RX ORDER — ENOXAPARIN SODIUM 100 MG/ML
40 INJECTION SUBCUTANEOUS EVERY 24 HOURS
Status: DISCONTINUED | OUTPATIENT
Start: 2019-02-05 | End: 2019-02-09 | Stop reason: HOSPADM

## 2019-02-05 RX ORDER — INSULIN LISPRO 100 [IU]/ML
INJECTION, SOLUTION INTRAVENOUS; SUBCUTANEOUS
Status: DISCONTINUED | OUTPATIENT
Start: 2019-02-05 | End: 2019-02-09 | Stop reason: HOSPADM

## 2019-02-05 RX ORDER — IBUPROFEN 200 MG
1 TABLET ORAL DAILY
Status: DISCONTINUED | OUTPATIENT
Start: 2019-02-05 | End: 2019-02-09 | Stop reason: HOSPADM

## 2019-02-05 RX ORDER — INSULIN LISPRO 100 [IU]/ML
5 INJECTION, SOLUTION INTRAVENOUS; SUBCUTANEOUS ONCE
Status: COMPLETED | OUTPATIENT
Start: 2019-02-05 | End: 2019-02-05

## 2019-02-05 RX ORDER — INSULIN GLARGINE 100 [IU]/ML
80 INJECTION, SOLUTION SUBCUTANEOUS DAILY
Status: DISCONTINUED | OUTPATIENT
Start: 2019-02-05 | End: 2019-02-09 | Stop reason: HOSPADM

## 2019-02-05 RX ADMIN — ALBUTEROL SULFATE 2.5 MG: 2.5 SOLUTION RESPIRATORY (INHALATION) at 19:16

## 2019-02-05 RX ADMIN — METHYLPREDNISOLONE SODIUM SUCCINATE 40 MG: 125 INJECTION, POWDER, FOR SOLUTION INTRAMUSCULAR; INTRAVENOUS at 22:51

## 2019-02-05 RX ADMIN — DOXYCYCLINE 100 MG: 100 INJECTION, POWDER, LYOPHILIZED, FOR SOLUTION INTRAVENOUS at 22:51

## 2019-02-05 RX ADMIN — ALPRAZOLAM 2 MG: 0.5 TABLET ORAL at 15:23

## 2019-02-05 RX ADMIN — LOSARTAN POTASSIUM 25 MG: 50 TABLET ORAL at 08:58

## 2019-02-05 RX ADMIN — DOXYCYCLINE 100 MG: 100 INJECTION, POWDER, LYOPHILIZED, FOR SOLUTION INTRAVENOUS at 04:15

## 2019-02-05 RX ADMIN — SODIUM CHLORIDE 1000 ML: 900 INJECTION, SOLUTION INTRAVENOUS at 00:20

## 2019-02-05 RX ADMIN — ALBUTEROL SULFATE 2.5 MG: 2.5 SOLUTION RESPIRATORY (INHALATION) at 07:53

## 2019-02-05 RX ADMIN — LEVOTHYROXINE SODIUM 100 MCG: 100 TABLET ORAL at 08:58

## 2019-02-05 RX ADMIN — INSULIN GLARGINE 80 UNITS: 100 INJECTION, SOLUTION SUBCUTANEOUS at 09:11

## 2019-02-05 RX ADMIN — ALPRAZOLAM 1 MG: 0.5 TABLET ORAL at 07:39

## 2019-02-05 RX ADMIN — OSELTAMIVIR PHOSPHATE 75 MG: 75 CAPSULE ORAL at 08:58

## 2019-02-05 RX ADMIN — SODIUM CHLORIDE 7.4 UNITS/HR: 9 INJECTION, SOLUTION INTRAVENOUS at 08:53

## 2019-02-05 RX ADMIN — METHYLPREDNISOLONE SODIUM SUCCINATE 40 MG: 125 INJECTION, POWDER, FOR SOLUTION INTRAMUSCULAR; INTRAVENOUS at 08:55

## 2019-02-05 RX ADMIN — ASPIRIN 81 MG: 81 TABLET, COATED ORAL at 08:58

## 2019-02-05 RX ADMIN — ALBUTEROL SULFATE 2.5 MG: 2.5 SOLUTION RESPIRATORY (INHALATION) at 11:52

## 2019-02-05 RX ADMIN — INSULIN LISPRO 5 UNITS: 100 INJECTION, SOLUTION INTRAVENOUS; SUBCUTANEOUS at 17:01

## 2019-02-05 RX ADMIN — ESCITALOPRAM OXALATE 20 MG: 10 TABLET ORAL at 08:58

## 2019-02-05 RX ADMIN — ALBUTEROL SULFATE 2.5 MG: 2.5 SOLUTION RESPIRATORY (INHALATION) at 04:03

## 2019-02-05 RX ADMIN — ALBUTEROL SULFATE 2.5 MG: 2.5 SOLUTION RESPIRATORY (INHALATION) at 16:12

## 2019-02-05 RX ADMIN — INSULIN LISPRO 10 UNITS: 100 INJECTION, SOLUTION INTRAVENOUS; SUBCUTANEOUS at 16:34

## 2019-02-05 RX ADMIN — GUAIFENESIN 1200 MG: 600 TABLET, EXTENDED RELEASE ORAL at 17:16

## 2019-02-05 RX ADMIN — INSULIN LISPRO 6 UNITS: 100 INJECTION, SOLUTION INTRAVENOUS; SUBCUTANEOUS at 12:38

## 2019-02-05 RX ADMIN — INSULIN LISPRO 15 UNITS: 100 INJECTION, SOLUTION INTRAVENOUS; SUBCUTANEOUS at 21:38

## 2019-02-05 RX ADMIN — CLOPIDOGREL 75 MG: 75 TABLET, FILM COATED ORAL at 08:58

## 2019-02-05 RX ADMIN — Medication 10 ML: at 08:55

## 2019-02-05 RX ADMIN — OSELTAMIVIR PHOSPHATE 75 MG: 75 CAPSULE ORAL at 22:51

## 2019-02-05 RX ADMIN — ENOXAPARIN SODIUM 40 MG: 40 INJECTION SUBCUTANEOUS at 22:51

## 2019-02-05 RX ADMIN — Medication 5 ML: at 23:22

## 2019-02-05 RX ADMIN — ALPRAZOLAM 2 MG: 0.5 TABLET ORAL at 21:38

## 2019-02-05 RX ADMIN — ALBUTEROL SULFATE 2.5 MG: 2.5 SOLUTION RESPIRATORY (INHALATION) at 23:12

## 2019-02-05 RX ADMIN — TIOTROPIUM BROMIDE 18 MCG: 18 CAPSULE ORAL; RESPIRATORY (INHALATION) at 09:00

## 2019-02-05 RX ADMIN — ALBUTEROL SULFATE 5 MG: 2.5 SOLUTION RESPIRATORY (INHALATION) at 00:29

## 2019-02-05 RX ADMIN — GUAIFENESIN 1200 MG: 600 TABLET, EXTENDED RELEASE ORAL at 08:58

## 2019-02-05 RX ADMIN — MAGNESIUM SULFATE HEPTAHYDRATE 2 G: 40 INJECTION, SOLUTION INTRAVENOUS at 09:12

## 2019-02-05 RX ADMIN — ENOXAPARIN SODIUM 30 MG: 40 INJECTION SUBCUTANEOUS at 00:18

## 2019-02-05 RX ADMIN — PANTOPRAZOLE SODIUM 40 MG: 40 TABLET, DELAYED RELEASE ORAL at 09:12

## 2019-02-05 RX ADMIN — INSULIN LISPRO 6 UNITS: 100 INJECTION, SOLUTION INTRAVENOUS; SUBCUTANEOUS at 09:11

## 2019-02-05 RX ADMIN — Medication 10 ML: at 12:55

## 2019-02-05 RX ADMIN — CALCIUM GLUCONATE 2 G: 98 INJECTION, SOLUTION INTRAVENOUS at 00:18

## 2019-02-05 NOTE — PROGRESS NOTES
MD called regarding patient blood glucose of 432. Will give top of sliding scale while awaiting MD call.

## 2019-02-05 NOTE — PROGRESS NOTES
Spoke with Dr. Franklin Swan regarding patient xanax order. Patient reports that she takes Xanax 2 mg four times daily as needed for anxiety. Patient current order reads 1 mg three times a day. MD states, \"order whatever she was taking at home. Patient PTA list states Xanax 2 mg, take 0.5 tab three times a day as needed. Called [facility] in [place] and verified with patient pharmacy that her current prescription is for xanax 2 mg, four times a day as needed for anxiety. Will reorder patient's home medication. Patient also requests Nicotine patch. Will order per Dr. Milana Ferrell request. 
 
Patient notified and agreeable to wait for higher dose of xanax to become available.

## 2019-02-05 NOTE — PROGRESS NOTES
MD notified of administration of 10 units humalog for top of scale. MD orders another 5 units humalog be given at this time and modify patient to insulin resistant scale.

## 2019-02-05 NOTE — PROGRESS NOTES
Critical Care Daily Progress Note: 2/5/2019 William Whatley Admission Date: 2/4/2019 The patient's chart is reviewed and the patient is discussed with the staff. 
 
70 y. o.CF presents with progressive shortness of breath for 2 days, subjective fevers and productive cough with yellow sputum. Multiple family members are very concerned about her living arrangements--they feel she is living in [facility] and with other family members that use drugs. Chronic medical:  DM2, hypothyroidism, GERD, IBS, HTN, HL, CAD with hx stents, JEREMÍAS on O2 at 3 lpm with sleep (does not wear CPAP), COPD with rescue inhaler only, anxiety, depression, seizure, and tobacco abuse (~50 pack year history). Recurrent lice. 
  
In the ER Influenza A positive, Tamilful added, Na 130, glucose 373. Has been placed on BIPAP, temp 99.3. Admitted to the ICU. 
  
    
Subjective: Wore BIPAP since admission. Was placed on Insulin drip for hyperglycemia. Just changed to Air-Vo and stats is breathing \"OK\". Has occasional dry cough. Current Facility-Administered Medications Medication Dose Route Frequency  insulin glargine (LANTUS) injection 80 Units  80 Units SubCUTAneous DAILY  pantoprazole (PROTONIX) tablet 40 mg  40 mg Oral ACB  enoxaparin (LOVENOX) injection 40 mg  40 mg SubCUTAneous Q24H  
 insulin lispro (HUMALOG) injection   SubCUTAneous AC&HS  magnesium sulfate 2 g/50 ml IVPB (premix or compounded)  2 g IntraVENous ONCE  
 oseltamivir (TAMIFLU) capsule 75 mg  75 mg Oral Q12H  ALPRAZolam (XANAX) tablet 1 mg  1 mg Oral TID PRN  
 aspirin delayed-release tablet 81 mg  81 mg Oral DAILY  clopidogrel (PLAVIX) tablet 75 mg  75 mg Oral DAILY  escitalopram oxalate (LEXAPRO) tablet 20 mg  20 mg Oral DAILY  guaiFENesin ER (MUCINEX) tablet 1,200 mg  1,200 mg Oral BID  levothyroxine (SYNTHROID) tablet 100 mcg  100 mcg Oral ACB  losartan (COZAAR) tablet 25 mg  25 mg Oral DAILY  metoprolol tartrate (LOPRESSOR) tablet 25 mg  25 mg Oral BID  sodium chloride (NS) flush 5-40 mL  5-40 mL IntraVENous Q8H  
 sodium chloride (NS) flush 5-40 mL  5-40 mL IntraVENous PRN  
 0.9% sodium chloride infusion  50 mL/hr IntraVENous CONTINUOUS  
 albuterol (PROVENTIL VENTOLIN) nebulizer solution 2.5 mg  2.5 mg Nebulization Q4H RT  
 tiotropium (SPIRIVA) inhalation capsule 18 mcg  1 Cap Inhalation DAILY  doxycycline (VIBRAMYCIN) 100 mg in 0.9% sodium chloride (MBP/ADV) 100 mL MBP  100 mg IntraVENous Q12H  
 acetaminophen (TYLENOL) tablet 650 mg  650 mg Oral Q4H PRN  
 bisacodyl (DULCOLAX) suppository 10 mg  10 mg Rectal DAILY PRN  
 dextrose 40% (GLUTOSE) oral gel 1 Tube  15 g Oral PRN  
 glucagon (GLUCAGEN) injection 1 mg  1 mg IntraMUSCular PRN  
 dextrose (D50W) injection syrg 12.5-25 g  25-50 mL IntraVENous PRN  
 insulin regular (NOVOLIN R, HUMULIN R) 100 Units in 0.9% sodium chloride 100 mL infusion  0-50 Units/hr IntraVENous TITRATE  methylPREDNISolone (PF) (Solu-MEDROL) injection 40 mg  40 mg IntraVENous Q12H Review of Systems Constitutional:  negative for fever, chills, sweats Cardiovascular:  negative for chest pain, palpitations, syncope, edema Gastrointestinal:  negative for dysphagia, reflux, vomiting, diarrhea, abdominal pain, or melena Neurologic:  negative for focal weakness, numbness, headache Objective:  
 
Vitals:  
 02/05/19 9822 02/05/19 4679 02/05/19 0389 02/05/19 2733 BP: 122/60 Pulse: 63 Resp: 30 Temp:      
SpO2: 97%  95% 95% Weight:  228 lb 9.9 oz (103.7 kg) Height:      
 
 
Intake and Output:  
02/03 1901 - 02/05 0700 In: -  
Out: [address] [Urine:2850] No intake/output data recorded. Physical Exam:         
Constitutional:  the patient is obese and in no acute distress, BIPAP--weaning to Air-Vo, sat 94% EENMT:  Sclera clear, pupils equal, oral mucosa moist 
Respiratory: few anterior crackles, dry cough, no wheezing Cardiovascular:  RRR without M,G,R 
Gastrointestinal: soft and non-tender; with positive bowel sounds. Musculoskeletal: warm without cyanosis. There is no lower leg edema. Skin:  no jaundice or rashes, no open wounds Neurologic: no gross neuro deficits Psychiatric:  alert and oriented x 3 LINES: 
PIV sites [place]yer 2/4/19 DRIPS: 
NS 100ml/hr Insulin drip CXR 2/5/19:  
 
 
LAB Recent Labs 02/05/19 
0935 02/05/19 
2092 02/05/19 
7735 02/05/19 
2514 02/05/19 
5369 GLUCPOC 246* 223* 212* 252* 267* Recent Labs 02/05/19 
0747 02/04/19 
1225 WBC 6.0 7.8 HGB 12.6 13.0 HCT 39.5 40.8  178 Recent Labs 02/05/19 
0747 02/05/19 
0420 02/05/19 
0013  02/04/19 
2200 02/04/19 
1225  139 134*   < >  --  130*  
K 4.6 4.2 4.8   < >  --  4.3  104 101   < >  --  95* CO2 27 29 28   < >  --  29  
* 282* 445*   < >  --  373* BUN 21 19 19   < >  --  11  
CREA 0.91 0.92 1.13*   < >  --  0.99  
MG 1.8 1.6*  --   --  1.7*  --   
PHOS  --   --   --   --  4.3* 3.3 CA 8.3 8.2* 7.9*   < >  --  8.4 ALB 2.8*  --   --   --   --  3.3 TBILI 0.3  --   --   --   --  0.5 ALT 22  --   --   --   --  27 SGOT 24  --   --   --   --  40*  
 < > = values in this interval not displayed. Recent Labs 02/04/19 
1843 02/04/19 
1356 PHI 7.245* 7.248* PCO2I 58.8* 58.8*  
PO2I 96 73* HCO3I 25.5 25.6 No results for input(s): LCAD, LAC in the last 72 hours. No results for input(s): PH, PCO2, PO2, HCO3 in the last 72 hours. Assessment:  (Medical Decision Making) Hospital Problems  Date Reviewed: 2/5/2019 Codes Class Noted POA * (Principal) Acute on chronic respiratory failure with hypoxia and hypercapnia (HCC) ICD-10-CM: J96.21, J96.22 
ICD-9-CM: 518.84, 786.09, 799.02  2/4/2019 Yes Only uses 3L at HS at home Hyponatremia ICD-10-CM: E87.1 ICD-9-CM: 276.1  2/4/2019 Yes Resolved--Na up to 139 Influenza A (H5N1) ICD-10-CM: J09. X2 
 ICD-9-CM: 488.02  2/4/2019 Yes Continue Tamiflu  
 JEREMÍAS (obstructive sleep apnea)- intolerant of CPAP ICD-10-CM: G47.33 
ICD-9-CM: 327.23  2/4/2019 Unknown O2 3L with sleep Acute on chronic respiratory failure (HCC) ICD-10-CM: J96.20 ICD-9-CM: 518.84  2/4/2019 Unknown Weaning off BIPAP Morbid obesity ([address] (Chronic) ICD-10-CM: E66.01 
ICD-9-CM: 278.01  4/17/2018 Yes  
 chronic DM (diabetes mellitus) w/o complication (Chronic) EAV-26-ZZ: E11.9 ICD-9-CM: 250.00  4/15/2018 Yes Elevated ranges--placed on Insulin drip last night COPD (chronic obstructive pulmonary disease) (HCC) (Chronic) ICD-10-CM: J44.9 ICD-9-CM: 421  4/15/2018 Yes  
 chronic Plan:  (Medical Decision Making) --Albuterol, Spiriva, Mucinex 
--Doxycycline day 2 
--Tamiflu 
--Will stop Insulin drip--resume Lantus --Solu Medrol 40mg q12h 
--Wean BIPAP >> Air-Vo 
--Mag 1.8--supplement 
--Add diabetic diet 
--Consult PT/OT for mobility 
--OK to move to the floor, will consult hospitalist to assume care--we will follow. --Will use continuous oximetry on the floor and continue Air-Vo. More than 50% of the time documented was spent in face-to-face contact with the patient and in the care of the patient on the floor/unit where the patient is located. Navdeep Hwang NP Lungs: less  wheezing Heart:  RRR with no Murmur/Rubs/Gallops Additional Comments:  Cont. BD, steroids, Doxycycline D #2 and Tamiflu. Wean O2 as tolerated and transfer to floor. The hospitalist is consulted and will assume care in am 
 
I have spoken with and examined the patient. I agree with the above assessment and plan as documented.  
 
Dennis Tapia MD

## 2019-02-05 NOTE — INTERDISCIPLINARY ROUNDS
Interdisciplinary team rounds were held 2/5/2019 with the following team members:Care Management, Nursing, Nurse Practitioner, Nutrition, Palliative Care, Pastoral Care, Pharmacy, Physical Therapy, Physician, Respiratory Therapy and Clinical Coordinator and the patient. Plan of care discussed. See clinical pathway and/or care plan for interventions and desired outcomes.

## 2019-02-05 NOTE — PROGRESS NOTES
Bedside report received from Birdie Landau, [facility]. Pt resting on BIPAP 15/8, fiO2 45%, unlabored, sat 95%. Last ABG yesterday @ 1843. 7.245/58.8/96/25.5. BNP 40. On insulin gtt @ 3, last glucose 212, gap 6. No family at bedside. VSS, sinus elein. Will continue to monitor.

## 2019-02-05 NOTE — PROGRESS NOTES
Patient to ICU from ED on non-rebreather and placed on bipap. Lung sounds diminished with productive cough noted. PERRL, strong cough, and strength equal in all extremities.  with insulin gtt infusing. Skin warm and dry. Scattered abrasions noted to upper back, and excoriation noted to abdominal folds. No pressure injury present, and Allevyn placed posteriorly.

## 2019-02-05 NOTE — ROUTINE PROCESS
TRANSFER - OUT REPORT: 
 
Verbal report given to Belia N Ashley Jenkins (name) on Zelda Nguyen  being transferred to [facility] (unit) for routine progression of care Report consisted of patients Situation, Background, Assessment and  
Recommendations(SBAR). Information from the following report(s) ED Summary was reviewed with the receiving nurse. Opportunity for questions and clarification was provided. Patient transported with: 
 [employer]

## 2019-02-05 NOTE — PROGRESS NOTES
Bedside, Verbal and Written shift change report given to Najma Iglesias RN (oncoming nurse) by Rekha Forte RN (offgoing nurse). Report included the following information SBAR, Kardex, ED Summary, Intake/Output, MAR, Recent Results and Cardiac Rhythm Sinus Rosette.

## 2019-02-05 NOTE — PROGRESS NOTES
TRANSFER - OUT REPORT: 
 
Verbal report given to KATHIE Saldivar on Ammy Feldman  being transferred to [address] [place] [address] for routine progression of care Report consisted of patients Situation, Background, Assessment and  
Recommendations(SBAR). Information from the following report(s) SBAR, ED Summary, Intake/Output, MAR, Recent Results and Cardiac Rhythm SB was reviewed with the receiving nurse. Lines:  
Peripheral IV 02/04/19 Left Hand (Active) Site Assessment Clean, dry, & intact 2/5/2019  7:16 AM  
Phlebitis Assessment 0 2/5/2019  7:16 AM  
Infiltration Assessment 0 2/5/2019  7:16 AM  
Dressing Status Clean, dry, & intact 2/5/2019  7:16 AM  
Dressing Type Tape;Transparent 2/5/2019  7:16 AM  
Hub Color/Line Status Green; Infusing 2/5/2019  7:16 AM  
Alcohol Cap Used No 2/5/2019  7:16 AM  
   
Peripheral IV 02/04/19 Left Hand (Active) Site Assessment Clean, dry, & intact 2/5/2019  7:16 AM  
Phlebitis Assessment 0 2/5/2019  7:16 AM  
Infiltration Assessment 0 2/5/2019  7:16 AM  
Dressing Status Clean, dry, & intact 2/5/2019  7:16 AM  
Dressing Type Tape;Transparent 2/5/2019  7:16 AM  
Hub Color/Line Status Pink; Infusing 2/5/2019  7:16 AM  
Action Taken Blood drawn 2/4/2019 10:05 PM  
Alcohol Cap Used No 2/5/2019  7:16 AM  
  
 
Opportunity for questions and clarification was provided. Patient transported with: 
 O2 @ [employer] Registered Nurse

## 2019-02-05 NOTE — PROGRESS NOTES
TRANSFER - IN REPORT: 
 
Verbal report received from KATHIE Juarez (name) on Francesco Smith  being received from ED (unit) for routine progression of care Report consisted of patients Situation, Background, Assessment and  
Recommendations(SBAR). Information from the following report(s) SBAR, Kardex, ED Summary, Intake/Output, MAR, Recent Results and Cardiac Rhythm Sinus eleni was reviewed with the receiving nurse. Opportunity for questions and clarification was provided. Assessment completed upon patients arrival to unit and care assumed.

## 2019-02-05 NOTE — PROGRESS NOTES
TRANSFER - IN REPORT: 
 
Verbal report received from Angela Azul RN on [address]  being received from ICU for routine progression of care Report consisted of patients Situation, Background, Assessment and  
Recommendations(SBAR). Information from the following report(s) SBAR, ED Summary, Intake/Output, Recent Results and Med Rec Status was reviewed with the receiving nurse. Opportunity for questions and clarification was provided. Assessment completed upon patients arrival to unit and care assumed.

## 2019-02-05 NOTE — PROGRESS NOTES
Patient has arrived to the floor via bed from ICU. Patient in stable condition. Respirations even/unlabored but wheezing noted bilaterally. Patient oriented to room. NS placed to pump and set to 50 mL/hr per MD order. Optiflow noted at 40%/40 LPM. Bed left in low/locked position. Call light and telephone within reach. No needs noted/voiced at this time. Will continue to monitor and administer insulin.

## 2019-02-05 NOTE — PROGRESS NOTES
Pt seen in ICU s/p admission acute on chronic respiratory failure and positive flu. She is alert and oriented currently. Tx to floor bed. Confirms demographics. CM to follow for any d/c needs, HH vs STR. PPD for any potential rehab. PT/OT already ordered per MD.  
 
Care Management Interventions PCP Verified by CM: Yes(confirms [facility] as PCP) Mode of Transport at Discharge: Other (see comment) Transition of Care Consult (CM Consult): Discharge Planning Discharge Durable Medical Equipment: (oxygen with [place], nebulizer, walker, cane,) Current Support Network: Relative's Home(lives with son and DIL with their 3 children. ) Confirm Follow Up Transport: Family(family provides transportation) Plan discussed with Pt/Family/Caregiver: Yes Freedom of Choice Offered: Yes [employer] Information Provided?: (confirms [facility] - able to get rx) Discharge Location Discharge Placement: Unable to determine at this time

## 2019-02-05 NOTE — PROGRESS NOTES
Patient remains in stable condition with respirations even/unlabored. No acute distress noted. No needs noted or voiced at this time. Safety measures in place. Patient laying in bed talking on phone. Call light remains within reach. Preparing to give report to oncoming shift.

## 2019-02-05 NOTE — PROGRESS NOTES
80 units lantus given at this time along with sliding scale humalog. Pt sitting up eating breakfast, on optiflow 40%, VSS, no distress. Will turn off insulin gtt 1 hr poist lantus @ 10:15.

## 2019-02-05 NOTE — PROGRESS NOTES
Patient states she has had a positive tb skin test in 1999, she had x-rays and has taken medication. Patient [facility] \"never needed another test again. \"

## 2019-02-05 NOTE — ED NOTES
Pt's blood sugar 506. Intensivist paged and consulted. Dr. Clementina Page gave verbal orders to start glucostabilizer protocol

## 2019-02-06 PROBLEM — J44.1 COPD EXACERBATION (HCC): Status: ACTIVE | Noted: 2019-02-06

## 2019-02-06 PROBLEM — J44.1 COPD EXACERBATION (HCC): Status: RESOLVED | Noted: 2018-04-16 | Resolved: 2019-02-06

## 2019-02-06 LAB
GLUCOSE BLD STRIP.AUTO-MCNC: 287 MG/DL (ref 65–100)
GLUCOSE BLD STRIP.AUTO-MCNC: 303 MG/DL (ref 65–100)
GLUCOSE BLD STRIP.AUTO-MCNC: 315 MG/DL (ref 65–100)
GLUCOSE BLD STRIP.AUTO-MCNC: 389 MG/DL (ref 65–100)
MAGNESIUM SERPL-MCNC: 2.2 MG/DL (ref 1.8–2.4)

## 2019-02-06 PROCEDURE — 74011250636 HC RX REV CODE- 250/636: Performed by: INTERNAL MEDICINE

## 2019-02-06 PROCEDURE — 74011636637 HC RX REV CODE- 636/637: Performed by: HOSPITALIST

## 2019-02-06 PROCEDURE — 74011636637 HC RX REV CODE- 636/637: Performed by: INTERNAL MEDICINE

## 2019-02-06 PROCEDURE — 97165 OT EVAL LOW COMPLEX 30 MIN: CPT

## 2019-02-06 PROCEDURE — 94762 N-INVAS EAR/PLS OXIMTRY CONT: CPT

## 2019-02-06 PROCEDURE — 74011250637 HC RX REV CODE- 250/637: Performed by: INTERNAL MEDICINE

## 2019-02-06 PROCEDURE — 36415 COLL VENOUS BLD VENIPUNCTURE: CPT

## 2019-02-06 PROCEDURE — 74011250637 HC RX REV CODE- 250/637: Performed by: NURSE PRACTITIONER

## 2019-02-06 PROCEDURE — 74011250637 HC RX REV CODE- 250/637: Performed by: HOSPITALIST

## 2019-02-06 PROCEDURE — 77030020263 HC SOL INJ SOD CL0.9% LFCR 1000ML

## 2019-02-06 PROCEDURE — 97161 PT EVAL LOW COMPLEX 20 MIN: CPT

## 2019-02-06 PROCEDURE — 74011250636 HC RX REV CODE- 250/636: Performed by: NURSE PRACTITIONER

## 2019-02-06 PROCEDURE — 74011000258 HC RX REV CODE- 258: Performed by: INTERNAL MEDICINE

## 2019-02-06 PROCEDURE — 82962 GLUCOSE BLOOD TEST: CPT

## 2019-02-06 PROCEDURE — 94760 N-INVAS EAR/PLS OXIMETRY 1: CPT

## 2019-02-06 PROCEDURE — 77010033678 HC OXYGEN DAILY

## 2019-02-06 PROCEDURE — 94640 AIRWAY INHALATION TREATMENT: CPT

## 2019-02-06 PROCEDURE — 65270000029 HC RM PRIVATE

## 2019-02-06 PROCEDURE — 99232 SBSQ HOSP IP/OBS MODERATE 35: CPT | Performed by: INTERNAL MEDICINE

## 2019-02-06 PROCEDURE — 83735 ASSAY OF MAGNESIUM: CPT

## 2019-02-06 PROCEDURE — 74011000250 HC RX REV CODE- 250: Performed by: INTERNAL MEDICINE

## 2019-02-06 RX ORDER — INSULIN LISPRO 100 [IU]/ML
10 INJECTION, SOLUTION INTRAVENOUS; SUBCUTANEOUS
Status: DISCONTINUED | OUTPATIENT
Start: 2019-02-06 | End: 2019-02-07

## 2019-02-06 RX ORDER — HYDROCODONE BITARTRATE AND ACETAMINOPHEN 5; 325 MG/1; MG/1
1 TABLET ORAL
Status: DISCONTINUED | OUTPATIENT
Start: 2019-02-06 | End: 2019-02-09 | Stop reason: HOSPADM

## 2019-02-06 RX ADMIN — OSELTAMIVIR PHOSPHATE 75 MG: 75 CAPSULE ORAL at 09:13

## 2019-02-06 RX ADMIN — SODIUM CHLORIDE 50 ML/HR: 900 INJECTION, SOLUTION INTRAVENOUS at 16:14

## 2019-02-06 RX ADMIN — Medication 10 ML: at 14:00

## 2019-02-06 RX ADMIN — TIOTROPIUM BROMIDE 18 MCG: 18 CAPSULE ORAL; RESPIRATORY (INHALATION) at 07:17

## 2019-02-06 RX ADMIN — ASPIRIN 81 MG: 81 TABLET, COATED ORAL at 09:13

## 2019-02-06 RX ADMIN — METOPROLOL TARTRATE 25 MG: 25 TABLET ORAL at 16:11

## 2019-02-06 RX ADMIN — ALBUTEROL SULFATE 2.5 MG: 2.5 SOLUTION RESPIRATORY (INHALATION) at 11:27

## 2019-02-06 RX ADMIN — ALPRAZOLAM 2 MG: 0.5 TABLET ORAL at 22:52

## 2019-02-06 RX ADMIN — ALBUTEROL SULFATE 2.5 MG: 2.5 SOLUTION RESPIRATORY (INHALATION) at 07:15

## 2019-02-06 RX ADMIN — LEVOTHYROXINE SODIUM 100 MCG: 100 TABLET ORAL at 05:44

## 2019-02-06 RX ADMIN — Medication 10 ML: at 22:53

## 2019-02-06 RX ADMIN — ALBUTEROL SULFATE 2.5 MG: 2.5 SOLUTION RESPIRATORY (INHALATION) at 19:47

## 2019-02-06 RX ADMIN — INSULIN GLARGINE 80 UNITS: 100 INJECTION, SOLUTION SUBCUTANEOUS at 09:13

## 2019-02-06 RX ADMIN — DOXYCYCLINE 100 MG: 100 INJECTION, POWDER, LYOPHILIZED, FOR SOLUTION INTRAVENOUS at 09:14

## 2019-02-06 RX ADMIN — INSULIN LISPRO 9 UNITS: 100 INJECTION, SOLUTION INTRAVENOUS; SUBCUTANEOUS at 12:36

## 2019-02-06 RX ADMIN — PANTOPRAZOLE SODIUM 40 MG: 40 TABLET, DELAYED RELEASE ORAL at 05:44

## 2019-02-06 RX ADMIN — Medication 10 ML: at 05:45

## 2019-02-06 RX ADMIN — OSELTAMIVIR PHOSPHATE 75 MG: 75 CAPSULE ORAL at 22:52

## 2019-02-06 RX ADMIN — INSULIN LISPRO 12 UNITS: 100 INJECTION, SOLUTION INTRAVENOUS; SUBCUTANEOUS at 22:52

## 2019-02-06 RX ADMIN — ALBUTEROL SULFATE 2.5 MG: 2.5 SOLUTION RESPIRATORY (INHALATION) at 04:45

## 2019-02-06 RX ADMIN — METHYLPREDNISOLONE SODIUM SUCCINATE 40 MG: 125 INJECTION, POWDER, FOR SOLUTION INTRAMUSCULAR; INTRAVENOUS at 09:12

## 2019-02-06 RX ADMIN — ENOXAPARIN SODIUM 40 MG: 40 INJECTION SUBCUTANEOUS at 22:53

## 2019-02-06 RX ADMIN — GUAIFENESIN 1200 MG: 600 TABLET, EXTENDED RELEASE ORAL at 09:12

## 2019-02-06 RX ADMIN — ALPRAZOLAM 2 MG: 0.5 TABLET ORAL at 05:44

## 2019-02-06 RX ADMIN — INSULIN LISPRO 10 UNITS: 100 INJECTION, SOLUTION INTRAVENOUS; SUBCUTANEOUS at 16:39

## 2019-02-06 RX ADMIN — HYDROCODONE BITARTRATE AND ACETAMINOPHEN 1 TABLET: 5; 325 TABLET ORAL at 16:09

## 2019-02-06 RX ADMIN — CLOPIDOGREL 75 MG: 75 TABLET, FILM COATED ORAL at 09:13

## 2019-02-06 RX ADMIN — ALBUTEROL SULFATE 2.5 MG: 2.5 SOLUTION RESPIRATORY (INHALATION) at 15:49

## 2019-02-06 RX ADMIN — ESCITALOPRAM OXALATE 20 MG: 10 TABLET ORAL at 09:10

## 2019-02-06 RX ADMIN — INSULIN LISPRO 15 UNITS: 100 INJECTION, SOLUTION INTRAVENOUS; SUBCUTANEOUS at 16:11

## 2019-02-06 RX ADMIN — ALBUTEROL SULFATE 2.5 MG: 2.5 SOLUTION RESPIRATORY (INHALATION) at 23:29

## 2019-02-06 RX ADMIN — INSULIN LISPRO 12 UNITS: 100 INJECTION, SOLUTION INTRAVENOUS; SUBCUTANEOUS at 05:44

## 2019-02-06 RX ADMIN — GUAIFENESIN 1200 MG: 600 TABLET, EXTENDED RELEASE ORAL at 16:10

## 2019-02-06 NOTE — PROGRESS NOTES
Problem: Self Care Deficits Care Plan (Adult) Goal:   
 
OCCUPATIONAL THERAPY: Initial Assessment, Discharge and AM 2/6/2019 INPATIENT:   
Payor: Julius Madelyn / Plan: [address] / Product Type: Managed Care Medicare /  
  
NAME/AGE/GENDER: Bharath Sage is a 70 y.o. female PRIMARY DIAGNOSIS:  Acute on chronic respiratory failure (HCC) [J96.20] Acute on chronic respiratory failure with hypoxia and hypercapnia (HCC) Acute on chronic respiratory failure with hypoxia and hypercapnia (HCC) ICD-10: Treatment Diagnosis:  
 · Generalized Muscle Weakness (M62.81) Precautions/Allergies: 
  Fall precautions Lisinopril and Oxycodone ASSESSMENT:  
 
Ms. Laure Rascon is a 70 y.o. female admitted with the above. At baseline, pt lives with son, daughter in law, and  and is grandchildren in a 1 level mobile home with 5 steps to enter. Pt is typically independent with ADLs, IADLs, and functional mobility within the home. Pt reports using 3L O2 NC at home. Pt utilizes rollator or SPC for community mobility. Pt takes sponge baths. Upon arrival, pt is alert and agreeable to OT evaluation. General UE screen revealed AROM WFL and strength 4+/5 in BUEs. Pt completes sit to stand with SBA, functional mobility to bathroom with SBA-CGA, toilet transfer with SBA, and returns to chair with SBA. O2 sats stable throughout session. Pt reports she is at her baseline for ADLs. Defer to PT for decreased activity tolerance. Pt left seated in chair with call bell within reach. Pt is currently functioning at/near baseline for ADLs. No acute OT needs identified at this time. Will d/c. This section established at most recent assessment PROBLEM LIST (Impairments causing functional limitations): 1. Decreased Activity Tolerance INTERVENTIONS PLANNED: (Benefits and precautions of occupational therapy have been discussed with the patient.) 1. None TREATMENT PLAN: Frequency/Duration: Eval & d/c 
  
 
RECOMMENDED REHABILITATION/EQUIPMENT: (at time of discharge pending progress): Due to the probability of continued deficits (see above) this patient will not likely need continued skilled occupational therapy after discharge. Equipment:  
? None at this time OCCUPATIONAL PROFILE AND HISTORY:  
History of Present Injury/Illness (Reason for Referral): 
See H&P. Past Medical History/Comorbidities: Ms. Carson Claening  has a past medical history of Anxiety, Arthritis, ASCAD - of the native vessel (2/27/2013), Asthma, CAD (coronary artery disease), Cancer ([address] Chronic pain, Claustrophobia, COPD, Current smoker, Degenerative joint disease, Depression, Diabetes ([address] Diverticulosis, DM (diabetes mellitus) w/o complication (2/93/3420), Dyslipidemia, Fatty liver, Fibromyalgia, GERD (gastroesophageal reflux disease), H/O echocardiogram (01/20/14), HTN (hypertension) - controlled, benign (2/27/2013), Hyperlipemia, Hypertension, Hypothyroidism, IBS (irritable bowel syndrome), Influenza A (H5N1) (2/4/2019), Lipid - hyperlipidemia other unsp dyslipidemia (6/9/2016), Macular degeneration, Mass of kidney, Murmur, Myalgia and myositis, unspecified, Obesity, JEREMÍAS (obstructive sleep apnea)- intolerant of CPAP (2/4/2019), Osteoarthrosis, unspecified whether generalized or localized, unspecified site, Psychiatric disorder, PUD (peptic ulcer disease) (1980), S/P total knee arthroplasty (6/22/2016), Seizures ([address] (12/2011), Sleep apnea, Status post total knee replacement (2/27/2013), Syncope and collapse (6/9/2016), Tobacco use disorder (6/9/2016), Urinary, incontinence, stress female, and Vertigo.  She also has no past medical history of Adverse effect of anesthesia, Aneurysm ([address] Arrhythmia, Autoimmune disease ([address] Chronic kidney disease, Coagulation defects, Dementia, Difficult intubation, Endocarditis, Heart failure ([address] Ill-defined condition, Malignant hyperthermia due to anesthesia, Nausea & vomiting, Pseudocholinesterase deficiency, Rheumatic fever, Stroke ([address] Thromboembolus ([address] or Unspecified adverse effect of anesthesia. Ms. Sushila Nevarez  has a past surgical history that includes colonoscopy; hx knee arthroscopy (Right); hx carpal tunnel release (Right); hx ankle fracture tx (Right); hx appendectomy; hx knee replacement (Left); hx lap cholecystectomy; pr cardiac surg procedure unlist; hx breast lumpectomy (Bilateral); hx hysterectomy (1979); hx tubal ligation; and hx urological. 
Social History/Living Environment:  
Home Environment: Trailer/mobile home # Steps to Enter: 5 Rails to Enter: Yes Hand Rails : Left One/Two Story Residence: One story Living Alone: No 
Support Systems: Child(keaton), Family member(s) Patient Expects to be Discharged to[de-identified] Private residence Current DME Used/Available at Home: Ofilia Jewel, straight, Roseann Stack, rollator Tub or Shower Type: Tub/Shower combination Prior Level of Function/Work/Activity: At baseline, pt lives with son, daughter in law, and  and is grandchildren in a 1 level mobile home with 5 steps to enter. Pt is typically independent with ADLs, IADLs, and functional mobility within the home. Pt utilizes rollator or SPC for community mobility. Pt takes sponge baths. Dominant Side:  
      RIGHT Personal Factors:   
      Sex:  female Age:  70 y.o. Number of Personal Factors/Comorbidities that affect the Plan of Care: Brief history (0):  LOW COMPLEXITY ASSESSMENT OF OCCUPATIONAL PERFORMANCE[de-identified]  
Activities of Daily Living:  
Basic ADLs (From Assessment) Complex ADLs (From Assessment) Feeding: Independent Oral Facial Hygiene/Grooming: Independent Bathing: Stand-by assistance Upper Body Dressing: Independent Lower Body Dressing: Modified independent Toileting: Modified independent Instrumental ADL Meal Preparation: Moderate assistance Homemaking: Moderate assistance Grooming/Bathing/Dressing Activities of Daily Living Cognitive Retraining Safety/Judgement: Awareness of environment; Fall prevention Functional Transfers Bathroom Mobility: Stand-by assistance;Contact guard assistance Toilet Transfer : Stand-by assistance Bed/Mat Mobility Supine to Sit: Modified independent Sit to Stand: Stand-by assistance Bed to Chair: Contact guard assistance;Stand-by assistance Most Recent Physical Functioning:  
Gross Assessment: 
AROM: Within functional limits(BUEs) Strength: Within functional limits(BUEs 4+/5) Coordination: Within functional limits(BUEs) Sensation: Intact(BUEs to light touch) Posture: 
  
Balance: 
Sitting: Intact Sitting - Static: Good (unsupported) Sitting - Dynamic: Fair (occasional) Standing: Impaired Standing - Static: Good Standing - Dynamic : Fair Bed Mobility: 
Supine to Sit: Modified independent Wheelchair Mobility: 
  
Transfers: 
Sit to Stand: Stand-by assistance Stand to Sit: Stand-by assistance Bed to Chair: Contact guard assistance;Stand-by assistance Patient Vitals for the past 6 hrs: 
 BP SpO2 O2 Flow Rate (L/min) Pulse 19 0717  94 % 6 l/min   
19 0759 105/47 92 %  67  
19 1001  94 %   Mental Status Neurologic State: Alert Orientation Level: Oriented X4 Cognition: Follows commands Perception: Appears intact Perseveration: No perseveration noted Safety/Judgement: Awareness of environment, Fall prevention Physical Skills Involved: 1. Activity Tolerance Defer to PT Cognitive Skills Affected (resulting in the inability to perform in a timely and safe manner): 1. None Psychosocial Skills Affected: 1. Self-Awareness Number of elements that affect the Plan of Care: 1-3:  LOW COMPLEXITY CLINICAL DECISION MAKIN [facility] [address] [address] AM-PAC 6 Clicks Daily Activity Inpatient Short Form How much help from another person does the patient currently need. .. Total A Lot A Little None 1. Putting on and taking off regular lower body clothing? [] 1   [] 2   [] 3   [x] 4  
2. Bathing (including washing, rinsing, drying)? [] 1   [] 2   [x] 3   [] 4  
3. Toileting, which includes using toilet, bedpan or urinal?   [] 1   [] 2   [] 3   [x] 4  
4. Putting on and taking off regular upper body clothing? [] 1   [] 2   [] 3   [x] 4  
5. Taking care of personal grooming such as brushing teeth? [] 1   [] 2   [] 3   [x] 4  
6. Eating meals? [] 1   [] 2   [] 3   [x] 4  
© 2007, Trustees of [facility] [employer], under license to [employer]. All rights reserved Score:  Initial: 23  2/6/2019  Most Recent: X (Date: -- ) Interpretation of Tool:  Represents activities that are increasingly more difficult (i.e. Bed mobility, Transfers, Gait). Medical Necessity:    
· NA. Reason for Services/Other Comments: 
· NA. Use of outcome tool(s) and clinical judgement create a POC that gives a: LOW COMPLEXITY  
 
 
 
TREATMENT:  
(In addition to Assessment/Re-Assessment sessions the following treatments were rendered) Pre-treatment Symptoms/Complaints:   
Pain: Initial:  
Pain Intensity 1: 8 Pain Location 1: Back Pain Intervention(s) 1: Ambulation/Increased Activity, Repositioned  Post Session:  same Assessment/Reassessment only, no treatment provided today Braces/Orthotics/Lines/Etc:  
· IV 
· wetzel catheter · O2 Monitor · O2 Device: Nasal cannula Treatment/Session Assessment:   
Response to Treatment:  Assessment only · Interdisciplinary Collaboration:  
o Physical Therapist 
o Occupational Therapist 
o Registered Nurse · After treatment position/precautions:  
o Up in chair 
o Bed alarm/tab alert on 
o Bed/Chair-wheels locked 
o Call light within reach 
o RN notified · Compliance with Program/Exercises: NA. 
· Recommendations/Intent for next treatment session: NA. Eval and d/c. Total Treatment Duration: OT Patient Time In/Time Out Time In: 5758 Time Out: 1026 Blanca Roach, OTR/L

## 2019-02-06 NOTE — PROGRESS NOTES
Bedside shift report received from [facility], [address] [facility]. Patient stable, alert and oriented. Patient on Airvo 30L 34% FiO2, continuous oxygen monitoring on patient. Sinus bradycardia at this time. Patient in no apparent distress, will continue to monitor. Family members at bedside. Bed in low, locked position with call light and belongings in reach.

## 2019-02-06 NOTE — PROGRESS NOTES
02/06/19 0800 Assessment  Type Assessment Type Shift assessment Activity and Safety Activity Level Bed Rest  
Ambulate No (Comment) Isabel's Egress Test Fail Activity In bed Activity Assistance Partial (one person) Repositioned Head of bed elevated (degrees) Patient Turned Turns self Head of Bed Elevated HOB 30 Safety Measures Bed/Chair-Wheels locked; Bed in low position;Call light within reach;Gripper socks; Side rails X2 Psychosocial  
Psychosocial (WDL) X Purposeful Interaction Yes Pt Identified Daily Priority Clinical issues (comment) Caring Interventions Reassure Reassure Caring rounds Caritas Process Nurture loving kindness; Teaching/learning Patient Behaviors Calm; Cooperative Neuro Neurologic State Alert Orientation Level Oriented X4  
LUE Motor Response Purposeful LLE Motor Response Purposeful RUE Motor Response Purposeful RLE Motor Response Purposeful Cognition Follows commands RASS Marcelino Agitation Sedation Scale (RASS) +1  
EENT  
EENT (WDL) WDL Visual Aid None Visual Impairment None Respiratory Respiratory (WDL) X Respiratory Pattern Regular Breath Sounds Bilateral Coarse Chest/Tracheal Assessment Chest expansion, symmetrical  
Cough Cough Strong;Non-productive Cardiac Cardiac (WDL) X  
B/P Outside of Defined Limits No  
Cardiac Regularity Regular Cardiac/Telemetry Monitor On No  
VTE Prophylaxis (Shift Required Documentation) Mechanical VTE Orders No  
Peripheral Vascular Peripheral Vascular (WDL) WDL Abdominal   
Abdominal (WDL) WDL Abdominal Assessment Intact Appetite Good Bowel Sounds Active Genitourinary Genitourinary (WDL) WDL Urine Assessment Urinary Status Sawyer;Draining Skin Integumentary Skin Integumentary (WDL) WDL Pressure  Injury Documentation No Pressure Injury Noted-Pressure Ulcer Prevention Initiated Wound Prevention and Protection Methods Wound Offloading (Prevention Methods) Bed, pressure reduction mattress;Pillows Isolation Precautions Isolation Type Droplet Isolation Status  Continue Neuro Neuro (WDL) WDL Pulmonary Toilet Pulmonary Toilet H. O.B elevated Musculoskeletal  
Musculoskeletal (WDL) WDL

## 2019-02-06 NOTE — PROGRESS NOTES
Date of Outreach Update: 
Bharath Sage was seen and assessed. Pt has no acute concerns at this time. O2 sats 96% on 5L NC. No c/o SOB or discomfort. MEWS Score: 1 (02/06/19 1521) Vitals:  
 02/06/19 1111 02/06/19 1127 02/06/19 1521 02/06/19 1549 BP: 114/52  115/62 Pulse: 61  76 Resp: 18  18 Temp: 98 °F (36.7 °C)  98.1 °F (36.7 °C) SpO2: 94% 91% 91% 99% Weight:      
Height:      
  
 
 Pain Assessment Pain Intensity 1: 5 (02/06/19 1711) Pain Location 1: Back Pain Intervention(s) 1: Medication (see MAR) Patient Stated Pain Goal: 3 Previous Outreach assessment has been reviewed. There have been no significant clinical changes since the completion of the last dated Outreach assessment. Will continue to follow up per outreach protocol. Signed By:   Fransisco Ogden RN   February 6, 2019 5:35 PM

## 2019-02-06 NOTE — PROGRESS NOTES
[facility] OUTREACH NURSE PROGRESS REPORT SUBJECTIVE: Called to assess patient secondary to recent transfer from ICU. MEWS Score: 1 (02/05/19 1519) Vitals:  
 02/05/19 1916 02/05/19 1918 02/05/19 2253 02/05/19 2312 BP:  102/49 101/56 Pulse:  (!) 57 (!) 59 Resp:  20 20 Temp:  97.6 °F (36.4 °C) 97.3 °F (36.3 °C) SpO2: 96% 97% 97% 98% Weight:      
Height:      
  
LAB DATA: 
 
Recent Labs 02/05/19 
1021 02/05/19 
0747 02/05/19 
0420 02/05/19 
0013  02/04/19 
2200  02/04/19 
1225 NA  --  139 139 134*   < >  --   --  130* K  --  4.6 4.2 4.8   < >  --   --  4.3 CL  --  105 104 101   < >  --   --  95* CO2  --  27 29 28   < >  --   --  29 AGAP  --  7 6* 5*   < >  --   --  6*  
GLU  --  243* 282* 445*   < >  --   --  373* BUN  --  21 19 19   < >  --   --  11  
CREA  --  0.91 0.92 1.13*   < >  --   --  0.99 GFRAA  --  >60 >60 >60   < >  --   --  >60  
GFRNA  --  >60 >60 51*   < >  --   --  59* CA  --  8.3 8.2* 7.9*   < >  --   --  8.4 MG 2.5* 1.8 1.6*  --   --  1.7*   < >  --   
PHOS  --   --   --   --   --  4.3*  --  3.3 ALB  --  2.8*  --   --   --   --   --  3.3 TP  --  6.8  --   --   --   --   --  7.7 GLOB  --  4.0*  --   --   --   --   --  4.4* AGRAT  --  0.7*  --   --   --   --   --  0.8* ALT  --  22  --   --   --   --   --  27  
 < > = values in this interval not displayed. Recent Labs 02/05/19 
0747 02/04/19 
1225 WBC 6.0 7.8 HGB 12.6 13.0 HCT 39.5 40.8  178 OBJECTIVE: On arrival to room, I found patient to be resting in bed on the phone. Pain Assessment Pain Intensity 1: 0 (02/05/19 1213) Patient Stated Pain Goal: 0 
 
  
  
  
  
 
  
  
  
   
 
ASSESSMENT:  Pt is alert and oriented on the phone with her son. Pt denies sob. Lungs cta, spo2 96% on airvo 30L and 34% fio2 with hr 68. Pt denies needs at this time. PLAN:  Continue to monitor per outreach protocol.

## 2019-02-06 NOTE — PROGRESS NOTES
Date of Outreach Update: 
Elliott Damon was seen and assessed. Pt awake, alert, oriented x 4, no acute concerns at this time. Lung sounds decreased, rhonchi noted in RUL. O2 sat currently 96%, hr 61. MEWS Score: 1 (02/06/19 0759) Vitals:  
 02/06/19 0717 02/06/19 0759 02/06/19 1001 02/06/19 1111 BP:  105/47  114/52 Pulse:  67  61 Resp:  18  18 Temp:  97.9 °F (36.6 °C)  98 °F (36.7 °C) SpO2: 94% 92% 94% 94% Weight:      
Height:      
  
 
 Pain Assessment Pain Intensity 1: 8 (02/06/19 1014) Pain Location 1: Back Pain Intervention(s) 1: Ambulation/Increased Activity, Repositioned Patient Stated Pain Goal: 0 Previous Outreach assessment has been reviewed. There have been no significant clinical changes since the completion of the last dated Outreach assessment. Will continue to follow up per outreach protocol. Signed By:   Gianni Bettencourt RN   February 6, 2019 11:42 AM

## 2019-02-06 NOTE — PROGRESS NOTES
Mily Vargas Admission Date: 2/4/2019 Daily Progress Note: 2/6/2019 The patient's chart is reviewed and the patient is discussed with the staff. 
 
 
70 y. o.CF presents with progressive shortness of breath for 2 days, subjective fevers and productive cough with yellow sputum.   Multiple family members are very concerned about her living arrangements--they feel she is living in [facility] and with other family members that use drugs. Chronic medical:  DM2, hypothyroidism, GERD, IBS, HTN, HL, CAD with hx stents, JEREMÍAS on O2 at 3 lpm with sleep (does not wear CPAP), COPD with rescue inhaler only, anxiety, depression, seizure, and tobacco abuse (~50 pack year history). Recurrent lice. 
  
In the ER Influenza A positive, Tamilful added, Na 130, glucose 373.  Has been placed on BIPAP, temp 99.3. Admitted to the ICU Subjective:  
Moved to floor yesterday,  Down to 4L nc,  Less sob Current Facility-Administered Medications Medication Dose Route Frequency  insulin glargine (LANTUS) injection 80 Units  80 Units SubCUTAneous DAILY  pantoprazole (PROTONIX) tablet 40 mg  40 mg Oral ACB  enoxaparin (LOVENOX) injection 40 mg  40 mg SubCUTAneous Q24H  
 insulin lispro (HUMALOG) injection   SubCUTAneous AC&HS  
 dextrose 40% (GLUTOSE) oral gel 1 Tube  15 g Oral PRN  
 glucagon (GLUCAGEN) injection 1 mg  1 mg IntraMUSCular PRN  
 dextrose (D50W) injection syrg 12.5-25 g  25-50 mL IntraVENous PRN  
 influenza vaccine 2018-19 (4 yrs+)(PF) (FLUCELVAX QUAD) injection 0.5 mL  0.5 mL IntraMUSCular PRIOR TO DISCHARGE  tuberculin injection 5 Units  5 Units IntraDERMal ONCE  
 ALPRAZolam (XANAX) tablet 2 mg  2 mg Oral Q4H PRN  
 nicotine (NICODERM CQ) 21 mg/24 hr patch 1 Patch  1 Patch TransDERmal DAILY  doxycycline (VIBRAMYCIN) 100 mg in 0.9% sodium chloride (MBP/ADV) 100 mL  100 mg IntraVENous Q12H  
 oseltamivir (TAMIFLU) capsule 75 mg  75 mg Oral Q12H  aspirin delayed-release tablet 81 mg  81 mg Oral DAILY  clopidogrel (PLAVIX) tablet 75 mg  75 mg Oral DAILY  escitalopram oxalate (LEXAPRO) tablet 20 mg  20 mg Oral DAILY  guaiFENesin ER (MUCINEX) tablet 1,200 mg  1,200 mg Oral BID  levothyroxine (SYNTHROID) tablet 100 mcg  100 mcg Oral ACB  losartan (COZAAR) tablet 25 mg  25 mg Oral DAILY  metoprolol tartrate (LOPRESSOR) tablet 25 mg  25 mg Oral BID  sodium chloride (NS) flush 5-40 mL  5-40 mL IntraVENous Q8H  
 sodium chloride (NS) flush 5-40 mL  5-40 mL IntraVENous PRN  
 0.9% sodium chloride infusion  50 mL/hr IntraVENous CONTINUOUS  
 albuterol (PROVENTIL VENTOLIN) nebulizer solution 2.5 mg  2.5 mg Nebulization Q4H RT  
 tiotropium (SPIRIVA) inhalation capsule 18 mcg  1 Cap Inhalation DAILY  acetaminophen (TYLENOL) tablet 650 mg  650 mg Oral Q4H PRN  
 bisacodyl (DULCOLAX) suppository 10 mg  10 mg Rectal DAILY PRN  
 insulin regular (NOVOLIN R, HUMULIN R) 100 Units in 0.9% sodium chloride 100 mL infusion  0-50 Units/hr IntraVENous TITRATE  methylPREDNISolone (PF) (Solu-MEDROL) injection 40 mg  40 mg IntraVENous Q12H Review of Systems Constitutional: negative for fever, chills, sweats Cardiovascular: negative for chest pain, palpitations, syncope, edema Gastrointestinal:  negative for dysphagia, reflux, vomiting, diarrhea, abdominal pain, or melena Neurologic:  negative for focal weakness, numbness, headache Objective:  
 
Vitals:  
 02/06/19 1931 02/06/19 8028 02/06/19 7986 02/06/19 4507 BP: 97/54   105/47 Pulse: (!) 58   67 Resp: 18   18 Temp: 97.6 °F (36.4 °C)   97.9 °F (36.6 °C) SpO2: 96% 90% 94% 92% Weight:      
Height:      
 
Intake and Output:  
02/04 1901 - 02/06 0700 In: 2215.8 [P.O.:840; I.V.:1375.8] Out: 3300 [LCZUU:9945] 02/06 0701 - 02/06 1900 In: 240 [P.O.:240] Out: 800 [Urine:800] Physical Exam:  
Constitution:  the patient is well developed and in no acute distress EENMT:  Sclera clear, pupils equal, oral mucosa moist 
Respiratory: few exp. wheezes Cardiovascular:  RRR without M,G,R 
Gastrointestinal: soft and non-tender; with positive bowel sounds. Musculoskeletal: warm without cyanosis. There is no lower leg edema. Skin:  no jaundice or rashes, no wounds Neurologic: no gross neuro deficits Psychiatric:  alert and oriented x 3 CXR:  
 
 
LAB Recent Labs 02/06/19 
0543 02/05/19 
2042 02/05/19 
1605 02/05/19 
1218 02/05/19 
0910 GLUCPOC 303* 371* 432* 260* 254* Recent Labs 02/05/19 
0747 02/04/19 
1225 WBC 6.0 7.8 HGB 12.6 13.0 HCT 39.5 40.8  178 Recent Labs 02/06/19 
7385 02/05/19 
1021 02/05/19 
0747 02/05/19 
0420 02/05/19 
0013  02/04/19 
2200  02/04/19 
1225 NA  --   --  139 139 134*   < >  --   --  130* K  --   --  4.6 4.2 4.8   < >  --   --  4.3 CL  --   --  105 104 101   < >  --   --  95* CO2  --   --  27 29 28   < >  --   --  29  
GLU  --   --  243* 282* 445*   < >  --   --  373* BUN  --   --  21 19 19   < >  --   --  11  
CREA  --   --  0.91 0.92 1.13*   < >  --   --  0.99  
MG 2.2 2.5* 1.8 1.6*  --   --  1.7*   < >  --   
CA  --   --  8.3 8.2* 7.9*   < >  --   --  8.4 PHOS  --   --   --   --   --   --  4.3*  --  3.3 ALB  --   --  2.8*  --   --   --   --   --  3.3 TBILI  --   --  0.3  --   --   --   --   --  0.5 ALT  --   --  22  --   --   --   --   --  27 SGOT  --   --  24  --   --   --   --   --  40*  
 < > = values in this interval not displayed. Recent Labs 02/04/19 
1843 02/04/19 
1356 PHI 7.245* 7.248* PCO2I 58.8* 58.8*  
PO2I 96 73* HCO3I 25.5 25.6 No results for input(s): LCAD, LAC in the last 72 hours. Assessment:  (Medical Decision Making) Hospital Problems  Date Reviewed: 2/5/2019 Codes Class Noted POA  
 COPD exacerbation ([facility] ICD-10-CM: J44.1 ICD-9-CM: 491.21  2/6/2019 Unknown  
 better * (Principal) Acute on chronic respiratory failure with hypoxia and hypercapnia (HCC) ICD-10-CM: J96.21, J96.22 
ICD-9-CM: 518.84, 786.09, 799.02  2/4/2019 Yes On 4 L Hyponatremia ICD-10-CM: E87.1 ICD-9-CM: 276.1  2/4/2019 Yes Influenza A (H5N1) ICD-10-CM: J09. X2 
ICD-9-CM: 488.02  2/4/2019 Yes JEREMÍAS (obstructive sleep apnea)- intolerant of CPAP ICD-10-CM: G47.33 
ICD-9-CM: 327.23  2/4/2019 Unknown Acute on chronic respiratory failure (HCC) ICD-10-CM: J96.20 ICD-9-CM: 518.84  2/4/2019 Unknown Morbid obesity ([address] (Chronic) ICD-10-CM: E66.01 
ICD-9-CM: 278.01  4/17/2018 Yes DM (diabetes mellitus) w/o complication (Chronic) HSQ-91-EC: E11.9 ICD-9-CM: 250.00  4/15/2018 Yes COPD (chronic obstructive pulmonary disease) (HCC) (Chronic) ICD-10-CM: J44.9 ICD-9-CM: 173  4/15/2018 Yes Plan:  (Medical Decision Making) 1   Wean O2 as tolerated 2   Remove wetzel 3  oob , pt.ot 
--Cont. BD, steroids, Doxycycline D #3 and Tamiflu. Wean O2 as tolerated More than 50% of the time documented was spent in face-to-face contact with the patient and in the care of the patient on the floor/unit where the patient is located.  
 
Frankie Centeno MD

## 2019-02-06 NOTE — PROGRESS NOTES
SBAR received from [employer] pt is  In bed alert and oriented. rr are labored O2 in place. Lung sounds are course. She has no c/o pain at current time. Skin intact. Safety measures in place will continue to monitor.

## 2019-02-06 NOTE — PROGRESS NOTES
Date of Outreach Update: 
Belinda Mendieta was seen and assessed. MEWS Score: 1 (02/05/19 2253) Vitals:  
 02/05/19 1918 02/05/19 2253 02/05/19 2312 02/06/19 1517 BP: 102/49 101/56  97/54 Pulse: (!) 57 (!) 59  (!) 58 Resp: 20 20  18 Temp: 97.6 °F (36.4 °C) 97.3 °F (36.3 °C)  97.6 °F (36.4 °C) SpO2: 97% 97% 98% 96% Weight:      
Height:      
  
 
 Pain Assessment Pain Intensity 1: 0 (02/06/19 0241) Patient Stated Pain Goal: 0 Previous Outreach assessment has been reviewed. There have been no significant clinical changes since the completion of the last dated Outreach assessment. Will continue to follow up per outreach protocol. Signed By:   Jodi Page   February 6, 2019 4:14 AM

## 2019-02-06 NOTE — PROGRESS NOTES
Problem: Falls - Risk of 
Goal: *Absence of Falls Document Sheridan Ariza Fall Risk and appropriate interventions in the flowsheet. Outcome: Progressing Towards Goal 
Fall Risk Interventions: 
  
 
  
 
Medication Interventions: Evaluate medications/consider consulting pharmacy Elimination Interventions: Call light in reach Problem: Pressure Injury - Risk of 
Goal: *Prevention of pressure injury Document Marcelo Scale and appropriate interventions in the flowsheet. Outcome: Progressing Towards Goal 
Pressure Injury Interventions: 
Sensory Interventions: Assess changes in LOC Activity Interventions: Pressure redistribution bed/mattress(bed type) Mobility Interventions: Assess need for specialty bed Nutrition Interventions: Document food/fluid/supplement intake Friction and Shear Interventions: Minimize layers, Lift sheet

## 2019-02-06 NOTE — PROGRESS NOTES
Progress Note Patient: Elke Quinonez               Sex: female          MRN: [id number] YOB: 1950      Age:  70 y.o. PCP:  Taylor Avelar MD 
Treatment Team: Attending Provider: Shelia De Santiago MD; Utilization Review: Wicho Smart RN; Care Manager: Estefania Moulton RN; Consulting Provider: Eduardo Middleton MD; Consulting Provider: Ally Ross MD 
 
Admission HPI: 
This is a 30-year-old female with a past medical history of diabetes, hypothyroidism, hypertension, coronary artery disease with a history of stents, hyperlipidemia, obstructive sleep apnea, COPD, anxiety, depression presents to the hospital with the shortness of breath for 2 days, fevers, cough with yellow sputum and currently admitted for influenza A infection, acute hypoxic respiratory failure requiring BiPAP, COPD exacerbation Subjective: Pt was seen this AM, still having SOB with wheezing, cough +, no fevers or chills, no CP, no N/V Copmplaints of Ch back pain Objective:  
Physical Exam:  
Visit Vitals /47 Pulse 67 Temp 97.9 °F (36.6 °C) Resp 18 Ht 5' 6\" (1.676 m) Wt 103.7 kg (228 lb 9.9 oz) SpO2 92% Breastfeeding? No  
BMI 36.90 kg/m² Temp (24hrs), Av.7 °F (36.5 °C), Min:97.3 °F (36.3 °C), Max:97.9 °F (36.6 °C) Oxygen Therapy O2 Sat (%): 92 % (19) Pulse via Oximetry: 56 beats per minute (19) O2 Device: Nasal cannula (19) O2 Flow Rate (L/min): 6 l/min (19) O2 Temperature: 87.8 °F (31 °C) (195) FIO2 (%): 44 % (19) Intake/Output Summary (Last 24 hours) at 2019 7138 Last data filed at 2019 4529 Gross per 24 hour Intake 2455.83 ml Output 2650 ml Net -194.17 ml General:- Conscious, Mild distress Eyes:- No pallor/icterus HENT- Oral Mucosa is Moist, Neck:- Supple, No JVD Lungs- Mild wheezing Heart:- S1 S2 regular Abdomen:- Soft, Positive bowel sounds, NTND, No guarding/rigidity/rebound tend. Extremities:-No pedal edema Neurologic: - AOX3, No acute FND, Psych: - Appropriate mood LAB Recent Results (from the past 24 hour(s)) MAGNESIUM Collection Time: 02/05/19 10:21 AM  
Result Value Ref Range Magnesium 2.5 (H) 1.8 - 2.4 mg/dL GLUCOSE, POC Collection Time: 02/05/19 12:18 PM  
Result Value Ref Range Glucose (POC) 260 (H) 65 - 100 mg/dL GLUCOSE, POC Collection Time: 02/05/19  4:05 PM  
Result Value Ref Range Glucose (POC) 432 (H) 65 - 100 mg/dL GLUCOSE, POC Collection Time: 02/05/19  8:42 PM  
Result Value Ref Range Glucose (POC) 371 (H) 65 - 100 mg/dL GLUCOSE, POC Collection Time: 02/06/19  5:43 AM  
Result Value Ref Range Glucose (POC) 303 (H) 65 - 100 mg/dL MAGNESIUM Collection Time: 02/06/19  6:39 AM  
Result Value Ref Range Magnesium 2.2 1.8 - 2.4 mg/dL No results found. No results found. All Micro Results Procedure Component Value Units Date/Time CULTURE, BLOOD [[id number]] Collected:  02/04/19 1225 Order Status:  Completed Specimen:  Blood Updated:  02/06/19 5385 Special Requests: --     
  LEFT Antecubital 
  
  Culture result: NO GROWTH 2 DAYS     
 CULTURE, BLOOD [[id number]] Collected:  02/04/19 1223 Order Status:  Completed Specimen:  Blood Updated:  02/06/19 4176 Special Requests: --     
  LEFT 
HAND Culture result: NO GROWTH 2 DAYS INFLUENZA A & B AG (RAPID TEST) [[id number]]  (Abnormal) Collected:  02/04/19 1410 Order Status:  Completed Specimen:  Nasopharyngeal from Nasal washing Updated:  02/04/19 1437 Influenza A Ag POSITIVE Comment: A POSITIVE RESULT MAY OCCUR IN THE ABSENCE OF VIABLE VIRUS Influenza B Ag NEGATIVE Comment: NEGATIVE FOR THE PRESENCE OF INFLUENZA B ANTIGEN 
INFECTION DUE TO INFLUENZA B CANNOT BE RULED OUT. BECAUSE THE ANTIGEN PRESENT IN THE SAMPLE MAY BE BELOW THE DETECTION LIMIT OF THE TEST. A NEGATIVE TEST IS PRESUMPTIVE AND IT IS RECOMMENDED THAT THESE RESULTS BE CONFIRMED BY VIRAL CULTURE OR AN FDA-CLEARED INFLUENZA A AND B MOLECULAR ASSAY. Source NASOPHARYNGEAL Current Medications Reviewed Current Facility-Administered Medications:  
  insulin glargine (LANTUS) injection 80 Units, 80 Units, SubCUTAneous, DAILY, Sandy Orellana MD, 80 Units at 02/06/19 1494   pantoprazole (PROTONIX) tablet 40 mg, 40 mg, Oral, ACB, Sandy Orellana MD, 40 mg at 02/06/19 2292   enoxaparin (LOVENOX) injection 40 mg, 40 mg, SubCUTAneous, Q24H, Sandy Orellana MD, 40 mg at 02/05/19 2251   insulin lispro (HUMALOG) injection, , SubCUTAneous, AC&HS, Mack Leal MD, 12 Units at 02/06/19 3875   dextrose 40% (GLUTOSE) oral gel 1 Tube, 15 g, Oral, PRN, Sandy Orellana MD 
  glucagon (GLUCAGEN) injection 1 mg, 1 mg, IntraMUSCular, PRN, Sandy Orellana MD 
  dextrose (D50W) injection syrg 12.5-25 g, 25-50 mL, IntraVENous, PRN, Sandy Orellana MD 
  influenza vaccine 2018-19 (4 yrs+)(PF) (FLUCELVAX QUAD) injection 0.5 mL, 0.5 mL, IntraMUSCular, PRIOR TO DISCHARGE, Sandy Orellana MD 
  tuberculin injection 5 Units, 5 Units, IntraDERMal, ONCE, Sandy Orellana MD 
  ALPRAZolam Marthann Valerio) tablet 2 mg, 2 mg, Oral, Q4H PRN, Mack Leal MD, 2 mg at 02/06/19 0544 
  nicotine (NICODERM CQ) 21 mg/24 hr patch 1 Patch, 1 Patch, TransDERmal, DAILY, Mack Leal MD, 1 Patch at 02/06/19 3432   doxycycline (VIBRAMYCIN) 100 mg in 0.9% sodium chloride (MBP/ADV) 100 mL, 100 mg, IntraVENous, Q12H, Carina Lopez MD, Last Rate: 100 mL/hr at 02/06/19 0914, 100 mg at 02/06/19 8162   oseltamivir (TAMIFLU) capsule 75 mg, 75 mg, Oral, Q12H, Westley Fuchs NP, 75 mg at 02/06/19 8678   aspirin delayed-release tablet 81 mg, 81 mg, Oral, DAILY, Carina Lopez MD, 81 mg at 02/06/19 3048   clopidogrel (PLAVIX) tablet 75 mg, 75 mg, Oral, DAILY, Makenzie Laguerre MD ABIEL, 75 mg at 02/06/19 2200   escitalopram oxalate (LEXAPRO) tablet 20 mg, 20 mg, Oral, DAILY, Clyde Kocher, MD, 20 mg at 02/06/19 0910 
  guaiFENesin ER (MUCINEX) tablet 1,200 mg, 1,200 mg, Oral, BID, Clyde Kocher, MD, 1,200 mg at 02/06/19 3275   levothyroxine (SYNTHROID) tablet 100 mcg, 100 mcg, Oral, ACB, Clyde Kocher, MD, 100 mcg at 02/06/19 3571   losartan (COZAAR) tablet 25 mg, 25 mg, Oral, DAILY, Clyde Kocher, MD, Stopped at 02/06/19 0911 
  metoprolol tartrate (LOPRESSOR) tablet 25 mg, 25 mg, Oral, BID, Clyde Kocher, MD, Stopped at 02/05/19 0900 
  sodium chloride (NS) flush 5-40 mL, 5-40 mL, IntraVENous, Q8H, Clyde Kocher, MD, 10 mL at 02/06/19 4666   sodium chloride (NS) flush 5-40 mL, 5-40 mL, IntraVENous, PRN, Clyde Kocher, MD 
  0.9% sodium chloride infusion, 50 mL/hr, IntraVENous, CONTINUOUS, Caprice Landeros NP, Last Rate: 50 mL/hr at 02/05/19 0857, 50 mL/hr at 02/05/19 5446   albuterol (PROVENTIL VENTOLIN) nebulizer solution 2.5 mg, 2.5 mg, Nebulization, Q4H RT, Clyde Kocher, MD, 2.5 mg at 02/06/19 1323   tiotropium (SPIRIVA) inhalation capsule 18 mcg, 1 Cap, Inhalation, DAILY, Clyde Kocher, MD, 18 mcg at 02/06/19 7200   acetaminophen (TYLENOL) tablet 650 mg, 650 mg, Oral, Q4H PRN, Clyde Kocher, MD 
  bisacodyl (DULCOLAX) suppository 10 mg, 10 mg, Rectal, DAILY PRN, Clyde Kocher, MD 
  insulin regular (NOVOLIN R, HUMULIN R) 100 Units in 0.9% sodium chloride 100 mL infusion, 0-50 Units/hr, IntraVENous, TITRATE, Amanda Guardado MD, Stopped at 02/05/19 8315   methylPREDNISolone (PF) (Solu-MEDROL) injection 40 mg, 40 mg, IntraVENous, Q12H, Amanda Guardado MD, 40 mg at 02/06/19 7933 Assessment/Plan Principal Problem: 
  Acute on chronic respiratory failure with hypoxia and hypercapnia ([facility] (2/4/2019) Active Problems: 
  DM (diabetes mellitus) w/o complication (6/80/6384) COPD (chronic obstructive pulmonary disease) ([facility] (4/15/2018) Morbid obesity ([facility] (4/17/2018) Hyponatremia (2/4/2019) Influenza A (H5N1) (2/4/2019) JEREMÍAS (obstructive sleep apnea)- intolerant of CPAP (2/4/2019) Acute on chronic respiratory failure ([facility] (2/4/2019) #Acute on chronic respiratory failure with hypoxia and hypercapnia #Hyponatremia #Influenza a infection #Uncontrolled diabetes mellitus #COPD exacerbation #Acute bronchitis #Hypothyroidism #Hypertension #Coronary artery disease with a history of stent placement #Anxiety and depression Plan Patient is currently on doxycycline, IV steroids, duo nebs, Tamiflu, hypoxia is improving, at home she is on 3 L of oxygen Fingerstick glucoses on the high side, added pre-meal insulin, continue with sliding scale insulin coverage Continue with rest of the home medications on Plavix 75 mg once daily, aspirin 81 mg, Lexapro 20 mg once daily, Synthroid 100 mcg, losartan 25 mcg milligrams once daily, metoprolol 25 minutes twice daily Blood pressure is on the low side we are holding off on blood pressure medications on metoprolol and losartan at home DVT prophylaxis with Lovenox GI prophylaxis with PPI Ralph Lambert MD 
February 6, 2019

## 2019-02-06 NOTE — PROGRESS NOTES
Problem: Mobility Impaired (Adult and Pediatric) Goal: *Acute Goals and Plan of Care (Insert Text) LTG: 
(1.)Ms. Pickering will move from supine to sit and sit to supine , scoot up and down and roll side to side in bed with INDEPENDENCE within 7 treatment day(s). (2.)Ms. Pickering will transfer from bed to chair and chair to bed with INDEPENDENCE using the least restrictive device within 7 treatment day(s). (3.)Ms. Pickering will ambulate with MODIFIED INDEPENDENCE for 250 feet with the least restrictive device within 7 treatment day(s). (4.)Ms. Pickering will participate in therapeutic activity/exercises x 23 minutes for increased activity tolerance within 7 days. ________________________________________________________________________________________________ PHYSICAL THERAPY: Initial Assessment and AM 2/6/2019 INPATIENT:   
Payor: Diana Vasquez / Plan: [address] / Product Type: [email or web address] /   
  
NAME/AGE/GENDER: Marimar Briceno is a 70 y.o. female PRIMARY DIAGNOSIS: Acute on chronic respiratory failure (HCC) [J96.20] Acute on chronic respiratory failure with hypoxia and hypercapnia (HCC) Acute on chronic respiratory failure with hypoxia and hypercapnia (HCC) ICD-10: Treatment Diagnosis:  
 · Generalized Muscle Weakness (M62.81) · Difficulty in walking, Not elsewhere classified (R26.2) Precaution/Allergies: 
Lisinopril and Oxycodone ASSESSMENT:  
 
Ms. Live Hernandez is a 70 y.o. female in the hospital for the above with PMH of COPD and supplemental O2 use at home. Pt reports that she lives in a one story mobile home with son and other family members that has 5 steps to enter. Pt also reported that PTA she was independent with ADLs and ambulated with independence for house hold distances. Pt stated she will use either rollator or SPC for ambulating in community.   Pt admitted to no recent falls in the past year. [facility]. [facility] and decreased strength in B hip flexors (4/5). During evaluation pt performed bed mobility with modified independence and good-fair sitting balance. Pt given SBA-CGA for STS and ambulating in room with RW. Her standing balance was good to fair and pt noted to have decreased gait speed. Post ambulatory SpO2 recorded at 91% with pre-mobility SpO2 of 94%. Ms. Elizabeth Howell could benefit from skilled PT as she is currently functioning slightly below her baseline. This section established at most recent assessment PROBLEM LIST (Impairments causing functional limitations): 1. Decreased Strength 2. Decreased Ambulation Ability/Technique 3. Decreased Balance 4. Decreased Activity Tolerance INTERVENTIONS PLANNED: (Benefits and precautions of physical therapy have been discussed with the patient.) 1. Balance Exercise 2. Bed Mobility 3. Family Education 4. Gait Training 5. Therapeutic Activites 6. Therapeutic Exercise/Strengthening 7. Transfer Training TREATMENT PLAN: Frequency/Duration: 3 times a week for duration of hospital stay Rehabilitation Potential For Stated Goals: Good RECOMMENDED REHABILITATION/EQUIPMENT: (at time of discharge pending progress): Due to the probability of continued deficits (see above) this patient will not likely need continued skilled physical therapy after discharge. Equipment:  
? None at this time HISTORY:  
History of Present Injury/Illness (Reason for Referral): 
See H&P Past Medical History/Comorbidities: Ms. Elizabeth Howell  has a past medical history of Anxiety, Arthritis, ASCAD - of the native vessel (2/27/2013), Asthma, CAD (coronary artery disease), Cancer ([address] Chronic pain, Claustrophobia, COPD, Current smoker, Degenerative joint disease, Depression, Diabetes ([facility] Diverticulosis, DM (diabetes mellitus) w/o complication (8/51/7912), Dyslipidemia, Fatty liver, Fibromyalgia, GERD (gastroesophageal reflux disease), H/O echocardiogram (01/20/14), HTN (hypertension) - controlled, benign (2/27/2013), Hyperlipemia, Hypertension, Hypothyroidism, IBS (irritable bowel syndrome), Influenza A (H5N1) (2/4/2019), Lipid - hyperlipidemia other unsp dyslipidemia (6/9/2016), Macular degeneration, Mass of kidney, Murmur, Myalgia and myositis, unspecified, Obesity, JEREMÍAS (obstructive sleep apnea)- intolerant of CPAP (2/4/2019), Osteoarthrosis, unspecified whether generalized or localized, unspecified site, Psychiatric disorder, PUD (peptic ulcer disease) (1980), S/P total knee arthroplasty (6/22/2016), Seizures ([address] (12/2011), Sleep apnea, Status post total knee replacement (2/27/2013), Syncope and collapse (6/9/2016), Tobacco use disorder (6/9/2016), Urinary, incontinence, stress female, and Vertigo. She also has no past medical history of Adverse effect of anesthesia, Aneurysm ([address] Arrhythmia, Autoimmune disease ([address] Chronic kidney disease, Coagulation defects, Dementia, Difficult intubation, Endocarditis, Heart failure ([address] Ill-defined condition, Malignant hyperthermia due to anesthesia, Nausea & vomiting, Pseudocholinesterase deficiency, Rheumatic fever, Stroke ([address] Thromboembolus ([address] or Unspecified adverse effect of anesthesia. Ms. Amanda Bustamante  has a past surgical history that includes colonoscopy; hx knee arthroscopy (Right); hx carpal tunnel release (Right); hx ankle fracture tx (Right); hx appendectomy; hx knee replacement (Left); hx lap cholecystectomy; pr cardiac surg procedure unlist; hx breast lumpectomy (Bilateral); hx hysterectomy (1979); hx tubal ligation; and hx urological. 
Social History/Living Environment:  
Home Environment: Trailer/mobile home # Steps to Enter: 5 Rails to Enter: Yes Hand Rails : Left One/Two Story Residence: One story Living Alone: No 
Support Systems: Child(keaton) Patient Expects to be Discharged to[de-identified] Private residence Current DME Used/Available at Home: Amber Handy, rollator, Nerissa Squibb, straight Prior Level of Function/Work/Activity: 
Lives with son and family in mobile home with steps to enter and PTA pt was independent with ADLs and house hold ambulation. Uses SPC/rollator for community ambulation. Number of Personal Factors/Comorbidities that affect the Plan of Care: 3+: HIGH COMPLEXITY EXAMINATION:  
Most Recent Physical Functioning:  
Gross Assessment: 
AROM: Within functional limits Strength: Generally decreased, functional 
         
  
Posture: 
  
Balance: 
Sitting: Impaired Sitting - Static: Good (unsupported) Sitting - Dynamic: Fair (occasional) Standing: Impaired Standing - Static: Good Standing - Dynamic : Fair Bed Mobility: 
Supine to Sit: Modified independent Wheelchair Mobility: 
  
Transfers: 
Sit to Stand: Contact guard assistance;Stand-by assistance Stand to Sit: Contact guard assistance Bed to Chair: Contact guard assistance;Stand-by assistance Gait: 
  
Speed/Coral: Slow Step Length: Left shortened;Right shortened Gait Abnormalities: Decreased step clearance;Trunk sway increased Distance (ft): 20 Feet (ft) Assistive Device: Walker, rolling Ambulation - Level of Assistance: Contact guard assistance;Stand-by assistance Body Structures Involved: 1. Lungs 2. Muscles Body Functions Affected: 1. Respiratory 2. Neuromusculoskeletal 
3. Movement Related Activities and Participation Affected: 1. Mobility 2. Domestic Life 3. Community, Social and [place] Number of elements that affect the Plan of Care: 4+: HIGH COMPLEXITY CLINICAL PRESENTATION:  
Presentation: Stable and uncomplicated: LOW COMPLEXITY CLINICAL DECISION MAKIN [facility] [address] [id number] AM-PAC 6 Clicks Basic Mobility Inpatient Short Form How much difficulty does the patient currently have. .. Unable A Lot A Little None 1.   Turning over in bed (including adjusting bedclothes, sheets and blankets)? [] 1   [] 2   [] 3   [x] 4  
2. Sitting down on and standing up from a chair with arms ( e.g., wheelchair, bedside commode, etc.)   [] 1   [] 2   [] 3   [x] 4  
3. Moving from lying on back to sitting on the side of the bed? [] 1   [] 2   [] 3   [x] 4 How much help from another person does the patient currently need. .. Total A Lot A Little None 4. Moving to and from a bed to a chair (including a wheelchair)? [] 1   [] 2   [x] 3   [] 4  
5. Need to walk in hospital room? [] 1   [] 2   [x] 3   [] 4  
6. Climbing 3-5 steps with a railing? [] 1   [] 2   [x] 3   [] 4  
© 2007, Trustees of [facility] [employer], under license to [employer]. All rights reserved Score:  Initial: 21 Most Recent: X (Date: -- ) Interpretation of Tool:  Represents activities that are increasingly more difficult (i.e. Bed mobility, Transfers, Gait). Medical Necessity:    
· Patient demonstrates good rehab potential due to higher previous functional level. Reason for Services/Other Comments: 
· Patient continues to require skilled intervention due to decreased balance and activity tolerance. Use of outcome tool(s) and clinical judgement create a POC that gives a: Clear prediction of patient's progress: LOW COMPLEXITY  
  
 
 
 
TREATMENT:  
(In addition to Assessment/Re-Assessment sessions the following treatments were rendered) Pre-treatment Symptoms/Complaints:  Chronic back/leg pain 
Pain: Initial:  
Pain Intensity 1: 8 Pain Location 1: Back, Leg 
Pain Orientation 1: Left, Right  Post Session:  8/10 Assessment/Reassessment only, no treatment provided today Braces/Orthotics/Lines/Etc:  
· IV 
· wetzel catheter · O2 Device: Nasal cannula Treatment/Session Assessment:   
· Response to Treatment:  Very well with only complaints of chronic pain. · Interdisciplinary Collaboration:  
o Physical Therapist 
o Occupational Therapist 
o Registered Nurse · After treatment position/precautions: o Up in chair 
o Bed/Chair-wheels locked 
o OT present · Compliance with Program/Exercises: Will assess as treatment progresses · Recommendations/Intent for next treatment session: \"Next visit will focus on advancements to more challenging activities and reduction in assistance provided\". Total Treatment Duration: PT Patient Time In/Time Out Time In: 1001 Time Out: 1013 Kameron Morgan PT, DPT

## 2019-02-06 NOTE — PROGRESS NOTES
Problem: Interdisciplinary Rounds Goal: Interdisciplinary Rounds Outcome: Progressing Towards Goal 
Interdisciplinary team rounds were held 2/6/2019 with the following team members:Care Management, Nursing, Physical Therapy, Physician and Clinical Coordinator and the patient. Plan of care discussed. See clinical pathway and/or care plan for interventions and desired outcomes.

## 2019-02-07 LAB
ALBUMIN SERPL-MCNC: 2.7 G/DL (ref 3.2–4.6)
ALBUMIN/GLOB SERPL: 0.7 {RATIO} (ref 1.2–3.5)
ALP SERPL-CCNC: 80 U/L (ref 50–136)
ALT SERPL-CCNC: 17 U/L (ref 12–65)
ANION GAP SERPL CALC-SCNC: 6 MMOL/L (ref 7–16)
AST SERPL-CCNC: 15 U/L (ref 15–37)
BASOPHILS # BLD: 0 K/UL (ref 0–0.2)
BASOPHILS NFR BLD: 0 % (ref 0–2)
BILIRUB SERPL-MCNC: 0.2 MG/DL (ref 0.2–1.1)
BUN SERPL-MCNC: 19 MG/DL (ref 8–23)
CALCIUM SERPL-MCNC: 8.2 MG/DL (ref 8.3–10.4)
CHLORIDE SERPL-SCNC: 105 MMOL/L (ref 98–107)
CO2 SERPL-SCNC: 29 MMOL/L (ref 21–32)
CREAT SERPL-MCNC: 0.64 MG/DL (ref 0.6–1)
DIFFERENTIAL METHOD BLD: ABNORMAL
EOSINOPHIL # BLD: 0 K/UL (ref 0–0.8)
EOSINOPHIL NFR BLD: 0 % (ref 0.5–7.8)
ERYTHROCYTE [DISTWIDTH] IN BLOOD BY AUTOMATED COUNT: 13 % (ref 11.9–14.6)
GLOBULIN SER CALC-MCNC: 4 G/DL (ref 2.3–3.5)
GLUCOSE BLD STRIP.AUTO-MCNC: 229 MG/DL (ref 65–100)
GLUCOSE BLD STRIP.AUTO-MCNC: 248 MG/DL (ref 65–100)
GLUCOSE BLD STRIP.AUTO-MCNC: 263 MG/DL (ref 65–100)
GLUCOSE BLD STRIP.AUTO-MCNC: 314 MG/DL (ref 65–100)
GLUCOSE SERPL-MCNC: 229 MG/DL (ref 65–100)
HCT VFR BLD AUTO: 39.1 % (ref 35.8–46.3)
HGB BLD-MCNC: 12.1 G/DL (ref 11.7–15.4)
IMM GRANULOCYTES # BLD AUTO: 0.1 K/UL (ref 0–0.5)
IMM GRANULOCYTES NFR BLD AUTO: 1 % (ref 0–5)
LYMPHOCYTES # BLD: 0.9 K/UL (ref 0.5–4.6)
LYMPHOCYTES NFR BLD: 10 % (ref 13–44)
MAGNESIUM SERPL-MCNC: 1.9 MG/DL (ref 1.8–2.4)
MCH RBC QN AUTO: 29.1 PG (ref 26.1–32.9)
MCHC RBC AUTO-ENTMCNC: 30.9 G/DL (ref 31.4–35)
MCV RBC AUTO: 94 FL (ref 79.6–97.8)
MONOCYTES # BLD: 0.4 K/UL (ref 0.1–1.3)
MONOCYTES NFR BLD: 5 % (ref 4–12)
NEUTS SEG # BLD: 7.3 K/UL (ref 1.7–8.2)
NEUTS SEG NFR BLD: 84 % (ref 43–78)
NRBC # BLD: 0 K/UL (ref 0–0.2)
PHOSPHATE SERPL-MCNC: 2.8 MG/DL (ref 2.3–3.7)
PLATELET # BLD AUTO: 210 K/UL (ref 150–450)
PMV BLD AUTO: 11.1 FL (ref 9.4–12.3)
POTASSIUM SERPL-SCNC: 4.7 MMOL/L (ref 3.5–5.1)
PROT SERPL-MCNC: 6.7 G/DL (ref 6.3–8.2)
RBC # BLD AUTO: 4.16 M/UL (ref 4.05–5.2)
SODIUM SERPL-SCNC: 140 MMOL/L (ref 136–145)
WBC # BLD AUTO: 8.7 K/UL (ref 4.3–11.1)

## 2019-02-07 PROCEDURE — 83735 ASSAY OF MAGNESIUM: CPT

## 2019-02-07 PROCEDURE — 74011250637 HC RX REV CODE- 250/637: Performed by: INTERNAL MEDICINE

## 2019-02-07 PROCEDURE — 74011250637 HC RX REV CODE- 250/637: Performed by: NURSE PRACTITIONER

## 2019-02-07 PROCEDURE — 94760 N-INVAS EAR/PLS OXIMETRY 1: CPT

## 2019-02-07 PROCEDURE — 94640 AIRWAY INHALATION TREATMENT: CPT

## 2019-02-07 PROCEDURE — 85025 COMPLETE CBC W/AUTO DIFF WBC: CPT

## 2019-02-07 PROCEDURE — 74011636637 HC RX REV CODE- 636/637: Performed by: HOSPITALIST

## 2019-02-07 PROCEDURE — 74011636637 HC RX REV CODE- 636/637: Performed by: INTERNAL MEDICINE

## 2019-02-07 PROCEDURE — 99232 SBSQ HOSP IP/OBS MODERATE 35: CPT | Performed by: INTERNAL MEDICINE

## 2019-02-07 PROCEDURE — 80053 COMPREHEN METABOLIC PANEL: CPT

## 2019-02-07 PROCEDURE — 77010033678 HC OXYGEN DAILY

## 2019-02-07 PROCEDURE — 74011250636 HC RX REV CODE- 250/636: Performed by: INTERNAL MEDICINE

## 2019-02-07 PROCEDURE — 74011250637 HC RX REV CODE- 250/637: Performed by: FAMILY MEDICINE

## 2019-02-07 PROCEDURE — 74011000258 HC RX REV CODE- 258: Performed by: INTERNAL MEDICINE

## 2019-02-07 PROCEDURE — 74011000250 HC RX REV CODE- 250: Performed by: INTERNAL MEDICINE

## 2019-02-07 PROCEDURE — 36415 COLL VENOUS BLD VENIPUNCTURE: CPT

## 2019-02-07 PROCEDURE — 65270000029 HC RM PRIVATE

## 2019-02-07 PROCEDURE — 84100 ASSAY OF PHOSPHORUS: CPT

## 2019-02-07 PROCEDURE — 74011250637 HC RX REV CODE- 250/637: Performed by: HOSPITALIST

## 2019-02-07 PROCEDURE — 82962 GLUCOSE BLOOD TEST: CPT

## 2019-02-07 RX ORDER — INSULIN LISPRO 100 [IU]/ML
30 INJECTION, SOLUTION INTRAVENOUS; SUBCUTANEOUS
Status: DISCONTINUED | OUTPATIENT
Start: 2019-02-07 | End: 2019-02-09 | Stop reason: HOSPADM

## 2019-02-07 RX ORDER — PREDNISONE 20 MG/1
40 TABLET ORAL 2 TIMES DAILY WITH MEALS
Status: DISCONTINUED | OUTPATIENT
Start: 2019-02-07 | End: 2019-02-09 | Stop reason: HOSPADM

## 2019-02-07 RX ORDER — DOXYCYCLINE 100 MG/1
100 CAPSULE ORAL EVERY 12 HOURS
Status: DISCONTINUED | OUTPATIENT
Start: 2019-02-07 | End: 2019-02-09 | Stop reason: HOSPADM

## 2019-02-07 RX ORDER — LOPERAMIDE HYDROCHLORIDE 2 MG/1
4 CAPSULE ORAL ONCE
Status: COMPLETED | OUTPATIENT
Start: 2019-02-07 | End: 2019-02-07

## 2019-02-07 RX ADMIN — INSULIN LISPRO 6 UNITS: 100 INJECTION, SOLUTION INTRAVENOUS; SUBCUTANEOUS at 06:02

## 2019-02-07 RX ADMIN — INSULIN LISPRO 10 UNITS: 100 INJECTION, SOLUTION INTRAVENOUS; SUBCUTANEOUS at 06:00

## 2019-02-07 RX ADMIN — ALPRAZOLAM 2 MG: 0.5 TABLET ORAL at 16:58

## 2019-02-07 RX ADMIN — LOSARTAN POTASSIUM 25 MG: 50 TABLET ORAL at 09:18

## 2019-02-07 RX ADMIN — CLOPIDOGREL 75 MG: 75 TABLET, FILM COATED ORAL at 09:18

## 2019-02-07 RX ADMIN — METOPROLOL TARTRATE 25 MG: 25 TABLET ORAL at 17:00

## 2019-02-07 RX ADMIN — DOXYCYCLINE HYCLATE 100 MG: 100 CAPSULE ORAL at 09:19

## 2019-02-07 RX ADMIN — ALBUTEROL SULFATE 2.5 MG: 2.5 SOLUTION RESPIRATORY (INHALATION) at 15:46

## 2019-02-07 RX ADMIN — PANTOPRAZOLE SODIUM 40 MG: 40 TABLET, DELAYED RELEASE ORAL at 05:11

## 2019-02-07 RX ADMIN — TIOTROPIUM BROMIDE 18 MCG: 18 CAPSULE ORAL; RESPIRATORY (INHALATION) at 08:18

## 2019-02-07 RX ADMIN — HYDROCODONE BITARTRATE AND ACETAMINOPHEN 1 TABLET: 5; 325 TABLET ORAL at 00:19

## 2019-02-07 RX ADMIN — ALPRAZOLAM 2 MG: 0.5 TABLET ORAL at 05:19

## 2019-02-07 RX ADMIN — GUAIFENESIN 1200 MG: 600 TABLET, EXTENDED RELEASE ORAL at 16:59

## 2019-02-07 RX ADMIN — Medication 10 ML: at 23:06

## 2019-02-07 RX ADMIN — METHYLPREDNISOLONE SODIUM SUCCINATE 40 MG: 125 INJECTION, POWDER, FOR SOLUTION INTRAMUSCULAR; INTRAVENOUS at 09:19

## 2019-02-07 RX ADMIN — INSULIN LISPRO 6 UNITS: 100 INJECTION, SOLUTION INTRAVENOUS; SUBCUTANEOUS at 11:51

## 2019-02-07 RX ADMIN — GUAIFENESIN 1200 MG: 600 TABLET, EXTENDED RELEASE ORAL at 09:18

## 2019-02-07 RX ADMIN — INSULIN GLARGINE 80 UNITS: 100 INJECTION, SOLUTION SUBCUTANEOUS at 09:22

## 2019-02-07 RX ADMIN — ALPRAZOLAM 2 MG: 0.5 TABLET ORAL at 23:03

## 2019-02-07 RX ADMIN — LOPERAMIDE HYDROCHLORIDE 4 MG: 2 CAPSULE ORAL at 16:58

## 2019-02-07 RX ADMIN — METOPROLOL TARTRATE 25 MG: 25 TABLET ORAL at 09:18

## 2019-02-07 RX ADMIN — ENOXAPARIN SODIUM 40 MG: 40 INJECTION SUBCUTANEOUS at 23:03

## 2019-02-07 RX ADMIN — OSELTAMIVIR PHOSPHATE 75 MG: 75 CAPSULE ORAL at 23:03

## 2019-02-07 RX ADMIN — ESCITALOPRAM OXALATE 20 MG: 10 TABLET ORAL at 09:18

## 2019-02-07 RX ADMIN — ALBUTEROL SULFATE 2.5 MG: 2.5 SOLUTION RESPIRATORY (INHALATION) at 20:26

## 2019-02-07 RX ADMIN — Medication 10 ML: at 05:12

## 2019-02-07 RX ADMIN — PREDNISONE 40 MG: 20 TABLET ORAL at 16:58

## 2019-02-07 RX ADMIN — INSULIN LISPRO 30 UNITS: 100 INJECTION, SOLUTION INTRAVENOUS; SUBCUTANEOUS at 17:02

## 2019-02-07 RX ADMIN — ALBUTEROL SULFATE 2.5 MG: 2.5 SOLUTION RESPIRATORY (INHALATION) at 08:17

## 2019-02-07 RX ADMIN — LEVOTHYROXINE SODIUM 100 MCG: 100 TABLET ORAL at 05:11

## 2019-02-07 RX ADMIN — DOXYCYCLINE 100 MG: 100 INJECTION, POWDER, LYOPHILIZED, FOR SOLUTION INTRAVENOUS at 00:12

## 2019-02-07 RX ADMIN — HYDROCODONE BITARTRATE AND ACETAMINOPHEN 1 TABLET: 5; 325 TABLET ORAL at 09:18

## 2019-02-07 RX ADMIN — INSULIN LISPRO 12 UNITS: 100 INJECTION, SOLUTION INTRAVENOUS; SUBCUTANEOUS at 17:02

## 2019-02-07 RX ADMIN — ASPIRIN 81 MG: 81 TABLET, COATED ORAL at 09:19

## 2019-02-07 RX ADMIN — OSELTAMIVIR PHOSPHATE 75 MG: 75 CAPSULE ORAL at 09:18

## 2019-02-07 RX ADMIN — ALBUTEROL SULFATE 2.5 MG: 2.5 SOLUTION RESPIRATORY (INHALATION) at 11:22

## 2019-02-07 RX ADMIN — DOXYCYCLINE HYCLATE 100 MG: 100 CAPSULE ORAL at 23:03

## 2019-02-07 RX ADMIN — METHYLPREDNISOLONE SODIUM SUCCINATE 40 MG: 125 INJECTION, POWDER, FOR SOLUTION INTRAMUSCULAR; INTRAVENOUS at 00:12

## 2019-02-07 RX ADMIN — TETRAHYDROZOLINE HCL: 0.5 EYE DROP SOLUTION at 23:06

## 2019-02-07 RX ADMIN — INSULIN LISPRO 9 UNITS: 100 INJECTION, SOLUTION INTRAVENOUS; SUBCUTANEOUS at 23:04

## 2019-02-07 RX ADMIN — Medication 10 ML: at 12:00

## 2019-02-07 RX ADMIN — INSULIN LISPRO 30 UNITS: 100 INJECTION, SOLUTION INTRAVENOUS; SUBCUTANEOUS at 11:51

## 2019-02-07 NOTE — PROGRESS NOTES
SBAR received from [facility]. rr are even and unlabored pt is resting. No distress noted. O2 in place will continue to monitor.

## 2019-02-07 NOTE — PROGRESS NOTES
Problem: Interdisciplinary Rounds Goal: Interdisciplinary Rounds Interdisciplinary team rounds were held 2/7/2019 with the following team members:Care Management, Physical Therapy, Physician and Clinical Coordinator and the patient. Plan of care discussed. See clinical pathway and/or care plan for interventions and desired outcomes.

## 2019-02-07 NOTE — PROGRESS NOTES
[facility] Admission Date: 2/4/2019 Daily Progress Note: 2/7/2019 The patient's chart is reviewed and the patient is discussed with the staff. 
 
70 y. o.CF presents with progressive shortness of breath for 2 days, subjective fevers and productive cough with yellow sputum.   Multiple family members are very concerned about her living arrangements--they feel she is living in [facility] and with other family members that use drugs. Chronic medical:  DM2, hypothyroidism, GERD, IBS, HTN, HL, CAD with hx stents, JEREMÍAS on O2 at 3 lpm with sleep (does not wear CPAP), COPD with rescue inhaler only, anxiety, depression, seizure, and tobacco abuse (~50 pack year history). Recurrent lice. In the ER Influenza A positive, Tamilful added, Na 130, glucose 373.  Has been placed on BIPAP, temp 99.3. Admitted to the ICU. Subjective:  
 
Patient seen resting quietly in bed today. No c/o pain or distress voiced. Current Facility-Administered Medications Medication Dose Route Frequency  doxycycline (VIBRAMYCIN) capsule 100 mg  100 mg Oral Q12H  predniSONE (DELTASONE) tablet 40 mg  40 mg Oral BID WITH MEALS  insulin lispro (HUMALOG) injection 30 Units  30 Units SubCUTAneous TIDAC  
 HYDROcodone-acetaminophen (NORCO) 5-325 mg per tablet 1 Tab  1 Tab Oral Q4H PRN  
 insulin glargine (LANTUS) injection 80 Units  80 Units SubCUTAneous DAILY  pantoprazole (PROTONIX) tablet 40 mg  40 mg Oral ACB  enoxaparin (LOVENOX) injection 40 mg  40 mg SubCUTAneous Q24H  
 insulin lispro (HUMALOG) injection   SubCUTAneous AC&HS  
 dextrose 40% (GLUTOSE) oral gel 1 Tube  15 g Oral PRN  
 glucagon (GLUCAGEN) injection 1 mg  1 mg IntraMUSCular PRN  
 dextrose (D50W) injection syrg 12.5-25 g  25-50 mL IntraVENous PRN  
 influenza vaccine 2018-19 (4 yrs+)(PF) (FLUCELVAX QUAD) injection 0.5 mL  0.5 mL IntraMUSCular PRIOR TO DISCHARGE  
  tuberculin injection 5 Units  5 Units IntraDERMal ONCE  
 ALPRAZolam (XANAX) tablet 2 mg  2 mg Oral Q4H PRN  
 nicotine (NICODERM CQ) 21 mg/24 hr patch 1 Patch  1 Patch TransDERmal DAILY  oseltamivir (TAMIFLU) capsule 75 mg  75 mg Oral Q12H  aspirin delayed-release tablet 81 mg  81 mg Oral DAILY  clopidogrel (PLAVIX) tablet 75 mg  75 mg Oral DAILY  escitalopram oxalate (LEXAPRO) tablet 20 mg  20 mg Oral DAILY  guaiFENesin ER (MUCINEX) tablet 1,200 mg  1,200 mg Oral BID  levothyroxine (SYNTHROID) tablet 100 mcg  100 mcg Oral ACB  losartan (COZAAR) tablet 25 mg  25 mg Oral DAILY  metoprolol tartrate (LOPRESSOR) tablet 25 mg  25 mg Oral BID  sodium chloride (NS) flush 5-40 mL  5-40 mL IntraVENous Q8H  
 sodium chloride (NS) flush 5-40 mL  5-40 mL IntraVENous PRN  
 albuterol (PROVENTIL VENTOLIN) nebulizer solution 2.5 mg  2.5 mg Nebulization Q4H RT  
 tiotropium (SPIRIVA) inhalation capsule 18 mcg  1 Cap Inhalation DAILY  acetaminophen (TYLENOL) tablet 650 mg  650 mg Oral Q4H PRN  
 bisacodyl (DULCOLAX) suppository 10 mg  10 mg Rectal DAILY PRN Review of Systems Constitutional: negative for fever, chills, sweats Cardiovascular: negative for chest pain, palpitations, syncope, edema Gastrointestinal:  negative for dysphagia, reflux, vomiting, diarrhea, abdominal pain, or melena Neurologic:  negative for focal weakness, numbness, headache Objective:  
 
Vitals:  
 02/07/19 3732 02/07/19 0423 02/07/19 5259 02/07/19 0316 BP: 143/60  123/62 Pulse: (!) 57  (!) 55 Resp: 18  18 Temp: 98.3 °F (36.8 °C)  98 °F (36.7 °C) SpO2: 94% 93% 93% 96% Weight:      
Height:      
 
Intake and Output:  
02/05 1901 - 02/07 0700 In: 840 [P.O.:840] Out: 2300 [UNHRF:5015] No intake/output data recorded. Physical Exam:  
Constitution:  the patient is well developed and in no acute distress EENMT:  Sclera clear, pupils equal, oral mucosa moist 
 Respiratory: Few crackles, on 3L O2 NC Cardiovascular:  RRR without M,G,R 
Gastrointestinal: soft and non-tender; with positive bowel sounds. Musculoskeletal: warm without cyanosis. There is trace lower leg edema. Skin:  no jaundice or rashes, no wounds Neurologic: no gross neuro deficits Psychiatric:  alert and oriented x 3, follows commands CXR:  None today LAB Recent Labs 02/07/19 
0543 02/06/19 
2018 02/06/19 
1606 02/06/19 
1136 02/06/19 
0543 GLUCPOC 229* 315* 389* 287* 303* Recent Labs 02/07/19 
8825 02/05/19 
0747 02/04/19 
1225 WBC 8.7 6.0 7.8 HGB 12.1 12.6 13.0 HCT 39.1 39.5 40.8  182 178 Recent Labs 02/07/19 
5798 02/06/19 
1810 02/05/19 
1021 02/05/19 
0747 02/05/19 
0420  02/04/19 
2200  02/04/19 
1225   --   --  139 139   < >  --   --  130*  
K 4.7  --   --  4.6 4.2   < >  --   --  4.3   --   --  105 104   < >  --   --  95* CO2 29  --   --  27 29   < >  --   --  29  
*  --   --  243* 282*   < >  --   --  373* BUN 19  --   --  21 19   < >  --   --  11  
CREA 0.64  --   --  0.91 0.92   < >  --   --  0.99  
MG 1.9 2.2 2.5* 1.8 1.6*  --  1.7*   < >  --   
CA 8.2*  --   --  8.3 8.2*   < >  --   --  8.4 PHOS 2.8  --   --   --   --   --  4.3*  --  3.3 ALB 2.7*  --   --  2.8*  --   --   --   --  3.3 TBILI 0.2  --   --  0.3  --   --   --   --  0.5 ALT 17  --   --  22  --   --   --   --  27 SGOT 15  --   --  24  --   --   --   --  40*  
 < > = values in this interval not displayed. Recent Labs 02/04/19 
1843 02/04/19 
1356 PHI 7.245* 7.248* PCO2I 58.8* 58.8*  
PO2I 96 73* HCO3I 25.5 25.6 No results for input(s): LCAD, LAC in the last 72 hours. Assessment:  (Medical Decision Making) Hospital Problems  Date Reviewed: 2/7/2019 Codes Class Noted POA  
 COPD exacerbation ([facility] ICD-10-CM: J44.1 ICD-9-CM: 491.21  2/6/2019 Unknown * (Principal) Acute on chronic respiratory failure with hypoxia and hypercapnia (HCC) ICD-10-CM: J96.21, J96.22 
ICD-9-CM: 518.84, 786.09, 799.02  2/4/2019 Yes Hyponatremia ICD-10-CM: E87.1 ICD-9-CM: 276.1  2/4/2019 Yes Influenza A (H5N1) ICD-10-CM: J09. X2 
ICD-9-CM: 488.02  2/4/2019 Yes JEREMÍAS (obstructive sleep apnea)- intolerant of CPAP ICD-10-CM: G47.33 
ICD-9-CM: 327.23  2/4/2019 Unknown Acute on chronic respiratory failure (HCC) ICD-10-CM: J96.20 ICD-9-CM: 518.84  2/4/2019 Unknown Morbid obesity ([address] (Chronic) ICD-10-CM: E66.01 
ICD-9-CM: 278.01  4/17/2018 Yes DM (diabetes mellitus) w/o complication (Chronic) [facility]-94-MB: E11.9 ICD-9-CM: 250.00  4/15/2018 Yes COPD (chronic obstructive pulmonary disease) (HCC) (Chronic) ICD-10-CM: J44.9 ICD-9-CM: 209  4/15/2018 Yes Plan:  (Medical Decision Making) --Continue Tamiflu 
--Continue Doxycycline day 4, blood cultures X 2 negative X 3 days (preliminary) --Continue albuterol nebulizers and Spiriva 
--Wean O2 as tolerated More than 50% of the time documented was spent in face-to-face contact with the patient and in the care of the patient on the floor/unit where the patient is located. Maurisio Diaz NP Lungs:   Diffuse wheezes Heart:  RRR with no Murmur/Rubs/Gallops Additional Comments:  Continue tamiflu and doxy, prednisone 40 bid I have spoken with and examined the patient. I agree with the above assessment and plan as documented.  
 
Devante Linares MD

## 2019-02-07 NOTE — PROGRESS NOTES
Report given to April RN. Respirations even and unlabored on 5 liters nasal cannula. Call light is in reach. Safety measures are in place. No needs noted or communicated at this time.

## 2019-02-07 NOTE — PROGRESS NOTES
Progress Note Patient: Francesco Smith               Sex: female          MRN: [id number] YOB: 1950      Age:  70 y.o. PCP:  Miguelito Bagley MD 
Treatment Team: Attending Provider: Silvano Her MD; Utilization Review: Mira Alves RN; Care Manager: Karine Snyder, KATHIE; Consulting Provider: Tiera Rascon MD; Consulting Provider: Desiree Calderon MD 
 
Admission HPI: 
This is a 68-year-old female with a past medical history of diabetes, hypothyroidism, hypertension, coronary artery disease with a history of stents, hyperlipidemia, obstructive sleep apnea, COPD, anxiety, depression presents to the hospital with the shortness of breath for 2 days, fevers, cough with yellow sputum and currently admitted for influenza A infection, acute hypoxic respiratory failure requiring BiPAP, COPD exacerbation Subjective: Pt was seen this AM, SOb is better, cough is improved, no fevers or chills, no CP, no N/V 
 
 
 
Objective:  
Physical Exam:  
Visit Vitals /62 Pulse (!) 55 Temp 98 °F (36.7 °C) Resp 18 Ht 5' 6\" (1.676 m) Wt 103.7 kg (228 lb 9.9 oz) SpO2 93% Breastfeeding? No  
BMI 36.90 kg/m² Temp (24hrs), Av.2 °F (36.8 °C), Min:97.9 °F (36.6 °C), Max:99 °F (37.2 °C) Oxygen Therapy O2 Sat (%): 93 % (19) Pulse via Oximetry: 59 beats per minute (19) O2 Device: Nasal cannula (19) O2 Flow Rate (L/min): 3 l/min (19) O2 Temperature: 87.8 °F (31 °C) (19 0445) FIO2 (%): 32 % (19 1127) Intake/Output Summary (Last 24 hours) at 2019 9765 Last data filed at 2019 1315 Gross per 24 hour Intake 480 ml Output 800 ml Net -320 ml General:- Conscious, Mild distress Eyes:- No pallor/icterus HENT- Oral Mucosa is Moist, Neck:- Supple, No JVD Lungs- Mild wheezing Heart:- S1 S2 regular Abdomen:- Soft, Positive bowel sounds, NTND, No guarding/rigidity/rebound tend. Extremities:-No pedal edema Neurologic: - AOX3, No acute FND, Psych: - Appropriate mood LAB Recent Results (from the past 24 hour(s)) GLUCOSE, POC Collection Time: 02/06/19 11:36 AM  
Result Value Ref Range Glucose (POC) 287 (H) 65 - 100 mg/dL GLUCOSE, POC Collection Time: 02/06/19  4:06 PM  
Result Value Ref Range Glucose (POC) 389 (H) 65 - 100 mg/dL GLUCOSE, POC Collection Time: 02/06/19  8:18 PM  
Result Value Ref Range Glucose (POC) 315 (H) 65 - 100 mg/dL CBC WITH AUTOMATED DIFF Collection Time: 02/07/19  5:23 AM  
Result Value Ref Range WBC 8.7 4.3 - 11.1 K/uL  
 RBC 4.16 4.05 - 5.2 M/uL  
 HGB 12.1 11.7 - 15.4 g/dL HCT 39.1 35.8 - 46.3 % MCV 94.0 79.6 - 97.8 FL  
 MCH 29.1 26.1 - 32.9 PG  
 MCHC 30.9 (L) 31.4 - 35.0 g/dL  
 RDW 13.0 11.9 - 14.6 % PLATELET 950 183 - 223 K/uL MPV 11.1 9.4 - 12.3 FL ABSOLUTE NRBC 0.00 0.0 - 0.2 K/uL  
 DF AUTOMATED NEUTROPHILS 84 (H) 43 - 78 % LYMPHOCYTES 10 (L) 13 - 44 % MONOCYTES 5 4.0 - 12.0 % EOSINOPHILS 0 (L) 0.5 - 7.8 % BASOPHILS 0 0.0 - 2.0 % IMMATURE GRANULOCYTES 1 0.0 - 5.0 %  
 ABS. NEUTROPHILS 7.3 1.7 - 8.2 K/UL  
 ABS. LYMPHOCYTES 0.9 0.5 - 4.6 K/UL  
 ABS. MONOCYTES 0.4 0.1 - 1.3 K/UL  
 ABS. EOSINOPHILS 0.0 0.0 - 0.8 K/UL  
 ABS. BASOPHILS 0.0 0.0 - 0.2 K/UL  
 ABS. IMM. GRANS. 0.1 0.0 - 0.5 K/UL METABOLIC PANEL, COMPREHENSIVE Collection Time: 02/07/19  5:23 AM  
Result Value Ref Range Sodium 140 136 - 145 mmol/L Potassium 4.7 3.5 - 5.1 mmol/L Chloride 105 98 - 107 mmol/L  
 CO2 29 21 - 32 mmol/L Anion gap 6 (L) 7 - 16 mmol/L Glucose 229 (H) 65 - 100 mg/dL BUN 19 8 - 23 MG/DL Creatinine 0.64 0.6 - 1.0 MG/DL  
 GFR est AA >60 >60 ml/min/1.73m2 GFR est non-AA >60 >60 ml/min/1.73m2 Calcium 8.2 (L) 8.3 - 10.4 MG/DL  Bilirubin, total 0.2 0.2 - 1.1 MG/DL  
 ALT (SGPT) 17 12 - 65 U/L  
 AST (SGOT) 15 15 - 37 U/L Alk. phosphatase 80 50 - 136 U/L Protein, total 6.7 6.3 - 8.2 g/dL Albumin 2.7 (L) 3.2 - 4.6 g/dL Globulin 4.0 (H) 2.3 - 3.5 g/dL A-G Ratio 0.7 (L) 1.2 - 3.5 PHOSPHORUS Collection Time: 02/07/19  5:23 AM  
Result Value Ref Range Phosphorus 2.8 2.3 - 3.7 MG/DL MAGNESIUM Collection Time: 02/07/19  5:23 AM  
Result Value Ref Range Magnesium 1.9 1.8 - 2.4 mg/dL GLUCOSE, POC Collection Time: 02/07/19  5:43 AM  
Result Value Ref Range Glucose (POC) 229 (H) 65 - 100 mg/dL No results found. No results found. All Micro Results Procedure Component Value Units Date/Time CULTURE, BLOOD [[id number]] Collected:  02/04/19 1223 Order Status:  Completed Specimen:  Blood Updated:  02/07/19 0451 Special Requests: --     
  LEFT 
HAND Culture result: NO GROWTH 3 DAYS     
 CULTURE, BLOOD [[id number]] Collected:  02/04/19 1225 Order Status:  Completed Specimen:  Blood Updated:  02/07/19 3303 Special Requests: --     
  LEFT Antecubital 
  
  Culture result: NO GROWTH 3 DAYS INFLUENZA A & B AG (RAPID TEST) [[id number]]  (Abnormal) Collected:  02/04/19 1410 Order Status:  Completed Specimen:  Nasopharyngeal from Nasal washing Updated:  02/04/19 1437 Influenza A Ag POSITIVE Comment: A POSITIVE RESULT MAY OCCUR IN THE ABSENCE OF VIABLE VIRUS Influenza B Ag NEGATIVE Comment: NEGATIVE FOR THE PRESENCE OF INFLUENZA B ANTIGEN 
INFECTION DUE TO INFLUENZA B CANNOT BE RULED OUT. BECAUSE THE ANTIGEN PRESENT IN THE SAMPLE MAY BE BELOW 
THE DETECTION LIMIT OF THE TEST. A NEGATIVE TEST IS PRESUMPTIVE AND IT IS RECOMMENDED THAT THESE RESULTS BE CONFIRMED BY VIRAL CULTURE OR AN FDA-CLEARED INFLUENZA A AND B MOLECULAR ASSAY. Source NASOPHARYNGEAL Current Medications Reviewed Current Facility-Administered Medications:  
  doxycycline (VIBRAMYCIN) capsule 100 mg, 100 mg, Oral, Q12H, David Gibson Remus Frankel, MD 
  HYDROcodone-acetaminophen [facility]) 5-325 mg per tablet 1 Tab, 1 Tab, Oral, Q4H PRN, Darvin Carvajal MD, 1 Tab at 02/07/19 0019 
  insulin lispro (HUMALOG) injection 10 Units, 10 Units, SubCUTAneous, TIDAC, Darvin Carvajal MD, 10 Units at 02/06/19 1639 
  insulin glargine (LANTUS) injection 80 Units, 80 Units, SubCUTAneous, DAILY, Tom Whitehead MD, 80 Units at 02/06/19 2068   pantoprazole (PROTONIX) tablet 40 mg, 40 mg, Oral, ACB, Tom Whitehead MD, 40 mg at 02/07/19 2614   enoxaparin (LOVENOX) injection 40 mg, 40 mg, SubCUTAneous, Q24H, Tom Whitehead MD, 40 mg at 02/06/19 2253   insulin lispro (HUMALOG) injection, , SubCUTAneous, AC&HS, Ryan Yuen MD, 6 Units at 02/07/19 6215   dextrose 40% (GLUTOSE) oral gel 1 Tube, 15 g, Oral, PRN, Tom Whitehead MD 
  glucagon (GLUCAGEN) injection 1 mg, 1 mg, IntraMUSCular, PRN, Tom Whitehead MD 
  dextrose (D50W) injection syrg 12.5-25 g, 25-50 mL, IntraVENous, PRN, Tom Whitehead MD 
  influenza vaccine 2018-19 (4 yrs+)(PF) (FLUCELVAX QUAD) injection 0.5 mL, 0.5 mL, IntraMUSCular, PRIOR TO DISCHARGE, Tom Whitehead MD 
  tuberculin injection 5 Units, 5 Units, IntraDERMal, ONCE, Tom Whitehead MD 
  ALPRAZolam Debi Pushpa) tablet 2 mg, 2 mg, Oral, Q4H PRN, Ryan Yuen MD, 2 mg at 02/07/19 0519 
  nicotine (NICODERM CQ) 21 mg/24 hr patch 1 Patch, 1 Patch, TransDERmal, DAILY, Ryan Yuen MD, 1 Patch at 02/06/19 6135   oseltamivir (TAMIFLU) capsule 75 mg, 75 mg, Oral, Q12H, Lulldiana El, NP, 75 mg at 02/06/19 2252   aspirin delayed-release tablet 81 mg, 81 mg, Oral, DAILY, Chaya Otto MD, 81 mg at 02/06/19 8271   clopidogrel (PLAVIX) tablet 75 mg, 75 mg, Oral, DAILY, Chaya Otto MD, 75 mg at 02/06/19 1336   escitalopram oxalate (LEXAPRO) tablet 20 mg, 20 mg, Oral, DAILY, Chaya Otto MD, 20 mg at 02/06/19 0910 
  guaiFENesin ER (MUCINEX) tablet 1,200 mg, 1,200 mg, Oral, BID, Ze Betts Charly Friend MD, 1,200 mg at 02/06/19 1610   levothyroxine (SYNTHROID) tablet 100 mcg, 100 mcg, Oral, ACB, Anju Palacios MD, 100 mcg at 02/07/19 8722   losartan (COZAAR) tablet 25 mg, 25 mg, Oral, DAILY, Anju Palacios MD, Stopped at 02/06/19 0911 
  metoprolol tartrate (LOPRESSOR) tablet 25 mg, 25 mg, Oral, BID, Anju Palacios MD, 25 mg at 02/06/19 1611 
  sodium chloride (NS) flush 5-40 mL, 5-40 mL, IntraVENous, Q8H, Anju Palacios MD, 10 mL at 02/07/19 5607   sodium chloride (NS) flush 5-40 mL, 5-40 mL, IntraVENous, PRN, Anju Palacios MD 
  albuterol (PROVENTIL VENTOLIN) nebulizer solution 2.5 mg, 2.5 mg, Nebulization, Q4H RT, Anju Palacios MD, Stopped at 02/07/19 0449   tiotropium (SPIRIVA) inhalation capsule 18 mcg, 1 Cap, Inhalation, DAILY, Anju Palacios MD, 18 mcg at 02/06/19 1284   acetaminophen (TYLENOL) tablet 650 mg, 650 mg, Oral, Q4H PRN, Anju Palacios MD 
  bisacodyl (DULCOLAX) suppository 10 mg, 10 mg, Rectal, DAILY PRN, Anju Palacios MD 
  methylPREDNISolone (PF) (Solu-MEDROL) injection 40 mg, 40 mg, IntraVENous, Q12H, Giselle Garg MD, 40 mg at 02/07/19 4146 Assessment/Plan Principal Problem: 
  Acute on chronic respiratory failure with hypoxia and hypercapnia ([facility] (2/4/2019) Active Problems: 
  DM (diabetes mellitus) w/o complication (1/14/9913) COPD (chronic obstructive pulmonary disease) ([facility] (4/15/2018) Morbid obesity ([facility] (4/17/2018) Hyponatremia (2/4/2019) Influenza A (H5N1) (2/4/2019) JEREMÍAS (obstructive sleep apnea)- intolerant of CPAP (2/4/2019) Acute on chronic respiratory failure ([facility] (2/4/2019) COPD exacerbation ([facility] (2/6/2019) #Acute on chronic respiratory failure with hypoxia and hypercapnia #Hyponatremia #Influenza a infection #Uncontrolled diabetes mellitus #COPD exacerbation #Acute bronchitis #Hypothyroidism #Hypertension #Coronary artery disease with a history of stent placement #Anxiety and depression Plan Patient is currently on doxycycline, switch to PO steroids, duo nebs, Tamiflu, hypoxia is improving back on 3L, at home she is on 3 L of oxygen Fingerstick glucoses on the high side, adjusted pre-meal insulin, continue with sliding scale insulin coverage Continue with rest of the home medications on Plavix 75 mg once daily, aspirin 81 mg, Lexapro 20 mg once daily, Synthroid 100 mcg, losartan 25 mcg milligrams once daily, metoprolol 25 minutes twice daily Blood pressure is on the low side we are holding off on blood pressure medications on metoprolol and losartan at home DVT prophylaxis with Lovenox GI prophylaxis with PPI Sylvester Mathews MD 
February 7, 2019

## 2019-02-07 NOTE — PROGRESS NOTES
[facility] OUTREACH NURSE PROGRESS REPORT SUBJECTIVE: Called to assess patient secondary to transfer from critical care. MEWS Score: 2 (02/06/19 2302) Vitals:  
 02/06/19 1938 02/06/19 1947 02/06/19 2302 02/06/19 2329 BP: 130/56  91/54 Pulse: 62  62 Resp: 18  18 Temp: 98 °F (36.7 °C)  99 °F (37.2 °C) SpO2: 96% 98% 93% 92% Weight:      
Height:      
  
LAB DATA: 
 
Recent Labs 02/06/19 
6127 02/05/19 
1021 02/05/19 
0747 02/05/19 
0420 02/05/19 
0013  02/04/19 
2200  02/04/19 
1225 NA  --   --  139 139 134*   < >  --   --  130* K  --   --  4.6 4.2 4.8   < >  --   --  4.3 CL  --   --  105 104 101   < >  --   --  95* CO2  --   --  27 29 28   < >  --   --  29 AGAP  --   --  7 6* 5*   < >  --   --  6*  
GLU  --   --  243* 282* 445*   < >  --   --  373* BUN  --   --  21 19 19   < >  --   --  11  
CREA  --   --  0.91 0.92 1.13*   < >  --   --  0.99 GFRAA  --   --  >60 >60 >60   < >  --   --  >60  
GFRNA  --   --  >60 >60 51*   < >  --   --  59* CA  --   --  8.3 8.2* 7.9*   < >  --   --  8.4 MG 2.2 2.5* 1.8 1.6*  --   --  1.7*   < >  --   
PHOS  --   --   --   --   --   --  4.3*  --  3.3 ALB  --   --  2.8*  --   --   --   --   --  3.3 TP  --   --  6.8  --   --   --   --   --  7.7 GLOB  --   --  4.0*  --   --   --   --   --  4.4* AGRAT  --   --  0.7*  --   --   --   --   --  0.8* ALT  --   --  22  --   --   --   --   --  27  
 < > = values in this interval not displayed. Recent Labs 02/05/19 
0747 02/04/19 
1225 WBC 6.0 7.8 HGB 12.6 13.0 HCT 39.5 40.8  178 OBJECTIVE: On arrival to room, I found patient to be resting in bed. Pain Assessment Pain Intensity 1: (P) 0 (02/06/19 1910) Pain Location 1: Back Pain Intervention(s) 1: Medication (see MAR) Patient Stated Pain Goal: (P) 0 
 
  
ASSESSMENT:  Patient sitting on side of bed, asssisted to bathroom, O2 sat stable with ambulation, 91-95% on 3L NC. States feeling well. Peripheral IV seems to be pulling loose, patient asking for her medication. Primary RN notified. NAD noted at this time. PLAN:  Will continue to follow per outreach protocol.

## 2019-02-07 NOTE — PROGRESS NOTES
Date of Outreach Update: 
Emiliano Riley was seen and assessed. MEWS Score: 1 (02/07/19 0307) Vitals:  
 02/06/19 2302 02/06/19 2329 02/07/19 2732 02/07/19 0423 BP: 91/54  143/60 Pulse: 62  (!) 57 Resp: 18  18 Temp: 99 °F (37.2 °C)  98.3 °F (36.8 °C) SpO2: 93% 92% 94% 93% Weight:      
Height:      
  
 
 Pain Assessment Pain Intensity 1: 0 (02/07/19 0307) Pain Location 1: Back Pain Intervention(s) 1: Medication (see MAR) Patient Stated Pain Goal: 0 Previous Outreach assessment has been reviewed. There have been no significant clinical changes since the completion of the last dated Outreach assessment. Patient sleeping with 5L NC, O2 sat 94%. NAD noted. Will continue to follow up per outreach protocol. Signed By:   Maribeth Hernandez RN   February 7, 2019  6:44 AM

## 2019-02-07 NOTE — PROGRESS NOTES
Discussed patient during IDT rounds. Patient is continuing to progress. Anticipating discharge tomorrow if her condition continues to improve. Case Management will follow for discharge planning. Care Management Interventions PCP Verified by CM: Yes(confirms Marimar as PCP) Mode of Transport at Discharge: Other (see comment) Transition of Care Consult (CM Consult): Discharge Planning Discharge Durable Medical Equipment: (oxygen with [place], nebulizer, walker, cane,) Current Support Network: Relative's Home(lives with son and DIL with their 3 children. ) Confirm Follow Up Transport: Family(family provides transportation) Plan discussed with Pt/Family/Caregiver: Yes Freedom of Choice Offered: Yes [employer] Information Provided?: No 
Discharge Location Discharge Placement: Home

## 2019-02-07 NOTE — PROGRESS NOTES
Pt is in bed resting rr are even and unlabored. Lung sounds are diminished no distress noted. Pt states she has had diarrhea and has requested imodium MD notified. No increased bleeding or bruising noted. Safety measures in place will continue to monitor.

## 2019-02-07 NOTE — PROGRESS NOTES
Problem: Falls - Risk of 
Goal: *Absence of Falls Document Virginia Chopra Fall Risk and appropriate interventions in the flowsheet. Outcome: Progressing Towards Goal 
Fall Risk Interventions: 
  
 
  
 
Medication Interventions: Evaluate medications/consider consulting pharmacy Elimination Interventions: Call light in reach Problem: Pressure Injury - Risk of 
Goal: *Prevention of pressure injury Document Marcelo Scale and appropriate interventions in the flowsheet. Outcome: Progressing Towards Goal 
Pressure Injury Interventions: 
Sensory Interventions: Assess changes in LOC Moisture Interventions: Minimize layers Activity Interventions: Pressure redistribution bed/mattress(bed type) Mobility Interventions: Assess need for specialty bed Nutrition Interventions: Document food/fluid/supplement intake Friction and Shear Interventions: Lift sheet, Minimize layers

## 2019-02-08 PROBLEM — G47.33 OSA (OBSTRUCTIVE SLEEP APNEA): Chronic | Status: ACTIVE | Noted: 2019-02-04

## 2019-02-08 PROBLEM — B85.2 LICE: Status: ACTIVE | Noted: 2019-02-08

## 2019-02-08 PROBLEM — B85.2 LICE: Chronic | Status: ACTIVE | Noted: 2019-02-08

## 2019-02-08 LAB
GLUCOSE BLD STRIP.AUTO-MCNC: 118 MG/DL (ref 65–100)
GLUCOSE BLD STRIP.AUTO-MCNC: 218 MG/DL (ref 65–100)
GLUCOSE BLD STRIP.AUTO-MCNC: 246 MG/DL (ref 65–100)
GLUCOSE BLD STRIP.AUTO-MCNC: 77 MG/DL (ref 65–100)
GLUCOSE BLD STRIP.AUTO-MCNC: 99 MG/DL (ref 65–100)
MM INDURATION POC: NORMAL MM (ref 0–5)
MM INDURATION POC: NORMAL MM (ref 0–5)
PPD POC: NORMAL NEGATIVE
PPD POC: NORMAL NEGATIVE

## 2019-02-08 PROCEDURE — 74011250636 HC RX REV CODE- 250/636: Performed by: INTERNAL MEDICINE

## 2019-02-08 PROCEDURE — 74011250637 HC RX REV CODE- 250/637: Performed by: INTERNAL MEDICINE

## 2019-02-08 PROCEDURE — 94760 N-INVAS EAR/PLS OXIMETRY 1: CPT

## 2019-02-08 PROCEDURE — 82962 GLUCOSE BLOOD TEST: CPT

## 2019-02-08 PROCEDURE — 74011636637 HC RX REV CODE- 636/637: Performed by: INTERNAL MEDICINE

## 2019-02-08 PROCEDURE — 94640 AIRWAY INHALATION TREATMENT: CPT

## 2019-02-08 PROCEDURE — 74011000250 HC RX REV CODE- 250: Performed by: INTERNAL MEDICINE

## 2019-02-08 PROCEDURE — 74011250637 HC RX REV CODE- 250/637: Performed by: NURSE PRACTITIONER

## 2019-02-08 PROCEDURE — 74011250637 HC RX REV CODE- 250/637: Performed by: HOSPITALIST

## 2019-02-08 PROCEDURE — 97530 THERAPEUTIC ACTIVITIES: CPT

## 2019-02-08 PROCEDURE — 99232 SBSQ HOSP IP/OBS MODERATE 35: CPT | Performed by: INTERNAL MEDICINE

## 2019-02-08 PROCEDURE — 65270000029 HC RM PRIVATE

## 2019-02-08 PROCEDURE — 94762 N-INVAS EAR/PLS OXIMTRY CONT: CPT

## 2019-02-08 PROCEDURE — 74011636637 HC RX REV CODE- 636/637: Performed by: HOSPITALIST

## 2019-02-08 RX ORDER — PERMETHRIN 50 MG/G
CREAM TOPICAL
Qty: 60 G | Refills: 0 | Status: SHIPPED | OUTPATIENT
Start: 2019-02-08 | End: 2019-02-08

## 2019-02-08 RX ORDER — OSELTAMIVIR PHOSPHATE 75 MG/1
75 CAPSULE ORAL EVERY 12 HOURS
Qty: 4 CAP | Refills: 0 | Status: SHIPPED | OUTPATIENT
Start: 2019-02-08 | End: 2019-02-10

## 2019-02-08 RX ORDER — PREDNISONE 20 MG/1
TABLET ORAL
Qty: 12 TAB | Refills: 0 | Status: ON HOLD | OUTPATIENT
Start: 2019-02-08 | End: 2019-03-21

## 2019-02-08 RX ORDER — DOXYCYCLINE 100 MG/1
100 CAPSULE ORAL EVERY 12 HOURS
Qty: 4 CAP | Refills: 0 | Status: ON HOLD | OUTPATIENT
Start: 2019-02-08 | End: 2019-03-21

## 2019-02-08 RX ADMIN — ALBUTEROL SULFATE 2.5 MG: 2.5 SOLUTION RESPIRATORY (INHALATION) at 08:00

## 2019-02-08 RX ADMIN — ENOXAPARIN SODIUM 40 MG: 40 INJECTION SUBCUTANEOUS at 22:03

## 2019-02-08 RX ADMIN — Medication 5 ML: at 06:49

## 2019-02-08 RX ADMIN — INSULIN LISPRO 6 UNITS: 100 INJECTION, SOLUTION INTRAVENOUS; SUBCUTANEOUS at 16:30

## 2019-02-08 RX ADMIN — HYDROCODONE BITARTRATE AND ACETAMINOPHEN 1 TABLET: 5; 325 TABLET ORAL at 01:34

## 2019-02-08 RX ADMIN — ALPRAZOLAM 2 MG: 0.5 TABLET ORAL at 22:02

## 2019-02-08 RX ADMIN — INSULIN GLARGINE 80 UNITS: 100 INJECTION, SOLUTION SUBCUTANEOUS at 09:33

## 2019-02-08 RX ADMIN — GUAIFENESIN 1200 MG: 600 TABLET, EXTENDED RELEASE ORAL at 09:30

## 2019-02-08 RX ADMIN — CLOPIDOGREL 75 MG: 75 TABLET, FILM COATED ORAL at 09:29

## 2019-02-08 RX ADMIN — METOPROLOL TARTRATE 25 MG: 25 TABLET ORAL at 09:30

## 2019-02-08 RX ADMIN — ALBUTEROL SULFATE 2.5 MG: 2.5 SOLUTION RESPIRATORY (INHALATION) at 23:16

## 2019-02-08 RX ADMIN — DOXYCYCLINE HYCLATE 100 MG: 100 CAPSULE ORAL at 09:31

## 2019-02-08 RX ADMIN — ALPRAZOLAM 2 MG: 0.5 TABLET ORAL at 11:16

## 2019-02-08 RX ADMIN — Medication 10 ML: at 22:04

## 2019-02-08 RX ADMIN — PREDNISONE 40 MG: 20 TABLET ORAL at 09:30

## 2019-02-08 RX ADMIN — OSELTAMIVIR PHOSPHATE 75 MG: 75 CAPSULE ORAL at 09:31

## 2019-02-08 RX ADMIN — ALBUTEROL SULFATE 2.5 MG: 2.5 SOLUTION RESPIRATORY (INHALATION) at 19:46

## 2019-02-08 RX ADMIN — LEVOTHYROXINE SODIUM 100 MCG: 100 TABLET ORAL at 06:48

## 2019-02-08 RX ADMIN — INSULIN LISPRO 6 UNITS: 100 INJECTION, SOLUTION INTRAVENOUS; SUBCUTANEOUS at 22:04

## 2019-02-08 RX ADMIN — PREDNISONE 40 MG: 20 TABLET ORAL at 17:58

## 2019-02-08 RX ADMIN — GUAIFENESIN 1200 MG: 600 TABLET, EXTENDED RELEASE ORAL at 17:58

## 2019-02-08 RX ADMIN — ALBUTEROL SULFATE 2.5 MG: 2.5 SOLUTION RESPIRATORY (INHALATION) at 11:35

## 2019-02-08 RX ADMIN — ALBUTEROL SULFATE 2.5 MG: 2.5 SOLUTION RESPIRATORY (INHALATION) at 16:00

## 2019-02-08 RX ADMIN — DOXYCYCLINE HYCLATE 100 MG: 100 CAPSULE ORAL at 22:03

## 2019-02-08 RX ADMIN — Medication 10 ML: at 14:00

## 2019-02-08 RX ADMIN — ESCITALOPRAM OXALATE 20 MG: 10 TABLET ORAL at 09:30

## 2019-02-08 RX ADMIN — OSELTAMIVIR PHOSPHATE 75 MG: 75 CAPSULE ORAL at 22:03

## 2019-02-08 RX ADMIN — PANTOPRAZOLE SODIUM 40 MG: 40 TABLET, DELAYED RELEASE ORAL at 06:48

## 2019-02-08 RX ADMIN — ASPIRIN 81 MG: 81 TABLET, COATED ORAL at 09:30

## 2019-02-08 NOTE — PROGRESS NOTES
Consult due to lice, substance abusers living in household, and lack of running water noted. Unfortunately, none of these fall under the abilities of hospital Case Management to address. The lice are a medical/nursing issue that can be addressed while in the hospital. If her house is infested, there are no known outside resources that cover extermination. Patient is well covered for medications, so if an Rx is needed for lice, she will be able to get that at the pharmacy. Unless there is known abuse of a vulnerable adult, there are no services that will address substance abusers living in a household. If the patient is reporting some sort of physical abuse, psychological abuse, or neglect, the licensed professional to whom she reports it is required by law to make a report to Adult Protective Services. Otherwise, if she is capable of making her own decisions, it is legally her responsibility to make any change to her living arrangements. As far as her running water goes, she can contact the Department of  or water company for possible resources to assist with payment of her water bill after discharge. The hospital has no resources to cover utility bills. Care Management Interventions PCP Verified by CM: Yes(confirms Marimar as PCP) Mode of Transport at Discharge: Other (see comment) Transition of Care Consult (CM Consult): Discharge Planning Discharge Durable Medical Equipment: (oxygen with [place], nebulizer, walker, cane,) Current Support Network: Relative's Home(lives with son and DIL with their 3 children. ) Confirm Follow Up Transport: Family(family provides transportation) Plan discussed with Pt/Family/Caregiver: Yes Freedom of Choice Offered: Yes [employer] Information Provided?: No 
Discharge Location Discharge Placement: Home

## 2019-02-08 NOTE — PROGRESS NOTES
Problem: Mobility Impaired (Adult and Pediatric) Goal: *Acute Goals and Plan of Care (Insert Text) LTG: 
(1.)Ms. Pickering will move from supine to sit and sit to supine , scoot up and down and roll side to side in bed with INDEPENDENCE within 7 treatment day(s). (2.)Ms. Pickering will transfer from bed to chair and chair to bed with INDEPENDENCE using the least restrictive device within 7 treatment day(s). (3.)Ms. Pickering will ambulate with MODIFIED INDEPENDENCE for 250 feet with the least restrictive device within 7 treatment day(s). (4.)Ms. Pickering will participate in therapeutic activity/exercises x 23 minutes for increased activity tolerance within 7 days. ________________________________________________________________________________________________ PHYSICAL THERAPY: Initial Assessment and AM 2/8/2019 INPATIENT:   
Payor: [employer] / Plan: [id number] / Product Type: [employer] /   
  
NAME/AGE/GENDER: Lashell Ulloa is a 70 y.o. female PRIMARY DIAGNOSIS: Acute on chronic respiratory failure (HCC) [J96.20] Acute on chronic respiratory failure with hypoxia and hypercapnia (HCC) Acute on chronic respiratory failure with hypoxia and hypercapnia (HCC) ICD-10: Treatment Diagnosis:  
 · Generalized Muscle Weakness (M62.81) · Difficulty in walking, Not elsewhere classified (R26.2) Precaution/Allergies: 
Lisinopril and Oxycodone ASSESSMENT:  
 
Ms. Terry Loco presents today in supine agreeable to have therapy. She reports she has been getting up and going to the toilet on her own. She remains on 5L of O2 with her sats at 99% at rest but fluctuates frequently during the treatment. She is very good with her recovery breathing technique but she does tend to talk and hold her breath or breaths very shallow at times.   She sat on the edge of the bed and participated with BLE AROM exercises. Sit to stand was supervision with steady standing balance with BUE support. She ambulated in place 20' x 2 with supervision using the r/walker. Her O2 sats drop during or just after ambulating to as low as 84% but it recovers quickly once seated and performing recovery breathing. She practiced stepping 2 times and talked more about how she just does not feel well despite being able to move well. She states she was able to get treatment last night for her lice and hopes this will work to keep them away. She was pleasant and cooperative with therapy today. It appears from the medical record that she is to be discharged today. This section established at most recent assessment PROBLEM LIST (Impairments causing functional limitations): 1. Decreased Strength 2. Decreased Ambulation Ability/Technique 3. Decreased Balance 4. Decreased Activity Tolerance INTERVENTIONS PLANNED: (Benefits and precautions of physical therapy have been discussed with the patient.) 1. Balance Exercise 2. Bed Mobility 3. Family Education 4. Gait Training 5. Therapeutic Activites 6. Therapeutic Exercise/Strengthening 7. Transfer Training TREATMENT PLAN: Frequency/Duration: 3 times a week for duration of hospital stay Rehabilitation Potential For Stated Goals: Good RECOMMENDED REHABILITATION/EQUIPMENT: (at time of discharge pending progress): Due to the probability of continued deficits (see above) this patient will not likely need continued skilled physical therapy after discharge. Equipment:  
? None at this time HISTORY:  
History of Present Injury/Illness (Reason for Referral): 
See H&P Past Medical History/Comorbidities: Ms. Fareed Abel  has a past medical history of Anxiety, Arthritis, ASCAD - of the native vessel (2/27/2013), Asthma, CAD (coronary artery disease), Cancer ([facility] [address] Chronic pain, Claustrophobia, COPD, Current smoker, Degenerative joint disease, Depression, Diabetes ([address] Diverticulosis, DM (diabetes mellitus) w/o complication (8/14/9907), Dyslipidemia, Fatty liver, Fibromyalgia, GERD (gastroesophageal reflux disease), H/O echocardiogram (01/20/14), HTN (hypertension) - controlled, benign (2/27/2013), Hyperlipemia, Hypertension, Hypothyroidism, IBS (irritable bowel syndrome), Influenza A (H5N1) (2/4/2019), Lipid - hyperlipidemia other unsp dyslipidemia (6/9/2016), Macular degeneration, Mass of kidney, Murmur, Myalgia and myositis, unspecified, Obesity, JEREMÍAS (obstructive sleep apnea)- intolerant of CPAP (2/4/2019), Osteoarthrosis, unspecified whether generalized or localized, unspecified site, Psychiatric disorder, PUD (peptic ulcer disease) (1980), S/P total knee arthroplasty (6/22/2016), Seizures ([address] (12/2011), Sleep apnea, Status post total knee replacement (2/27/2013), Syncope and collapse (6/9/2016), Tobacco use disorder (6/9/2016), Urinary, incontinence, stress female, and Vertigo. She also has no past medical history of Adverse effect of anesthesia, Aneurysm ([address] Arrhythmia, Autoimmune disease ([address] Chronic kidney disease, Coagulation defects, Dementia, Difficult intubation, Endocarditis, Heart failure ([address] Ill-defined condition, Malignant hyperthermia due to anesthesia, Nausea & vomiting, Pseudocholinesterase deficiency, Rheumatic fever, Stroke ([address] Thromboembolus ([address] or Unspecified adverse effect of anesthesia. Ms. Paramjit Miller  has a past surgical history that includes colonoscopy; hx knee arthroscopy (Right); hx carpal tunnel release (Right); hx ankle fracture tx (Right); hx appendectomy; hx knee replacement (Left); hx lap cholecystectomy; pr cardiac surg procedure unlist; hx breast lumpectomy (Bilateral); hx hysterectomy (1979); hx tubal ligation; and hx urological. 
Social History/Living Environment:  
Home Environment: Trailer/mobile home # Steps to Enter: 5 Rails to Enter: Yes Hand Rails : Left One/Two Story Residence: One story Living Alone: No 
Support Systems: Child(keaton), Family member(s) Patient Expects to be Discharged to[de-identified] Private residence Current DME Used/Available at Home: [employer] Mavis, straight, Nile Monique, rollator Tub or Shower Type: Tub/Shower combination Prior Level of Function/Work/Activity: 
Lives with son and family in mobile home with steps to enter and PTA pt was independent with ADLs and house hold ambulation. Uses SPC/rollator for community ambulation. Number of Personal Factors/Comorbidities that affect the Plan of Care: 3+: HIGH COMPLEXITY EXAMINATION:  
Most Recent Physical Functioning:  
Gross Assessment: 
  
         
  
Posture: 
  
Balance: 
Sitting: Intact Sitting - Static: Good (unsupported) Sitting - Dynamic: Good (unsupported) Standing: Impaired Standing - Static: Good Standing - Dynamic : Fair Bed Mobility: 
  
Wheelchair Mobility: 
  
Transfers: 
Sit to Stand: Supervision; Independent Stand to Sit: Supervision; Independent Bed to Chair: Supervision; Independent Gait: 
  
Speed/Coral: Slow;Pace decreased (<100 feet/min) Step Length: Left shortened;Right shortened Distance (ft): 20 Feet (ft)(20' x 2 stepping in place at the bedside) Assistive Device: Walker, rolling Ambulation - Level of Assistance: Supervision Body Structures Involved: 1. Lungs 2. Muscles Body Functions Affected: 1. Respiratory 2. Neuromusculoskeletal 
3. Movement Related Activities and Participation Affected: 1. Mobility 2. Domestic Life 3. Community, Social and [place] Number of elements that affect the Plan of Care: 4+: HIGH COMPLEXITY CLINICAL PRESENTATION:  
Presentation: Stable and uncomplicated: LOW COMPLEXITY CLINICAL DECISION MAKIN [facility] [address] [id number] AM-PAC 6 Clicks Basic Mobility Inpatient Short Form How much difficulty does the patient currently have. .. Unable A Lot A Little None 1.   Turning over in bed (including adjusting bedclothes, sheets and blankets)? [] 1   [] 2   [] 3   [x] 4  
2. Sitting down on and standing up from a chair with arms ( e.g., wheelchair, bedside commode, etc.)   [] 1   [] 2   [] 3   [x] 4  
3. Moving from lying on back to sitting on the side of the bed? [] 1   [] 2   [] 3   [x] 4 How much help from another person does the patient currently need. .. Total A Lot A Little None 4. Moving to and from a bed to a chair (including a wheelchair)? [] 1   [] 2   [x] 3   [] 4  
5. Need to walk in hospital room? [] 1   [] 2   [x] 3   [] 4  
6. Climbing 3-5 steps with a railing? [] 1   [] 2   [x] 3   [] 4  
© 2007, Trustees of [facility] [employer], under license to [employer]. All rights reserved Score:  Initial: 21 Most Recent: X (Date: -- ) Interpretation of Tool:  Represents activities that are increasingly more difficult (i.e. Bed mobility, Transfers, Gait). Medical Necessity:    
· Patient demonstrates good rehab potential due to higher previous functional level. Reason for Services/Other Comments: 
· Patient continues to require skilled intervention due to decreased balance and activity tolerance. Use of outcome tool(s) and clinical judgement create a POC that gives a: Clear prediction of patient's progress: LOW COMPLEXITY  
  
 
 
 
TREATMENT:  
(In addition to Assessment/Re-Assessment sessions the following treatments were rendered) Pre-treatment Symptoms/Complaints:  Coughing some but no other complaints of pain. Pain: Initial:  
Pain Intensity 1: 0  Post Session:  0/10 Therapeutic Activity: (    25 minutes): Therapeutic activities including Bed transfers, Ambulation on level ground and sit to stand and standing balance to improve mobility, strength and balance. Required minimal   to promote static and dynamic balance in standing. Date: 
2/8/19 Date: 
 Date: Activity/Exercise Parameters Parameters Parameters Ankle pumps 2 x 10 Toe ups 2 x 10 Knee extension seated 2 x 10    
 Hip flexion /marching  2 x 10 Braces/Orthotics/Lines/Etc:  
· IV 
· wetzel catheter · O2 Device: Nasal cannula Treatment/Session Assessment:   
· Response to Treatment:  Patient participated but states she just does not feel well. · Interdisciplinary Collaboration:  
o Physical Therapist 
o Registered Nurse · After treatment position/precautions:  
o Supine in bed 
o Bed in low position 
o Call light within reach 
o RN notified. · Compliance with Program/Exercises: Will assess as treatment progresses · Recommendations/Intent for next treatment session: \"Next visit will focus on advancements to more challenging activities and reduction in assistance provided\". Total Treatment Duration: PT Patient Time In/Time Out Time In: 6786 Time Out: 0074 [place], [place]

## 2019-02-08 NOTE — DISCHARGE SUMMARY
Hospitalist Discharge Summary     Admit Date:  2019 12:35 PM   Name:  Becca Pickering   Age:  70 y.o.  :  1950   MRN:  [id number]   PCP:  Leeann Carney MD  Treatment Team: Attending Provider: Brenda Cain MD; Utilization Review: Anamika Abrams RN; Consulting Provider: Vivian Mendoza MD; Care Manager: Lucia Deal RN    Problem List for this Hospitalization:  Hospital Problems as of 2019 Date Reviewed: 2019          Codes Class Noted - Resolved POA    Lice MNG-[id number]: A57.4  ICD-9-CM: 132.9  2019 - Present Yes        COPD exacerbation ([facility] ICD-10-CM: J44.1  ICD-9-CM: 491.21  2019 - Present Yes        * (Principal) Acute on chronic respiratory failure with hypoxia and hypercapnia ([facility] ICD-10-CM: J96.21, J96.22  ICD-9-CM: 518.84, 786.09, 799.02  2019 - Present Yes        Hyponatremia ICD-10-CM: E87.1  ICD-9-CM: 276.1  2019 - Present Yes        Influenza A (H5N1) ICD-10-CM: Barbara Oswald. X2  ICD-9-CM: 488.02  2019 - Present Yes        JEREMÍAS (obstructive sleep apnea)- intolerant of CPAP (Chronic) ICD-10-CM: G47.33  ICD-9-CM: 327.23  2019 - Present Yes        Acute on chronic respiratory failure ([facility] ICD-10-CM: J96.20  ICD-9-CM: 518.84  2019 - Present Yes        Morbid obesity ([facility] (Chronic) ICD-10-CM: E66.01  ICD-9-CM: 278.01  2018 - Present Yes        DM (diabetes mellitus) w/o complication (Chronic) [id number]: E11.9  ICD-9-CM: 250.00  4/15/2018 - Present Yes        COPD (chronic obstructive pulmonary disease) ([facility] (Chronic) ICD-10-CM: J44.9  ICD-9-CM: 496  4/15/2018 - Present Yes                Admission HPI from 2019:     This is a 29-year-old female with a past medical history of diabetes, hypothyroidism, hypertension, coronary artery disease with a history of stents, hyperlipidemia, obstructive sleep apnea, COPD, anxiety, depression presents to the hospital with the shortness of breath for 2 days, fevers, cough with yellow sputum and currently admitted for influenza A infection, acute hypoxic respiratory failure requiring BiPAP, COPD exacerbation        Hospital Course:  's is a 79-year-old female with a past medical history as mentioned above present to the hospital with the shortness of breath for 2 days, fevers, cough with yellow sputum. She was admitted for influenza infection, acute hypoxic respiratory failure requiring BiPAP and COPD exacerbation. Patient was initially admitted to the ICU and later transferred to hospitalist service. Currently she is on oral prednisone has shortness of breath and wheezing is improved. She was given doxycycline, Tamiflu. She is back on her home oxygen of 3 L. We did adjust her insulin regimen while in the hospital but that she will go back on her home dosage at the time of discharge. Blood pressure was in the low side we hold off on her blood pressure medications, given she has a bradycardia issues we are holding off on metoprolol. She will need 2 more days of antibiotics and 2 more days of Tamiflu. Patient is also having issues with head lice, permethrin was ordered at the time of discharge. Overall patient is clinically stable to be discharged home. She will follow-up with her primary care physician and with pulmonary clinic. She is also prescribed Spiriva at the time of discharge. Follow up instructions below. Plan was discussed with patient. All questions answered. Patient was stable at time of discharge and was instructed to call or return if there are any concerns or recurrence of symptoms. Diagnostic Imaging/Tests:   Xr Chest Sngl V    Result Date: 2/5/2019  EXAM: Chest x-ray. INDICATION: Influenza. COMPARISON: Yesterday's study. TECHNIQUE: Single frontal view chest. FINDINGS: The lungs are clear. The cardiac size, mediastinal contour and pulmonary vasculature are normal.  No pneumothorax or pleural effusion is seen. The patient is rotated.      IMPRESSION: No acute process or interval change, allowing for patient rotation. Xr Chest Sngl V    Result Date: 2/4/2019  Portable AP upright chest dated 2/4/2019, 1243 hours Prior exam 4/15/2018 CLINICAL INFORMATION: Cough, wheezing, increasing shortness of breath Mild cardiomegaly. Mediastinum unremarkable. Vascularity does not appear congested. No pulmonary infiltrate or pleural effusion. IMPRESSION: No acute abnormality      Echocardiogram results:  No results found for this visit on 02/04/19. All Micro Results     Procedure Component Value Units Date/Time    CULTURE, BLOOD [[id number]] Collected:  02/04/19 1223    Order Status:  Completed Specimen:  Blood Updated:  02/08/19 0631     Special Requests: --        LEFT  HAND       Culture result: NO GROWTH 4 DAYS       CULTURE, BLOOD [[id number]] Collected:  02/04/19 1225    Order Status:  Completed Specimen:  Blood Updated:  02/08/19 0631     Special Requests: --        LEFT  Antecubital       Culture result: NO GROWTH 4 DAYS       C. DIFFICILE AG & TOXIN A/B [[id number]]     Order Status:  Sent Specimen:  Stool     CULTURE, STOOL [[id number]]     Order Status:  Sent Specimen:  Stool     INFLUENZA A & B AG (RAPID TEST) [[id number]]  (Abnormal) Collected:  02/04/19 1410    Order Status:  Completed Specimen:  Nasopharyngeal from Nasal washing Updated:  02/04/19 1437     Influenza A Ag POSITIVE        Comment: A POSITIVE RESULT MAY OCCUR IN THE ABSENCE OF VIABLE VIRUS        Influenza B Ag NEGATIVE         Comment: NEGATIVE FOR THE PRESENCE OF INFLUENZA B ANTIGEN  INFECTION DUE TO INFLUENZA B CANNOT BE RULED OUT. BECAUSE THE ANTIGEN PRESENT IN THE SAMPLE MAY BE BELOW  THE DETECTION LIMIT OF THE TEST. A NEGATIVE TEST IS PRESUMPTIVE AND IT IS RECOMMENDED THAT THESE RESULTS BE CONFIRMED BY VIRAL CULTURE OR AN FDA-CLEARED INFLUENZA A AND B MOLECULAR ASSAY.           Source NASOPHARYNGEAL             Labs: Results:       BMP, Mg, Phos Recent Labs     02/07/19  0523 02/06/19  0639     -- K 4.7  --      --    CO2 29  --    AGAP 6*  --    BUN 19  --    CREA 0.64  --    CA 8.2*  --    *  --    MG 1.9 2.2   PHOS 2.8  --       CBC Recent Labs     02/07/19  0523   WBC 8.7   RBC 4.16   HGB 12.1   HCT 39.1      GRANS 84*   LYMPH 10*   EOS 0*   MONOS 5   BASOS 0   IG 1   ANEU 7.3   ABL 0.9   TOO 0.0   ABM 0.4   ABB 0.0   AIG 0.1      LFT Recent Labs     02/07/19  0523   SGOT 15   ALT 17   AP 80   TP 6.7   ALB 2.7*   GLOB 4.0*   AGRAT 0.7*      Cardiac Testing Lab Results   Component Value Date/Time    BNP 40 (H) 02/04/2019 12:25 PM     04/14/2018 09:23 PM    Troponin-I, Qt. <0.02 (L) 05/21/2014 12:23 AM    Troponin-I, Qt. <0.02 (L) 05/20/2014 07:37 PM    Troponin-I, Qt. <0.02 (L) 05/20/2014 03:05 PM      Coagulation Tests Lab Results   Component Value Date/Time    Prothrombin time 13.1 04/14/2018 09:24 PM    Prothrombin time 10.6 06/06/2016 10:32 AM    Prothrombin time 10.4 02/11/2014 09:51 AM    INR 1.0 04/14/2018 09:24 PM    INR 1.0 06/06/2016 10:32 AM    INR 1.0 02/11/2014 09:51 AM    aPTT 25.3 06/06/2016 10:32 AM    aPTT 29.1 12/20/2013 09:55 PM    aPTT 30.6 02/11/2013 10:40 AM      A1c Lab Results   Component Value Date/Time    Hemoglobin A1c 10.5 (H) 02/04/2019 10:00 PM    Hemoglobin A1c 8.9 (H) 04/15/2018 01:15 AM    Hemoglobin A1c 8.8 (H) 06/22/2016 04:55 AM      Lipid Panel Lab Results   Component Value Date/Time    Cholesterol, total 142 06/26/2015 10:07 AM    HDL Cholesterol 43 06/26/2015 10:07 AM    LDL, calculated 55.8 06/26/2015 10:07 AM    VLDL, calculated 43.2 (H) 06/26/2015 10:07 AM    Triglyceride 216 (H) 06/26/2015 10:07 AM    CHOL/HDL Ratio 3.3 06/26/2015 10:07 AM      Thyroid Panel Lab Results   Component Value Date/Time    TSH 0.739 04/15/2018 05:39 AM        Most Recent UA Lab Results   Component Value Date/Time    Color YELLOW 02/04/2019 03:42 PM    Appearance CLOUDY 02/04/2019 03:42 PM    Specific gravity 1.020 02/04/2019 03:42 PM    pH (UA) 5.5 02/04/2019 03:42 PM    Protein NEGATIVE  02/04/2019 03:42 PM    Glucose >1,000 02/04/2019 03:42 PM    Ketone NEGATIVE  02/04/2019 03:42 PM    Bilirubin NEGATIVE  02/04/2019 03:42 PM    Blood TRACE (A) 02/04/2019 03:42 PM    Urobilinogen 0.2 02/04/2019 03:42 PM    Nitrites POSITIVE (A) 02/04/2019 03:42 PM    Leukocyte Esterase SMALL (A) 02/04/2019 03:42 PM        Allergies   Allergen Reactions    Lisinopril Swelling     Throat swelling      Oxycodone Rash     Immunization History   Administered Date(s) Administered    Influenza Vaccine 03/01/2018    TB Skin Test (PPD) Intradermal 02/27/2013       All Labs from Last 24 Hrs:  Recent Results (from the past 24 hour(s))   GLUCOSE, POC    Collection Time: 02/07/19  8:37 PM   Result Value Ref Range    Glucose (POC) 263 (H) 65 - 100 mg/dL   GLUCOSE, POC    Collection Time: 02/08/19  5:50 AM   Result Value Ref Range    Glucose (POC) 77 65 - 100 mg/dL   GLUCOSE, POC    Collection Time: 02/08/19  6:21 AM   Result Value Ref Range    Glucose (POC) 118 (H) 65 - 100 mg/dL   GLUCOSE, POC    Collection Time: 02/08/19 11:44 AM   Result Value Ref Range    Glucose (POC) 99 65 - 100 mg/dL   GLUCOSE, POC    Collection Time: 02/08/19  4:27 PM   Result Value Ref Range    Glucose (POC) 218 (H) 65 - 100 mg/dL       Discharge Exam:  Patient Vitals for the past 24 hrs:   Temp Pulse Resp BP SpO2   02/08/19 1600     95 %   02/08/19 1146 98.2 °F (36.8 °C) (!) 51 18 112/53 95 %   02/08/19 1136     100 %   02/08/19 0812 98.1 °F (36.7 °C) (!) 54 16 122/52 97 %   02/08/19 0253 98 °F (36.7 °C) (!) 47 16 135/65 100 %   02/07/19 2308 98.2 °F (36.8 °C) (!) 57 18 158/73 98 %   02/07/19 2026     97 %   02/07/19 1932 98 °F (36.7 °C) (!) 53 18 144/62 90 %     Oxygen Therapy  O2 Sat (%): 95 % (02/08/19 1600)  Pulse via Oximetry: 58 beats per minute (02/08/19 1600)  O2 Device: Nasal cannula (02/08/19 1600)  O2 Flow Rate (L/min): 3 l/min (02/08/19 1600)  O2 Temperature: 87.8 °F (31 °C) (02/06/19 0445)  FIO2 (%): 32 % (02/06/19 1127)    Intake/Output Summary (Last 24 hours) at 2/8/2019 1655  Last data filed at 2/7/2019 1811  Gross per 24 hour   Intake 240 ml   Output    Net 240 ml           General:- Conscious, No acute distress   Lungs- Few kamran of wheezing  Heart:- S1 S2 regular  Abdomen:- Soft, Positive bowel sounds, NTND, No guarding/rigidity/rebound tend. Extremities:-No pedal edema  Neurologic: - AOX3, No acute FND,  Psych: - Appropriate mood      Discharge Info:   Current Discharge Medication List      START taking these medications    Details   doxycycline (VIBRAMYCIN) 100 mg capsule Take 1 Cap by mouth every twelve (12) hours. Qty: 4 Cap, Refills: 0      oseltamivir (TAMIFLU) 75 mg capsule Take 1 Cap by mouth every twelve (12) hours for 2 days. Qty: 4 Cap, Refills: 0      tiotropium (SPIRIVA) 18 mcg inhalation capsule Take 1 Cap by inhalation daily. Qty: 30 Cap, Refills: 0      permethrin (ACTICIN) 5 % topical cream Apply  to affected area now for 1 dose. apply sparingly as directed  Qty: 60 g, Refills: 0         CONTINUE these medications which have CHANGED    Details   predniSONE (DELTASONE) 20 mg tablet Take 2 tabs daily x 2 days, take 1.5 tabs daily x 2 days, take 1 tab daily x 2 days, take 1/2 tab daily x 2 days then stop. Qty: 12 Tab, Refills: 0         CONTINUE these medications which have NOT CHANGED    Details   insulin glargine (LANTUS) 100 unit/mL injection 80 Units by SubCUTAneous route nightly. Indications: TYPE 2 DIABETES MELLITUS      losartan (COZAAR) 25 mg tablet TAKE ONE TABLET BY MOUTH EVERY DAY FOR BLOOD PRESSURE  Qty: 30 Tab, Refills: 11      fenofibrate micronized (LOFIBRA) 200 mg capsule TAKE ONE CAPSULE BY MOUTH EACH NIGHT AT BEDTIME FOR TRIGLYCERIDES AND CHOLESTEROL. Qty: 30 Cap, Refills: 5      OXYGEN-AIR DELIVERY SYSTEMS 2 L by Nasal route as needed (SOB). ALPRAZolam (XANAX) 2 mg tablet Take 0.5 Tabs by mouth three (3) times daily as needed for Anxiety.  Max Daily Amount: 3 mg. Take / use AM day of surgery  per anesthesia protocols. Indications: ANXIETY WITH DEPRESSION  Qty: 1 Tab, Refills: 0    Associated Diagnoses: Acute on chronic respiratory failure with hypoxia and hypercapnia (HCC)      albuterol-ipratropium (DUO-NEB) 2.5 mg-0.5 mg/3 ml nebu 3 mL by Nebulization route every six (6) hours as needed. Qty: 30 Nebule, Refills: 0      fluticasone-salmeterol (ADVAIR) 250-50 mcg/dose diskus inhaler Take 1 Puff by inhalation two (2) times a day. Qty: 1 Inhaler, Refills: 1      guaiFENesin (MUCINEX) 1,200 mg Ta12 ER tablet Take 1,200 mg by mouth two (2) times a day. Indications: COLD SYMPTOMS, Cough      metoprolol tartrate (LOPRESSOR) 25 mg tablet Take 1 Tab by mouth two (2) times a day. Qty: 60 Tab, Refills: 0      niacin ER (NIASPAN) 500 mg tablet TAKE ONE TABLET BY MOUTH ONCE DAILY FOR TRIGLYCERIDES AND CHOLESTEROL. Qty: 30 Tab, Refills: 5      clopidogrel (PLAVIX) 75 mg tab Take 1 Tab by mouth daily. Qty: 90 Tab, Refills: 3      loperamide (IMMODIUM) 2 mg tablet Take 2 mg by mouth four (4) times daily as needed for Diarrhea. pantoprazole (PROTONIX) 40 mg tablet Take 40 mg by mouth two (2) times a day. Take / use AM day of surgery  per anesthesia protocols. Indications: GASTROESOPHAGEAL REFLUX      meclizine (ANTIVERT) 25 mg tablet Take 25 mg by mouth three (3) times daily as needed. Indications: VERTIGO      albuterol (PROAIR HFA) 90 mcg/actuation inhaler Take 2 Puffs by inhalation every four (4) hours as needed. Take / use AM day of surgery  per anesthesia protocols if needed      aspirin 81 mg tablet Take 81 mg by mouth daily. Take / use AM day of surgery  per anesthesia protocols. escitalopram (LEXAPRO) 20 mg tablet Take 20 mg by mouth daily. Take / use AM day of surgery  per anesthesia protocols. Indications: ANXIETY WITH DEPRESSION      nitroglycerin (NITROSTAT) 0.4 mg SL tablet 0.4 mg by SubLINGual route every five (5) minutes as needed.  Take / use AM day of surgery  per anesthesia protocols if needed      levothyroxine (SYNTHROID) 100 mcg tablet Take 100 mcg by mouth Daily (before breakfast). Per anesthesia protocol:instructed to take am of surgery. Indications: HYPOTHYROIDISM      rosuvastatin (CRESTOR) 10 mg tablet Take 10 mg by mouth nightly. Indications: HYPERCHOLESTEROLEMIA               Disposition: home    Activity: Activity as tolerated  Diet: DIET DIABETIC CONSISTENT CARB    Follow-up Information     Follow up With Specialties Details Why Contact Info    Cesilia Minaya MD Internal Medicine   [place] [address]  [phone]              Time spent in patient discharge planning and coordination 45  minutes.     Signed:  Justina Montague MD

## 2019-02-08 NOTE — PROGRESS NOTES
Jeri Ashby Admission Date: 2/4/2019 Daily Progress Note: 2/8/2019 The patient's chart is reviewed and the patient is discussed with the staff. 
 
 
70 y. o.CF presents with progressive shortness of breath for 2 days, subjective fevers and productive cough with yellow sputum.   Multiple family members are very concerned about her living arrangements--they feel she is living in Replaced by [facility] and with other family members that use drugs. Chronic medical:  DM2, hypothyroidism, GERD, IBS, HTN, HL, CAD with hx stents, JEREMÍAS on O2 at 3 lpm with sleep (does not wear CPAP), COPD with rescue inhaler only, anxiety, depression, seizure, and tobacco abuse (~50 pack year history). Recurrent lice. In the ER Influenza A positive, Tamilful added, Na 130, glucose 373.  Has been placed on BIPAP, temp 99.3. Admitted to the ICU. Subjective:  
Pt is doing better -down to 3 L which is baseline. O2 sat reading 100 Current Facility-Administered Medications Medication Dose Route Frequency  doxycycline (VIBRAMYCIN) capsule 100 mg  100 mg Oral Q12H  predniSONE (DELTASONE) tablet 40 mg  40 mg Oral BID WITH MEALS  insulin lispro (HUMALOG) injection 30 Units  30 Units SubCUTAneous TIDAC  
 HYDROcodone-acetaminophen (NORCO) 5-325 mg per tablet 1 Tab  1 Tab Oral Q4H PRN  
 insulin glargine (LANTUS) injection 80 Units  80 Units SubCUTAneous DAILY  pantoprazole (PROTONIX) tablet 40 mg  40 mg Oral ACB  enoxaparin (LOVENOX) injection 40 mg  40 mg SubCUTAneous Q24H  
 insulin lispro (HUMALOG) injection   SubCUTAneous AC&HS  
 dextrose 40% (GLUTOSE) oral gel 1 Tube  15 g Oral PRN  
 glucagon (GLUCAGEN) injection 1 mg  1 mg IntraMUSCular PRN  
 dextrose (D50W) injection syrg 12.5-25 g  25-50 mL IntraVENous PRN  
 influenza vaccine 2018-19 (4 yrs+)(PF) (FLUCELVAX QUAD) injection 0.5 mL  0.5 mL IntraMUSCular PRIOR TO DISCHARGE  ALPRAZolam (XANAX) tablet 2 mg  2 mg Oral Q4H PRN  
  nicotine (NICODERM CQ) 21 mg/24 hr patch 1 Patch  1 Patch TransDERmal DAILY  oseltamivir (TAMIFLU) capsule 75 mg  75 mg Oral Q12H  aspirin delayed-release tablet 81 mg  81 mg Oral DAILY  clopidogrel (PLAVIX) tablet 75 mg  75 mg Oral DAILY  escitalopram oxalate (LEXAPRO) tablet 20 mg  20 mg Oral DAILY  guaiFENesin ER (MUCINEX) tablet 1,200 mg  1,200 mg Oral BID  levothyroxine (SYNTHROID) tablet 100 mcg  100 mcg Oral ACB  losartan (COZAAR) tablet 25 mg  25 mg Oral DAILY  metoprolol tartrate (LOPRESSOR) tablet 25 mg  25 mg Oral BID  sodium chloride (NS) flush 5-40 mL  5-40 mL IntraVENous Q8H  
 sodium chloride (NS) flush 5-40 mL  5-40 mL IntraVENous PRN  
 albuterol (PROVENTIL VENTOLIN) nebulizer solution 2.5 mg  2.5 mg Nebulization Q4H RT  
 tiotropium (SPIRIVA) inhalation capsule 18 mcg  1 Cap Inhalation DAILY  acetaminophen (TYLENOL) tablet 650 mg  650 mg Oral Q4H PRN  
 bisacodyl (DULCOLAX) suppository 10 mg  10 mg Rectal DAILY PRN Review of Systems Constitutional: negative for fever, chills, sweats Cardiovascular: negative for chest pain, palpitations, syncope, edema Gastrointestinal:  negative for dysphagia, reflux, vomiting, diarrhea, abdominal pain, or melena Neurologic:  negative for focal weakness, numbness, headache Objective:  
 
Vitals:  
 02/08/19 0253 02/08/19 8200 02/08/19 1136 02/08/19 1146 BP: 135/65 122/52  112/53 Pulse: (!) 47 (!) 54  (!) 51 Resp: 16 16  18 Temp: 98 °F (36.7 °C) 98.1 °F (36.7 °C)  98.2 °F (36.8 °C) SpO2: 100% 97% 100% 95% Weight:      
Height:      
 
Intake and Output:  
02/06 1901 - 02/08 0700 In: 400 [P.O.:400] Out: - No intake/output data recorded. Physical Exam:  
Constitution:  the patient is well developed and in no acute distress EENMT:  Sclera clear, pupils equal, oral mucosa moist 
Respiratory:    clear Cardiovascular:  RRR without M,G,R 
 Gastrointestinal: soft and non-tender; with positive bowel sounds. Musculoskeletal: warm without cyanosis. There is no lower leg edema. Skin:  no jaundice or rashes, no wounds Neurologic: no gross neuro deficits Psychiatric:  alert and oriented x 3 CXR:  
 
 
LAB Recent Labs 02/08/19 
1144 02/08/19 
5284 02/08/19 
0550 02/07/19 
2037 02/07/19 
1528 GLUCPOC 99 118* 77 263* 314* Recent Labs 02/07/19 
7620 WBC 8.7 HGB 12.1 HCT 39.1  Recent Labs 02/07/19 
5471 02/06/19 
6935   --   
K 4.7  --   
  --   
CO2 29  --   
*  --   
BUN 19  --   
CREA 0.64  --   
MG 1.9 2.2 CA 8.2*  --   
PHOS 2.8  --   
ALB 2.7*  --   
TBILI 0.2  --   
ALT 17  --   
SGOT 15  -- No results for input(s): PH, PCO2, PO2, HCO3, PHI, PCO2I, PO2I, HCO3I in the last 72 hours. No results for input(s): LCAD, LAC in the last 72 hours. Assessment:  (Medical Decision Making) Hospital Problems  Date Reviewed: 2/8/2019 Codes Class Noted POA Lice GLN-88-FJ: K13.4 ICD-9-CM: 132.9  2/8/2019 Yes COPD exacerbation ([facility] ICD-10-CM: J44.1 ICD-9-CM: 491.21  2/6/2019 Yes  
 better * (Principal) Acute on chronic respiratory failure with hypoxia and hypercapnia (HCC) ICD-10-CM: J96.21, J96.22 
ICD-9-CM: 518.84, 786.09, 799.02  2/4/2019 Yes O2 sat adequate on 3 L Hyponatremia ICD-10-CM: E87.1 ICD-9-CM: 276.1  2/4/2019 Yes Influenza A (H5N1) ICD-10-CM: J09. X2 
ICD-9-CM: 488.02  2/4/2019 Yes JEREMÍAS (obstructive sleep apnea)- intolerant of CPAP (Chronic) ICD-10-CM: G47.33 
ICD-9-CM: 327.23  2/4/2019 Yes Acute on chronic respiratory failure (HCC) ICD-10-CM: J96.20 ICD-9-CM: 518.84  2/4/2019 Yes Morbid obesity ([address] (Chronic) ICD-10-CM: E66.01 
ICD-9-CM: 278.01  4/17/2018 Yes DM (diabetes mellitus) w/o complication (Chronic) DDL-27-YJ: E11.9 ICD-9-CM: 250.00  4/15/2018 Yes COPD (chronic obstructive pulmonary disease) (HCC) (Chronic) ICD-10-CM: J44.9 ICD-9-CM: 304  4/15/2018 Yes Plan:  (Medical Decision Making) 1    Home soon 2    Will follow up in office 
-- 
 
More than 50% of the time documented was spent in face-to-face contact with the patient and in the care of the patient on the floor/unit where the patient is located.  
 
Corinne Manzanilla, MD

## 2019-02-08 NOTE — PROGRESS NOTES
02/08/19 1100 Assessment  Type Assessment Type Shift assessment Activity and Safety Activity Level Up ad mary ann Ambulate Ambulate to bathroom Isabel's Egress Test Fail Activity In bed Activity Assistance Partial (one person) Weight Bearing Status WBAT (Weight Bearing as Tolerated) Mode of Transportation Ambulatory Repositioned Head of bed elevated (degrees) Patient Turned Turns self Head of Bed Elevated Self regulated Safety Measures Bed/Chair-Wheels locked; Bed in low position;Call light within reach;Gripper socks; Side rails X2 Psychosocial  
Psychosocial (WDL) WDL Purposeful Interaction Yes Pt Identified Daily Priority Clinical issues (comment) Caring Interventions Reassure Reassure Caring rounds Caritas Process Nurture loving kindness Patient Behaviors Calm; Cooperative Neuro Neurologic State Alert Orientation Level Oriented X4  
LUE Motor Response Purposeful LLE Motor Response Purposeful RUE Motor Response Purposeful RLE Motor Response Purposeful Cognition Appropriate decision making EENT  
EENT (WDL) X Visual Aid Glasses Visual Impairment None Respiratory Respiratory (WDL) X Respiratory Pattern Dyspnea with exertion Breath Sounds Bilateral Coarse Chest/Tracheal Assessment Chest expansion, symmetrical  
Cardiac Cardiac (WDL) WDL  
B/P Outside of Defined Limits No  
Cardiac Regularity Regular Cardiac/Telemetry Monitor On No  
VTE Prophylaxis (Shift Required Documentation) Mechanical VTE Orders No 
(Heparin therapy) Peripheral Vascular Peripheral Vascular (WDL) WDL Abdominal   
Abdominal (WDL) X Bowel Sounds Active Genitourinary Genitourinary (WDL) WDL Urine Assessment Urinary Status Voiding Skin Integumentary Skin Integumentary (WDL) WDL Pressure  Injury Documentation No Pressure Injury Noted-Pressure Ulcer Prevention Initiated Skin Color Appropriate for ethnicity Skin Condition/Temp Warm;Dry Skin Integrity Intact Wound Prevention and Protection Methods Wound Offloading (Prevention Methods) Bed, pressure reduction mattress;Pillows Isolation Precautions Isolation Type Droplet; Enteric Isolation Status  Continue Neuro Neuro (WDL) WDL Pulmonary Toilet Pulmonary Toilet Cough and deep breath Musculoskeletal  
Musculoskeletal (WDL) WDL

## 2019-02-09 VITALS
HEIGHT: 66 IN | HEART RATE: 57 BPM | DIASTOLIC BLOOD PRESSURE: 61 MMHG | WEIGHT: 228.62 LBS | RESPIRATION RATE: 19 BRPM | TEMPERATURE: 97.8 F | OXYGEN SATURATION: 95 % | SYSTOLIC BLOOD PRESSURE: 111 MMHG | BODY MASS INDEX: 36.74 KG/M2

## 2019-02-09 LAB
BACTERIA SPEC CULT: NORMAL
BACTERIA SPEC CULT: NORMAL
GLUCOSE BLD STRIP.AUTO-MCNC: 166 MG/DL (ref 65–100)
SERVICE CMNT-IMP: NORMAL
SERVICE CMNT-IMP: NORMAL

## 2019-02-09 PROCEDURE — 74011250636 HC RX REV CODE- 250/636: Performed by: INTERNAL MEDICINE

## 2019-02-09 PROCEDURE — 74011250637 HC RX REV CODE- 250/637: Performed by: INTERNAL MEDICINE

## 2019-02-09 PROCEDURE — 82962 GLUCOSE BLOOD TEST: CPT

## 2019-02-09 PROCEDURE — 74011250637 HC RX REV CODE- 250/637: Performed by: NURSE PRACTITIONER

## 2019-02-09 PROCEDURE — 74011636637 HC RX REV CODE- 636/637: Performed by: HOSPITALIST

## 2019-02-09 PROCEDURE — 77010033678 HC OXYGEN DAILY

## 2019-02-09 PROCEDURE — 90674 CCIIV4 VAC NO PRSV 0.5 ML IM: CPT | Performed by: INTERNAL MEDICINE

## 2019-02-09 PROCEDURE — 74011000250 HC RX REV CODE- 250: Performed by: INTERNAL MEDICINE

## 2019-02-09 PROCEDURE — 74011250637 HC RX REV CODE- 250/637: Performed by: HOSPITALIST

## 2019-02-09 PROCEDURE — 74011636637 HC RX REV CODE- 636/637: Performed by: INTERNAL MEDICINE

## 2019-02-09 PROCEDURE — 94640 AIRWAY INHALATION TREATMENT: CPT

## 2019-02-09 PROCEDURE — 94760 N-INVAS EAR/PLS OXIMETRY 1: CPT

## 2019-02-09 PROCEDURE — 90471 IMMUNIZATION ADMIN: CPT

## 2019-02-09 RX ADMIN — ALBUTEROL SULFATE 2.5 MG: 2.5 SOLUTION RESPIRATORY (INHALATION) at 08:12

## 2019-02-09 RX ADMIN — PANTOPRAZOLE SODIUM 40 MG: 40 TABLET, DELAYED RELEASE ORAL at 06:10

## 2019-02-09 RX ADMIN — GUAIFENESIN 1200 MG: 600 TABLET, EXTENDED RELEASE ORAL at 09:54

## 2019-02-09 RX ADMIN — LEVOTHYROXINE SODIUM 100 MCG: 100 TABLET ORAL at 06:10

## 2019-02-09 RX ADMIN — ESCITALOPRAM OXALATE 20 MG: 10 TABLET ORAL at 09:54

## 2019-02-09 RX ADMIN — OSELTAMIVIR PHOSPHATE 75 MG: 75 CAPSULE ORAL at 09:54

## 2019-02-09 RX ADMIN — Medication 10 ML: at 06:14

## 2019-02-09 RX ADMIN — INSULIN LISPRO 3 UNITS: 100 INJECTION, SOLUTION INTRAVENOUS; SUBCUTANEOUS at 06:10

## 2019-02-09 RX ADMIN — PREDNISONE 40 MG: 20 TABLET ORAL at 09:54

## 2019-02-09 RX ADMIN — CLOPIDOGREL 75 MG: 75 TABLET, FILM COATED ORAL at 09:54

## 2019-02-09 RX ADMIN — INSULIN LISPRO 30 UNITS: 100 INJECTION, SOLUTION INTRAVENOUS; SUBCUTANEOUS at 09:56

## 2019-02-09 RX ADMIN — METOPROLOL TARTRATE 25 MG: 25 TABLET ORAL at 09:54

## 2019-02-09 RX ADMIN — LOSARTAN POTASSIUM 25 MG: 50 TABLET ORAL at 09:54

## 2019-02-09 RX ADMIN — HYDROCODONE BITARTRATE AND ACETAMINOPHEN 1 TABLET: 5; 325 TABLET ORAL at 06:10

## 2019-02-09 RX ADMIN — DOXYCYCLINE HYCLATE 100 MG: 100 CAPSULE ORAL at 09:54

## 2019-02-09 RX ADMIN — INSULIN GLARGINE 80 UNITS: 100 INJECTION, SOLUTION SUBCUTANEOUS at 09:55

## 2019-02-09 RX ADMIN — INFLUENZA A VIRUS A/SINGAPORE/GP1908/2015 IVR-180 (H1N1) ANTIGEN (MDCK CELL DERIVED, PROPIOLACTONE INACTIVATED), INFLUENZA A VIRUS A/NORTH CAROLINA/04/2016 (H3N2) HEMAGGLUTININ ANTIGEN (MDCK CELL DERIVED, PROPIOLACTONE INACTIVATED), INFLUENZA B VIRUS B/IOWA/06/2017 HEMAGGLUTININ ANTIGEN (MDCK CELL DERIVED, PROPIOLACTONE INACTIVATED), INFLUENZA B VIRUS B/SINGAPORE/INFTT-16-0610/2016 HEMAGGLUTININ ANTIGEN (MDCK CELL DERIVED, PROPIOLACTONE INACTIVATED) 0.5 ML: 15; 15; 15; 15 INJECTION, SUSPENSION INTRAMUSCULAR at 08:50

## 2019-02-09 RX ADMIN — ASPIRIN 81 MG: 81 TABLET, COATED ORAL at 09:54

## 2019-02-09 RX ADMIN — ALPRAZOLAM 2 MG: 0.5 TABLET ORAL at 07:29

## 2019-02-09 RX ADMIN — TIOTROPIUM BROMIDE 18 MCG: 18 CAPSULE ORAL; RESPIRATORY (INHALATION) at 08:12

## 2019-02-09 NOTE — PROGRESS NOTES
RN reassessed patient for feelings of anxiety. Patient verbalizes great improvement in anxiety. RN will continue to monitor.

## 2019-02-09 NOTE — DISCHARGE INSTRUCTIONS
Patient Education        Shortness of Breath: Care Instructions  Your Care Instructions  Shortness of breath has many causes. Sometimes conditions such as anxiety can lead to shortness of breath. Some people get mild shortness of breath when they exercise. Trouble breathing also can be a symptom of a serious problem, such as asthma, lung disease, emphysema, heart problems, and pneumonia. If your shortness of breath continues, you may need tests and treatment. Watch for any changes in your breathing and other symptoms. Follow-up care is a key part of your treatment and safety. Be sure to make and go to all appointments, and call your doctor if you are having problems. It's also a good idea to know your test results and keep a list of the medicines you take. How can you care for yourself at home? · Do not smoke or allow others to smoke around you. If you need help quitting, talk to your doctor about stop-smoking programs and medicines. These can increase your chances of quitting for good. · Get plenty of rest and sleep. · Take your medicines exactly as prescribed. Call your doctor if you think you are having a problem with your medicine. · Find healthy ways to deal with stress. ? Exercise daily. ? Get plenty of sleep. ? Eat regularly and well. When should you call for help? Call 911 anytime you think you may need emergency care. For example, call if:    · You have severe shortness of breath.     · You have symptoms of a heart attack. These may include:  ? Chest pain or pressure, or a strange feeling in the chest.  ? Sweating. ? Shortness of breath. ? Nausea or vomiting. ? Pain, pressure, or a strange feeling in the back, neck, jaw, or upper belly or in one or both shoulders or arms. ? Lightheadedness or sudden weakness. ? A fast or irregular heartbeat. After you call 911, the  may tell you to chew 1 adult-strength or 2 to 4 low-dose aspirin. Wait for an ambulance.  Do not try to drive yourself.    Call your doctor now or seek immediate medical care if:    · Your shortness of breath gets worse or you start to wheeze. Wheezing is a high-pitched sound when you breathe.     · You wake up at night out of breath or have to prop your head up on several pillows to breathe.     · You are short of breath after only light activity or while at rest.    Watch closely for changes in your health, and be sure to contact your doctor if:    · You do not get better over the next 1 to 2 days. Where can you learn more? Go to http://anibal-lamont.info/. Enter S780 in the search box to learn more about \"Shortness of Breath: Care Instructions. \"  Current as of: September 5, 2018  Content Version: 11.9  © 6886-9035 [employer]. Care instructions adapted under license by [employer] (which disclaims liability or warranty for this information). If you have questions about a medical condition or this instruction, always ask your healthcare professional. [place] [address] any warranty or liability for your use of this information. DISCHARGE SUMMARY from Nurse    PATIENT INSTRUCTIONS:    After general anesthesia or intravenous sedation, for 24 hours or while taking prescription Narcotics:  · Limit your activities  · Do not drive and operate hazardous machinery  · Do not make important personal or business decisions  · Do  not drink alcoholic beverages  · If you have not urinated within 8 hours after discharge, please contact your surgeon on call.     Report the following to your surgeon:  · Excessive pain, swelling, redness or odor of or around the surgical area  · Temperature over 100.5  · Nausea and vomiting lasting longer than 4 hours or if unable to take medications  · Any signs of decreased circulation or nerve impairment to extremity: change in color, persistent  numbness, tingling, coldness or increase pain  · Any questions    What to do at Home:  Recommended activity: Activity as tolerated,     If you experience any of the following symptoms fever greater then 100.5,pain unrelieved by medication, increase in shortness of breath, please follow up with primary care doctor. *  Please give a list of your current medications to your Primary Care Provider. *  Please update this list whenever your medications are discontinued, doses are      changed, or new medications (including over-the-counter products) are added. *  Please carry medication information at all times in case of emergency situations. These are general instructions for a healthy lifestyle:    No smoking/ No tobacco products/ Avoid exposure to second hand smoke  Surgeon General's Warning:  Quitting smoking now greatly reduces serious risk to your health. Obesity, smoking, and sedentary lifestyle greatly increases your risk for illness    A healthy diet, regular physical exercise & weight monitoring are important for maintaining a healthy lifestyle    You may be retaining fluid if you have a history of heart failure or if you experience any of the following symptoms:  Weight gain of 3 pounds or more overnight or 5 pounds in a week, increased swelling in our hands or feet or shortness of breath while lying flat in bed. Please call your doctor as soon as you notice any of these symptoms; do not wait until your next office visit. Recognize signs and symptoms of STROKE:    F-face looks uneven    A-arms unable to move or move unevenly    S-speech slurred or non-existent    T-time-call 911 as soon as signs and symptoms begin-DO NOT go       Back to bed or wait to see if you get better-TIME IS BRAIN. Warning Signs of HEART ATTACK     Call 911 if you have these symptoms:   Chest discomfort. Most heart attacks involve discomfort in the center of the chest that lasts more than a few minutes, or that goes away and comes back.  It can feel like uncomfortable pressure, squeezing, fullness, or pain.  Discomfort in other areas of the upper body. Symptoms can include pain or discomfort in one or both arms, the back, neck, jaw, or stomach.  Shortness of breath with or without chest discomfort.  Other signs may include breaking out in a cold sweat, nausea, or lightheadedness. Don't wait more than five minutes to call 911 - MINUTES MATTER! Fast action can save your life. Calling 911 is almost always the fastest way to get lifesaving treatment. Emergency Medical Services staff can begin treatment when they arrive -- up to an hour sooner than if someone gets to the hospital by car. The discharge information has been reviewed with the patient. The patient verbalized understanding. Discharge medications reviewed with the patient and appropriate educational materials and side effects teaching were provided.   ___________________________________________________________________________________________________________________________________

## 2019-02-09 NOTE — PROGRESS NOTES
Gave discharge instructions tot he pt, the pt voices a clear understanding, IV removed x 1 and the primary care nurse id aware.

## 2019-02-09 NOTE — PROGRESS NOTES
Patient verbalizes pain 6/10 in right leg. Patient describes painful cramping. RN administered Norco 5 mg oral tablet.

## 2019-02-09 NOTE — PROGRESS NOTES
Patient verbalizes improvement in pain to 2/10 in right leg. This pain level is tolerable to patient.

## 2019-02-11 ENCOUNTER — PATIENT OUTREACH (OUTPATIENT)
Dept: CASE MANAGEMENT | Age: 69
End: 2019-02-11

## 2019-02-11 NOTE — PROGRESS NOTES
This note will not be viewable in [address] Transition of Care Discharge Follow-up Questionnaire Date/Time of Call: 
 2/11/19 11:23am  
What was the patient hospitalized for? Acute on chronic respiratory failure with hypoxia and hypercapnia Does the patient understand his/her diagnosis and/or treatment and what happened during the hospitalization? Yes, spoke with patient, she states understanding of diagnosis and treatment; and is agreeable to call. Patient states she is doing well, a little weak, finished Tamiflu and will finish doxycycline tonight Did the patient receive discharge instructions? Yes   
CM Assessed Risk for Readmission:  
 
 
Patient stated Risk for Readmission:  
 
 low r/t diagnosis  
 
 
none stated Review any discharge instructions (see discharge instructions/AVS in [facility]). Ask patient if they understand these. Do they have any questions? Reviewed, understanding is stated, no questions at this time Were home services ordered (nursing, PT, OT, ST, etc.)? No  
If so, has the first visit occurred? If not, why? (Assist with coordination of services if necessary.) 
 N/A Was any DME ordered? No 
Has home O2, nebulizer, walker & cane If so, has it been received? If not, why?  (Assist patient in obtaining DME orders &/or equipment if necessary.) N/A Complete a review of all medications (new, continued and discontinued meds per the D/C instructions and medication tab in [address] [place] [address]). Completed START taking: 
doxycycline 100 mg capsule (VIBRAMYCIN) oseltamivir 75 mg capsule (TAMIFLU) 
tiotropium 18 mcg inhalation capsule (SPIRIVA) CHANGE how you take: 
fenofibrate micronized 200 mg capsule (LOFIBRA) ASK how to take: 
permethrin 5 % topical cream (ACTICIN) Were all new prescriptions filled? If not, why?  (Assist patient in obtaining medications if necessary  escalate for CCM &/or SW if ongoing issues are verbalized by pt or anticipated) Yes   
Does the patient understand the purpose and dosing instructions for all medications? (If patient has questions, provide explanation and education.) Yes Does the patient have any problems in performing ADLs? (If patient is unable to perform ADLs  what is the limiting factor(s)? Do they have a support system that can assist? If no support system is present, discuss possible assistance that they may be able to obtain. Escalate for CCM/SW if ongoing issues are verbalized by pt or anticipated) Independent with ADLs, lives with family Does the patient have all follow-up appointments scheduled? 7 day f/up with PCP?  
(f/up with PCP may be w/in 14 days if patient has a f/up with their specialist w/in 7 days) 7-14 day f/up with specialist?  
(or per discharge instructions) If f/up has not been made  what actions has the care coordinator made to accomplish this? Has transportation been arranged? Yes Dr. Andree Baum- patient is calling today for appointment Yes, no transportation needs were identified at this time Any other questions or concerns expressed by the patient? No further needs or concerns identified. Patient states her gratitude for follow up. Contact information for [facility] was given, instructed to call with new questions or concerns. Schedule next appointment with [facility] Coordinator or refer to RN Case Manager/ per the workflow guidelines. When is care coordinators next follow-up call scheduled? If referred for CCM  what RN care manager was the referral assigned? Community Care Coordinator will follow per workflow guidelines. Within 30 days SAB[facility] Call Completed By: David Mesa LPN Community Care Coordinator

## 2019-02-11 NOTE — PROGRESS NOTES
This note will not be viewable in [address] Initial [facility]INE outreach attempt to patient's home/cell number was unsuccessful. Unable to leave message to return call. Will attempt second outreach within 24 hours.

## 2019-03-08 ENCOUNTER — PATIENT OUTREACH (OUTPATIENT)
Dept: CASE MANAGEMENT | Age: 69
End: 2019-03-08

## 2019-03-08 NOTE — PROGRESS NOTES
This note will not be viewable in [address] Transitions of Care  Follow up Outreach Note   Outreach type Phone call: spoke with patient  Home visit:   Date/Time of Outreach: 3/8/19  1:51pm     Has patient attended PCP or specialist follow-up appointments since last contact? What was outcome of appointment? When is next follow-up scheduled? Patient states she is doing well. She reports follow up with NP at her PCP office and she is progressing well and will follow routinely. Review medications. Any medication changes since last outreach? Does patient have any questions or issues related to their medications? None stated    No      Home health active? If yes  any issue? Progress? No      Referrals needed?  (CM, SW, HH, etc. )     No.      Other issues/Miscellaneous? (Transportation, access to meals, ability to perform ADLs, adequate caregiver support, etc.) No other needs or concerns at this time. Patient states her gratitude for follow up. Next Outreach Scheduled?     Graduation from program?   N/A    Yes       Next Steps/Goals (if applicable):   N/A     Outreach completed by:   Darlene Nugent, [address] [place] [address] Coordinator

## 2019-03-21 ENCOUNTER — APPOINTMENT (OUTPATIENT)
Dept: GENERAL RADIOLOGY | Age: 69
DRG: 191 | End: 2019-03-21
Attending: EMERGENCY MEDICINE
Payer: MEDICARE

## 2019-03-21 ENCOUNTER — HOSPITAL ENCOUNTER (INPATIENT)
Age: 69
LOS: 5 days | Discharge: HOME HEALTH | DRG: 191 | End: 2019-03-26
Attending: EMERGENCY MEDICINE | Admitting: INTERNAL MEDICINE
Payer: MEDICARE

## 2019-03-21 DIAGNOSIS — J44.1 COPD EXACERBATION (HCC): Primary | ICD-10-CM

## 2019-03-21 DIAGNOSIS — E66.01 MORBID OBESITY (HCC): Chronic | ICD-10-CM

## 2019-03-21 DIAGNOSIS — J96.21 ACUTE ON CHRONIC RESPIRATORY FAILURE WITH HYPOXIA AND HYPERCAPNIA (HCC): ICD-10-CM

## 2019-03-21 DIAGNOSIS — J96.22 ACUTE ON CHRONIC RESPIRATORY FAILURE WITH HYPOXIA AND HYPERCAPNIA (HCC): ICD-10-CM

## 2019-03-21 DIAGNOSIS — Z72.0 TOBACCO ABUSE: Chronic | ICD-10-CM

## 2019-03-21 DIAGNOSIS — I25.82: ICD-10-CM

## 2019-03-21 DIAGNOSIS — J09.X2 INFLUENZA A (H5N1): ICD-10-CM

## 2019-03-21 DIAGNOSIS — J96.01 ACUTE RESPIRATORY FAILURE WITH HYPOXIA (HCC): ICD-10-CM

## 2019-03-21 DIAGNOSIS — G47.33 OSA (OBSTRUCTIVE SLEEP APNEA): Chronic | ICD-10-CM

## 2019-03-21 LAB
ALBUMIN SERPL-MCNC: 3.1 G/DL (ref 3.2–4.6)
ALBUMIN/GLOB SERPL: 0.7 {RATIO} (ref 1.2–3.5)
ALP SERPL-CCNC: 90 U/L (ref 50–136)
ALT SERPL-CCNC: 10 U/L (ref 12–65)
ANION GAP SERPL CALC-SCNC: 7 MMOL/L (ref 7–16)
ARTERIAL PATENCY WRIST A: YES
AST SERPL-CCNC: 24 U/L (ref 15–37)
ATRIAL RATE: 92 BPM
BASE EXCESS BLD CALC-SCNC: 4 MMOL/L
BASOPHILS # BLD: 0 K/UL (ref 0–0.2)
BASOPHILS NFR BLD: 0 % (ref 0–2)
BDY SITE: ABNORMAL
BILIRUB SERPL-MCNC: 0.7 MG/DL (ref 0.2–1.1)
BNP SERPL-MCNC: 28 PG/ML
BODY TEMPERATURE: 98.6
BUN SERPL-MCNC: 12 MG/DL (ref 8–23)
CALCIUM SERPL-MCNC: 9 MG/DL (ref 8.3–10.4)
CALCULATED P AXIS, ECG09: 43 DEGREES
CALCULATED R AXIS, ECG10: 78 DEGREES
CALCULATED T AXIS, ECG11: 86 DEGREES
CHLORIDE SERPL-SCNC: 100 MMOL/L (ref 98–107)
CO2 BLD-SCNC: 32 MMOL/L
CO2 SERPL-SCNC: 30 MMOL/L (ref 21–32)
COLLECT TIME,HTIME: 1247
CREAT SERPL-MCNC: 0.98 MG/DL (ref 0.6–1)
DIAGNOSIS, 93000: NORMAL
DIFFERENTIAL METHOD BLD: ABNORMAL
EOSINOPHIL # BLD: 0 K/UL (ref 0–0.8)
EOSINOPHIL NFR BLD: 0 % (ref 0.5–7.8)
ERYTHROCYTE [DISTWIDTH] IN BLOOD BY AUTOMATED COUNT: 13.2 % (ref 11.9–14.6)
FLOW RATE ISTAT,IFRATE: 6 L/MIN
GAS FLOW.O2 O2 DELIVERY SYS: ABNORMAL L/MIN
GLOBULIN SER CALC-MCNC: 4.6 G/DL (ref 2.3–3.5)
GLUCOSE BLD STRIP.AUTO-MCNC: 487 MG/DL (ref 65–100)
GLUCOSE SERPL-MCNC: 287 MG/DL (ref 65–100)
HCO3 BLD-SCNC: 30.6 MMOL/L (ref 22–26)
HCT VFR BLD AUTO: 39.5 % (ref 35.8–46.3)
HGB BLD-MCNC: 12.7 G/DL (ref 11.7–15.4)
IMM GRANULOCYTES # BLD AUTO: 0.1 K/UL (ref 0–0.5)
IMM GRANULOCYTES NFR BLD AUTO: 1 % (ref 0–5)
LACTATE BLD-SCNC: 1.46 MMOL/L (ref 0.5–1.9)
LYMPHOCYTES # BLD: 1.7 K/UL (ref 0.5–4.6)
LYMPHOCYTES NFR BLD: 12 % (ref 13–44)
MCH RBC QN AUTO: 28.9 PG (ref 26.1–32.9)
MCHC RBC AUTO-ENTMCNC: 32.2 G/DL (ref 31.4–35)
MCV RBC AUTO: 90 FL (ref 79.6–97.8)
MONOCYTES # BLD: 1.1 K/UL (ref 0.1–1.3)
MONOCYTES NFR BLD: 8 % (ref 4–12)
NEUTS SEG # BLD: 11.3 K/UL (ref 1.7–8.2)
NEUTS SEG NFR BLD: 80 % (ref 43–78)
NRBC # BLD: 0 K/UL (ref 0–0.2)
P-R INTERVAL, ECG05: 184 MS
PCO2 BLD: 54.1 MMHG (ref 35–45)
PH BLD: 7.36 [PH] (ref 7.35–7.45)
PLATELET # BLD AUTO: 203 K/UL (ref 150–450)
PMV BLD AUTO: 11.3 FL (ref 9.4–12.3)
PO2 BLD: 102 MMHG (ref 75–100)
POTASSIUM SERPL-SCNC: 4.4 MMOL/L (ref 3.5–5.1)
PROT SERPL-MCNC: 7.7 G/DL (ref 6.3–8.2)
Q-T INTERVAL, ECG07: 386 MS
QRS DURATION, ECG06: 90 MS
QTC CALCULATION (BEZET), ECG08: 477 MS
RBC # BLD AUTO: 4.39 M/UL (ref 4.05–5.2)
SAO2 % BLD: 97 % (ref 95–98)
SERVICE CMNT-IMP: ABNORMAL
SERVICE CMNT-IMP: ABNORMAL
SODIUM SERPL-SCNC: 137 MMOL/L (ref 136–145)
SPECIMEN TYPE: ABNORMAL
TSH SERPL DL<=0.005 MIU/L-ACNC: 1.08 UIU/ML (ref 0.36–3.74)
VENTRICULAR RATE, ECG03: 92 BPM
WBC # BLD AUTO: 14.1 K/UL (ref 4.3–11.1)

## 2019-03-21 PROCEDURE — 74011000250 HC RX REV CODE- 250: Performed by: EMERGENCY MEDICINE

## 2019-03-21 PROCEDURE — 94640 AIRWAY INHALATION TREATMENT: CPT

## 2019-03-21 PROCEDURE — 82803 BLOOD GASES ANY COMBINATION: CPT

## 2019-03-21 PROCEDURE — 74011250636 HC RX REV CODE- 250/636: Performed by: EMERGENCY MEDICINE

## 2019-03-21 PROCEDURE — 94760 N-INVAS EAR/PLS OXIMETRY 1: CPT

## 2019-03-21 PROCEDURE — 84443 ASSAY THYROID STIM HORMONE: CPT

## 2019-03-21 PROCEDURE — 83605 ASSAY OF LACTIC ACID: CPT

## 2019-03-21 PROCEDURE — 82962 GLUCOSE BLOOD TEST: CPT

## 2019-03-21 PROCEDURE — 74011636637 HC RX REV CODE- 636/637: Performed by: NURSE PRACTITIONER

## 2019-03-21 PROCEDURE — 96365 THER/PROPH/DIAG IV INF INIT: CPT | Performed by: EMERGENCY MEDICINE

## 2019-03-21 PROCEDURE — 74011636637 HC RX REV CODE- 636/637: Performed by: FAMILY MEDICINE

## 2019-03-21 PROCEDURE — 77030032490 HC SLV COMPR SCD KNE COVD -B

## 2019-03-21 PROCEDURE — 74011250637 HC RX REV CODE- 250/637: Performed by: NURSE PRACTITIONER

## 2019-03-21 PROCEDURE — 87205 SMEAR GRAM STAIN: CPT

## 2019-03-21 PROCEDURE — 80053 COMPREHEN METABOLIC PANEL: CPT

## 2019-03-21 PROCEDURE — 74011000250 HC RX REV CODE- 250: Performed by: NURSE PRACTITIONER

## 2019-03-21 PROCEDURE — 86580 TB INTRADERMAL TEST: CPT | Performed by: NURSE PRACTITIONER

## 2019-03-21 PROCEDURE — 36415 COLL VENOUS BLD VENIPUNCTURE: CPT

## 2019-03-21 PROCEDURE — 77030013140 HC MSK NEB VYRM -A

## 2019-03-21 PROCEDURE — 87040 BLOOD CULTURE FOR BACTERIA: CPT

## 2019-03-21 PROCEDURE — 96375 TX/PRO/DX INJ NEW DRUG ADDON: CPT | Performed by: EMERGENCY MEDICINE

## 2019-03-21 PROCEDURE — 36600 WITHDRAWAL OF ARTERIAL BLOOD: CPT

## 2019-03-21 PROCEDURE — 74011000302 HC RX REV CODE- 302: Performed by: NURSE PRACTITIONER

## 2019-03-21 PROCEDURE — 71045 X-RAY EXAM CHEST 1 VIEW: CPT

## 2019-03-21 PROCEDURE — 65270000029 HC RM PRIVATE

## 2019-03-21 PROCEDURE — 93005 ELECTROCARDIOGRAM TRACING: CPT | Performed by: EMERGENCY MEDICINE

## 2019-03-21 PROCEDURE — 99285 EMERGENCY DEPT VISIT HI MDM: CPT | Performed by: EMERGENCY MEDICINE

## 2019-03-21 PROCEDURE — 74011250636 HC RX REV CODE- 250/636: Performed by: NURSE PRACTITIONER

## 2019-03-21 PROCEDURE — 85025 COMPLETE CBC W/AUTO DIFF WBC: CPT

## 2019-03-21 PROCEDURE — 77030027138 HC INCENT SPIROMETER -A

## 2019-03-21 PROCEDURE — 83880 ASSAY OF NATRIURETIC PEPTIDE: CPT

## 2019-03-21 RX ORDER — ALPRAZOLAM 0.5 MG/1
1 TABLET ORAL
Status: DISCONTINUED | OUTPATIENT
Start: 2019-03-21 | End: 2019-03-23

## 2019-03-21 RX ORDER — ALBUTEROL SULFATE 0.83 MG/ML
5 SOLUTION RESPIRATORY (INHALATION)
Status: COMPLETED | OUTPATIENT
Start: 2019-03-21 | End: 2019-03-21

## 2019-03-21 RX ORDER — ESCITALOPRAM OXALATE 10 MG/1
20 TABLET ORAL DAILY
Status: DISCONTINUED | OUTPATIENT
Start: 2019-03-22 | End: 2019-03-26 | Stop reason: HOSPADM

## 2019-03-21 RX ORDER — LEVOFLOXACIN 5 MG/ML
750 INJECTION, SOLUTION INTRAVENOUS EVERY 24 HOURS
Status: DISCONTINUED | OUTPATIENT
Start: 2019-03-22 | End: 2019-03-25 | Stop reason: DRUGHIGH

## 2019-03-21 RX ORDER — CLOPIDOGREL BISULFATE 75 MG/1
75 TABLET ORAL DAILY
Status: DISCONTINUED | OUTPATIENT
Start: 2019-03-22 | End: 2019-03-26 | Stop reason: HOSPADM

## 2019-03-21 RX ORDER — PANTOPRAZOLE SODIUM 40 MG/1
40 TABLET, DELAYED RELEASE ORAL 2 TIMES DAILY
Status: DISCONTINUED | OUTPATIENT
Start: 2019-03-21 | End: 2019-03-26 | Stop reason: HOSPADM

## 2019-03-21 RX ORDER — GUAIFENESIN 100 MG/5ML
81 LIQUID (ML) ORAL DAILY
Status: DISCONTINUED | OUTPATIENT
Start: 2019-03-22 | End: 2019-03-26 | Stop reason: HOSPADM

## 2019-03-21 RX ORDER — METOPROLOL TARTRATE 25 MG/1
25 TABLET, FILM COATED ORAL 2 TIMES DAILY
Status: DISCONTINUED | OUTPATIENT
Start: 2019-03-21 | End: 2019-03-26 | Stop reason: HOSPADM

## 2019-03-21 RX ORDER — HYDROCODONE BITARTRATE AND ACETAMINOPHEN 10; 325 MG/1; MG/1
1 TABLET ORAL
COMMUNITY
End: 2020-11-19 | Stop reason: ALTCHOICE

## 2019-03-21 RX ORDER — LEVOTHYROXINE SODIUM 100 UG/1
100 TABLET ORAL
Status: DISCONTINUED | OUTPATIENT
Start: 2019-03-22 | End: 2019-03-26 | Stop reason: HOSPADM

## 2019-03-21 RX ORDER — ROSUVASTATIN CALCIUM 5 MG/1
10 TABLET, COATED ORAL
Status: DISCONTINUED | OUTPATIENT
Start: 2019-03-21 | End: 2019-03-26 | Stop reason: HOSPADM

## 2019-03-21 RX ORDER — NIACIN 500 MG/1
500 TABLET, EXTENDED RELEASE ORAL
Status: DISCONTINUED | OUTPATIENT
Start: 2019-03-21 | End: 2019-03-26 | Stop reason: HOSPADM

## 2019-03-21 RX ORDER — NYSTATIN 100000 [USP'U]/G
POWDER TOPICAL 2 TIMES DAILY
Status: DISCONTINUED | OUTPATIENT
Start: 2019-03-21 | End: 2019-03-26 | Stop reason: HOSPADM

## 2019-03-21 RX ORDER — INSULIN LISPRO 100 [IU]/ML
INJECTION, SOLUTION INTRAVENOUS; SUBCUTANEOUS
Status: DISCONTINUED | OUTPATIENT
Start: 2019-03-21 | End: 2019-03-22

## 2019-03-21 RX ORDER — LEVOFLOXACIN 5 MG/ML
750 INJECTION, SOLUTION INTRAVENOUS EVERY 24 HOURS
Status: DISCONTINUED | OUTPATIENT
Start: 2019-03-21 | End: 2019-03-21

## 2019-03-21 RX ORDER — IPRATROPIUM BROMIDE AND ALBUTEROL SULFATE 2.5; .5 MG/3ML; MG/3ML
3 SOLUTION RESPIRATORY (INHALATION)
Status: DISCONTINUED | OUTPATIENT
Start: 2019-03-21 | End: 2019-03-23

## 2019-03-21 RX ORDER — ACETAMINOPHEN 325 MG/1
650 TABLET ORAL
Status: DISCONTINUED | OUTPATIENT
Start: 2019-03-21 | End: 2019-03-26 | Stop reason: HOSPADM

## 2019-03-21 RX ORDER — LOSARTAN POTASSIUM 25 MG/1
25 TABLET ORAL DAILY
Status: DISCONTINUED | OUTPATIENT
Start: 2019-03-22 | End: 2019-03-26 | Stop reason: HOSPADM

## 2019-03-21 RX ORDER — FLUTICASONE PROPIONATE AND SALMETEROL 250; 50 UG/1; UG/1
1 POWDER RESPIRATORY (INHALATION) 2 TIMES DAILY
Status: DISCONTINUED | OUTPATIENT
Start: 2019-03-21 | End: 2019-03-26 | Stop reason: HOSPADM

## 2019-03-21 RX ORDER — IPRATROPIUM BROMIDE AND ALBUTEROL SULFATE 2.5; .5 MG/3ML; MG/3ML
3 SOLUTION RESPIRATORY (INHALATION)
Status: COMPLETED | OUTPATIENT
Start: 2019-03-21 | End: 2019-03-21

## 2019-03-21 RX ORDER — FENOFIBRATE 160 MG/1
160 TABLET ORAL DAILY
Status: DISCONTINUED | OUTPATIENT
Start: 2019-03-22 | End: 2019-03-26 | Stop reason: HOSPADM

## 2019-03-21 RX ORDER — SODIUM CHLORIDE 0.9 % (FLUSH) 0.9 %
5-40 SYRINGE (ML) INJECTION EVERY 8 HOURS
Status: DISCONTINUED | OUTPATIENT
Start: 2019-03-21 | End: 2019-03-26 | Stop reason: HOSPADM

## 2019-03-21 RX ORDER — ENOXAPARIN SODIUM 100 MG/ML
40 INJECTION SUBCUTANEOUS EVERY 24 HOURS
Status: DISCONTINUED | OUTPATIENT
Start: 2019-03-21 | End: 2019-03-26 | Stop reason: HOSPADM

## 2019-03-21 RX ORDER — LEVOFLOXACIN 5 MG/ML
750 INJECTION, SOLUTION INTRAVENOUS
Status: COMPLETED | OUTPATIENT
Start: 2019-03-21 | End: 2019-03-21

## 2019-03-21 RX ORDER — INSULIN LISPRO 100 [IU]/ML
4 INJECTION, SOLUTION INTRAVENOUS; SUBCUTANEOUS ONCE
Status: COMPLETED | OUTPATIENT
Start: 2019-03-21 | End: 2019-03-21

## 2019-03-21 RX ORDER — SODIUM CHLORIDE 0.9 % (FLUSH) 0.9 %
5-40 SYRINGE (ML) INJECTION AS NEEDED
Status: DISCONTINUED | OUTPATIENT
Start: 2019-03-21 | End: 2019-03-26 | Stop reason: HOSPADM

## 2019-03-21 RX ORDER — ONDANSETRON 2 MG/ML
4 INJECTION INTRAMUSCULAR; INTRAVENOUS
Status: DISCONTINUED | OUTPATIENT
Start: 2019-03-21 | End: 2019-03-26 | Stop reason: HOSPADM

## 2019-03-21 RX ORDER — AMOXICILLIN 250 MG
1 CAPSULE ORAL
Status: DISCONTINUED | OUTPATIENT
Start: 2019-03-21 | End: 2019-03-26 | Stop reason: HOSPADM

## 2019-03-21 RX ORDER — NICOTINE 7MG/24HR
1 PATCH, TRANSDERMAL 24 HOURS TRANSDERMAL EVERY 24 HOURS
Status: DISCONTINUED | OUTPATIENT
Start: 2019-03-21 | End: 2019-03-23

## 2019-03-21 RX ORDER — GUAIFENESIN 600 MG/1
1200 TABLET, EXTENDED RELEASE ORAL 2 TIMES DAILY
Status: DISCONTINUED | OUTPATIENT
Start: 2019-03-21 | End: 2019-03-26 | Stop reason: HOSPADM

## 2019-03-21 RX ORDER — INSULIN GLARGINE 100 [IU]/ML
80 INJECTION, SOLUTION SUBCUTANEOUS
Status: DISCONTINUED | OUTPATIENT
Start: 2019-03-21 | End: 2019-03-22

## 2019-03-21 RX ADMIN — IPRATROPIUM BROMIDE AND ALBUTEROL SULFATE 3 ML: .5; 3 SOLUTION RESPIRATORY (INHALATION) at 20:35

## 2019-03-21 RX ADMIN — ALBUTEROL SULFATE 5 MG: 2.5 SOLUTION RESPIRATORY (INHALATION) at 14:32

## 2019-03-21 RX ADMIN — Medication 10 ML: at 23:34

## 2019-03-21 RX ADMIN — ENOXAPARIN SODIUM 40 MG: 40 INJECTION SUBCUTANEOUS at 18:51

## 2019-03-21 RX ADMIN — INSULIN LISPRO 4 UNITS: 100 INJECTION, SOLUTION INTRAVENOUS; SUBCUTANEOUS at 22:44

## 2019-03-21 RX ADMIN — ALPRAZOLAM 1 MG: 0.5 TABLET ORAL at 23:51

## 2019-03-21 RX ADMIN — ROSUVASTATIN CALCIUM 10 MG: 5 TABLET, FILM COATED ORAL at 22:44

## 2019-03-21 RX ADMIN — INSULIN LISPRO 10 UNITS: 100 INJECTION, SOLUTION INTRAVENOUS; SUBCUTANEOUS at 23:30

## 2019-03-21 RX ADMIN — METHYLPREDNISOLONE SODIUM SUCCINATE 40 MG: 125 INJECTION, POWDER, FOR SOLUTION INTRAMUSCULAR; INTRAVENOUS at 18:29

## 2019-03-21 RX ADMIN — NYSTATIN: 100000 POWDER TOPICAL at 20:44

## 2019-03-21 RX ADMIN — TUBERCULIN PURIFIED PROTEIN DERIVATIVE 5 UNITS: 5 INJECTION, SOLUTION INTRADERMAL at 18:07

## 2019-03-21 RX ADMIN — INSULIN GLARGINE 80 UNITS: 100 INJECTION, SOLUTION SUBCUTANEOUS at 22:44

## 2019-03-21 RX ADMIN — IPRATROPIUM BROMIDE AND ALBUTEROL SULFATE 3 ML: .5; 3 SOLUTION RESPIRATORY (INHALATION) at 23:50

## 2019-03-21 RX ADMIN — IPRATROPIUM BROMIDE AND ALBUTEROL SULFATE 3 ML: .5; 3 SOLUTION RESPIRATORY (INHALATION) at 12:40

## 2019-03-21 RX ADMIN — LEVOFLOXACIN 750 MG: 5 INJECTION, SOLUTION INTRAVENOUS at 15:56

## 2019-03-21 RX ADMIN — Medication 10 ML: at 18:07

## 2019-03-21 RX ADMIN — GUAIFENESIN 1200 MG: 600 TABLET, EXTENDED RELEASE ORAL at 18:51

## 2019-03-21 RX ADMIN — METOPROLOL TARTRATE 25 MG: 25 TABLET ORAL at 18:30

## 2019-03-21 RX ADMIN — PANTOPRAZOLE SODIUM 40 MG: 40 TABLET, DELAYED RELEASE ORAL at 18:30

## 2019-03-21 RX ADMIN — METHYLPREDNISOLONE SODIUM SUCCINATE 125 MG: 125 INJECTION, POWDER, FOR SOLUTION INTRAMUSCULAR; INTRAVENOUS at 13:11

## 2019-03-21 NOTE — ED PROVIDER NOTES
62-year-old female with history of hypertension, COPD on 2 L home O2, CAD presents with complaint of worsening shortness of breath since she was hospitalized on February 2019. States that she has had generalized weakness and fatigue as well as productive cough with yellow sputum. Patient states that she continues to smoke half a pack of cigarettes daily. States she has been using her nebulizer twice daily without improvement. Reports left-sided chest pain with coughing. Denies radiation of pain. Patient arrives via EMS on 6 L O2 via nasal cannula. Denies chills, nausea, vomiting, diaphoresis, abdominal pain, increased leg swelling. Reports subjective fever. States she took two Tylenol around 11 am. Lungs with diffuse wheezes noted bilaterally and intermittent cough while in triage. EKG with normal sinus rhythm with HR 92. Pt with O2 sat 92% on 6L. ABG order placed. Orders placed for patient to receive DuoNeb and Solu-Medrol. Michael Rodriges MD; 3/21/2019 @12:22 PM Voice dictation software was used during the making of this note. This software is not perfect and grammatical and other typographical errors may be present. This note has not been proofread for errors. 
===================================================================  
 
 
I have taken over care from the physician in triage. I agree with their documentation. Patient still smoking with history of COPD and seems to be a COPD exacerbation. Aba Reaves MD 1:37 PM   
 
 
Shortness of Breath Associated symptoms include cough, wheezing and chest pain. Pertinent negatives include no fever, no vomiting and no abdominal pain. Past Medical History:  
Diagnosis Date  Anxiety   
 managed with medication  Arthritis  ASCAD - of the native vessel 2/27/2013  Asthma  CAD (coronary artery disease) stents x 3  
 Cancer ([facility] [address]   
 hx uterine cancer  Chronic pain d/t fibromyalgia  Claustrophobia  COPD   
 PRN inhaler; uses once every 2-3 months  Current smoker  Degenerative joint disease  Depression   
 managed with medication  Diabetes ([address] type 2; normal fasting bs (  ); Insulin dependent; checks bs 4 times per day  Diverticulosis   
 managed with diet  DM (diabetes mellitus) w/o complication 8/27/7589  Dyslipidemia  Fatty liver  Fibromyalgia  GERD (gastroesophageal reflux disease)   
 controlled w/med  H/O echocardiogram 01/20/14 EF >69.7%  
 HTN (hypertension) - controlled, benign 2/27/2013  Hyperlipemia   
 managed with medication  Hypertension   
 controlled w/med  Hypothyroidism   
 managed with medication  IBS (irritable bowel syndrome) w/diverticulitis  Influenza A (H5N1) 2/4/2019  Lipid - hyperlipidemia other unsp dyslipidemia 6/9/2016  Macular degeneration  Mass of kidney  Murmur   
 monitored by Cardiologist, Manuel Wilcox; last echo 1/20/14  Myalgia and myositis, unspecified   
 no present treatment  Obesity BMI 39.1  JEREMÍAS (obstructive sleep apnea)- intolerant of CPAP 2/4/2019  Osteoarthrosis, unspecified whether generalized or localized, unspecified site   
 no present treatment  Psychiatric disorder   
 anxiety/depression  PUD (peptic ulcer disease) 1980  S/P total knee arthroplasty 6/22/2016  Seizures ([address] 12/2011  
 s/p stopping xanax abruptly  Sleep apnea   
 noncompliant with CPAP  
 Status post total knee replacement 2/27/2013  Syncope and collapse 6/9/2016  Tobacco use disorder 6/9/2016  Urinary, incontinence, stress female w/ cystocele-rectocele repair  Vertigo   
 managed with prn medication Past Surgical History:  
Procedure Laterality Date  CARDIAC SURG PROCEDURE UNLIST    
 3 caths total; stents x3, last stent 02/2014  COLONOSCOPY    
 HX ANKLE FRACTURE TX Right   
 repair with hardware  HX APPENDECTOMY  HX BREAST LUMPECTOMY Bilateral   
 HX CARPAL TUNNEL RELEASE Right [id number]  HX KNEE ARTHROSCOPY Right  HX KNEE REPLACEMENT Left  HX LAP CHOLECYSTECTOMY  HX TUBAL LIGATION    
 HX UROLOGICAL    
 sling Family History:  
Problem Relation Age of Onset  Heart Attack Mother MI age 70  
 Diabetes Mother  Heart Disease Mother  Heart Disease Sister  Heart Failure Sister 60  
     cabg  Diabetes Sister  Heart Disease Brother  Heart Attack Brother 28  
     mi  Cancer Brother  Heart Failure Sister 61  
     cabg  Heart Disease Sister  Cancer Father  Alzheimer Father  Heart Attack Brother Social History Socioeconomic History  Marital status:  Spouse name: Not on file  Number of children: Not on file  Years of education: Not on file  Highest education level: Not on file Occupational History  Not on file Social Needs  Financial resource strain: Not on file  Food insecurity:  
  Worry: Not on file Inability: Not on file  Transportation needs:  
  Medical: Not on file Non-medical: Not on file Tobacco Use  Smoking status: Current Every Day Smoker Packs/day: 0.25 Years: 53.00 Pack years: 13.25  Smokeless tobacco: Never Used Substance and Sexual Activity  Alcohol use: No  
 Drug use: No  
 Sexual activity: Not on file Lifestyle  Physical activity:  
  Days per week: Not on file Minutes per session: Not on file  Stress: Not on file Relationships  Social connections:  
  Talks on phone: Not on file Gets together: Not on file Attends [employer] service: Not on file Active member of club or organization: Not on file Attends meetings of clubs or organizations: Not on file Relationship status: Not on file  Intimate partner violence:  
  Fear of current or ex partner: Not on file Emotionally abused: Not on file Physically abused: Not on file Forced sexual activity: Not on file Other Topics Concern  Not on file Social History Narrative Pt lives with son, DIL, and 3 grandchildren. She has one dog. She is retired. ALLERGIES: Lisinopril and Oxycodone Review of Systems Constitutional: Negative for chills, fatigue and fever. Respiratory: Positive for cough, shortness of breath and wheezing. Cardiovascular: Positive for chest pain. Gastrointestinal: Negative for abdominal pain, nausea and vomiting. Vitals:  
 03/21/19 1219 BP: 114/69 Pulse: 93 Resp: 22 Temp: 99.3 °F (37.4 °C) SpO2: 92% Weight: 105.2 kg (232 lb) Height: 5' 3.5\" (1.613 m) Physical Exam  
Constitutional: She is oriented to person, place, and time. She appears well-developed and well-nourished. Non-toxic appearance. She does not appear ill. No distress. HENT:  
Head: Normocephalic and atraumatic. Mouth/Throat: Oropharynx is clear and moist. No oropharyngeal exudate or posterior oropharyngeal edema. Eyes: Pupils are equal, round, and reactive to light. Conjunctivae and EOM are normal. Right eye exhibits no discharge. Left eye exhibits no discharge. Neck: Normal range of motion. Neck supple. No tracheal deviation present. No thyromegaly present. Cardiovascular: Normal rate, regular rhythm and normal heart sounds. Exam reveals no gallop and no friction rub. No murmur heard. Pulmonary/Chest: No stridor. She has wheezes. She has no rales. Slight increased respiratory rate. Patient currently on 5 L of oxygen. Abdominal: Soft. She exhibits no mass. There is no tenderness. There is no guarding. Neurological: She is alert and oriented to person, place, and time. No focal weakness speech normal  
Skin: Skin is warm and dry. Capillary refill takes less than 2 seconds. No rash noted. She is not diaphoretic. No erythema. Psychiatric: She has a normal mood and affect. Her behavior is normal.  
Nursing note and vitals reviewed. MDM Number of Diagnoses or Management Options Diagnosis management comments: Patient has fairly severe COPD exacerbation I am giving patient albuterol she still is requiring about 6 L of oxygen and has continued wheezing. I have paged hospitalist for admission. Questionable interstitial pattern seen on chest x-ray I did Levaquin. Amount and/or Complexity of Data Reviewed Clinical lab tests: ordered and reviewed (Results for orders placed or performed during the hospital encounter of 03/21/19 
-CBC WITH AUTOMATED DIFF Result                      Value             Ref Range WBC                         14.1 (H)          4.3 - 11.1 K* 
     RBC                         4.39              4.05 - 5.2 M* HGB                         12.7              11.7 - 15.4 * HCT                         39.5              35.8 - 46.3 % MCV                         90.0              79.6 - 97.8 * MCH                         28.9              26.1 - 32.9 * MCHC                        32.2              31.4 - 35.0 * RDW                         13.2              11.9 - 14.6 % PLATELET                    203               150 - 450 K/* MPV                         11.3              9.4 - 12.3 FL ABSOLUTE NRBC               0.00              0.0 - 0.2 K/* DF                          AUTOMATED NEUTROPHILS                 80 (H)            43 - 78 % LYMPHOCYTES                 12 (L)            13 - 44 % MONOCYTES                   8                 4.0 - 12.0 % EOSINOPHILS                 0 (L)             0.5 - 7.8 % BASOPHILS                   0                 0.0 - 2.0 % IMMATURE GRANULOCYTES       1                 0.0 - 5.0 % ABS. NEUTROPHILS            11.3 (H)          1.7 - 8.2 K/* ABS. LYMPHOCYTES            1.7               0.5 - 4.6 K/* ABS. MONOCYTES              1.1               0.1 - 1.3 K/* ABS. EOSINOPHILS            0.0               0.0 - 0.8 K/* ABS. BASOPHILS              0.0               0.0 - 0.2 K/* ABS. IMM. GRANS.            0.1               0.0 - 0.5 K/* 
-BNP Result                      Value             Ref Range BNP                         28 (H)            0 pg/mL       
-METABOLIC PANEL, COMPREHENSIVE Result                      Value             Ref Range Sodium                      137               136 - 145 mm* Potassium                   4.4               3.5 - 5.1 mm* Chloride                    100               98 - 107 mmo* CO2                         30                21 - 32 mmol* Anion gap                   7                 7 - 16 mmol/L Glucose                     287 (H)           65 - 100 mg/* BUN                         12                8 - 23 MG/DL Creatinine                  0.98              0.6 - 1.0 MG* 
     GFR est AA                  >60               >60 ml/min/1* GFR est non-AA              60 (L)            >60 ml/min/1* Calcium                     9.0               8.3 - 10.4 M* Bilirubin, total            0.7               0.2 - 1.1 MG* ALT (SGPT)                  10 (L)            12 - 65 U/L   
     AST (SGOT)                  24                15 - 37 U/L Alk. phosphatase            90                50 - 136 U/L Protein, total              7.7               6.3 - 8.2 g/* Albumin                     3.1 (L)           3.2 - 4.6 g/* Globulin                    4.6 (H)           2.3 - 3.5 g/*      A-G Ratio                   0.7 (L)           1.2 - 3.5     
-POC G3 
 Result                      Value             Ref Range Device:                     NASAL CANNULA pH (POC)                    7.360             7.35 - 7.45   
     pCO2 (POC)                  54.1 (H)          35 - 45 MMHG  
     pO2 (POC)                   102 (H)           75 - 100 MMHG 
     HCO3 (POC)                  30.6 (H)          22 - 26 MMOL* 
     sO2 (POC)                   97                95 - 98 % Base excess (POC)           4                 mmol/L Allens test (POC)           YES Site RIGHT BRACHIAL Patient temp. 98.6 Specimen type (POC)         ARTERIAL Performed by                                                
 Link 
     CO2, POC                    32                MMOL/L Flow rate (POC)             6.000             L/min Respiratory comment:        NurseNotified COLLECT TIME                1,247                           
-EKG, 12 LEAD, INITIAL Result                      Value             Ref Range Ventricular Rate            92                BPM           
     Atrial Rate                 92                BPM           
     P-R Interval                184               ms            
     QRS Duration                90                ms Q-T Interval                386               ms            
     QTC Calculation (Bezet)     477               ms            
     Calculated P Axis           43                degrees Calculated R Axis           78                degrees Calculated T Axis           86                degrees Diagnosis Normal sinus rhythm RSR' or QR pattern in V1 suggests right ventricular conduction delay Borderline ECG When compared with ECG of 22-JUN-2016 17:02, 
 AL interval has decreased Vent. rate has increased BY  35 BPM 
 RSR' pattern in V1 is now Present Confirmed by BETH EVANS (), Cele Chacon ([id number]) on 3/21/2019 3:19:44 PM 
  ) Tests in the radiology section of CPT®: ordered and reviewed (Xr Chest [facility] Result Date: 3/21/2019 EXAM: XR CHEST PORT INDICATION: SOB COMPARISON: 8/15/2018 FINDINGS: A portable AP radiograph of the chest was obtained at 1305 hours. The patient is on a cardiac monitor. Increase in interstitial prominence. The cardiac and mediastinal contours and pulmonary vascularity are normal.  The bones and soft tissues are grossly within normal limits. IMPRESSION: Mild interstitial pulmonary edema. ) Discuss the patient with other providers: yes Independent visualization of images, tracings, or specimens: yes Procedures

## 2019-03-21 NOTE — PROGRESS NOTES
03/21/19 1742 Dual Skin Pressure Injury Assessment Dual Skin Pressure Injury Assessment WDL (Bottom was red, but blanchable) Second Care Provider (Based on [address] [place] [address]) August Aguilera RN Skin Integumentary Skin Integumentary (WDL) X Skin Color Appropriate for ethnicity; Ecchymosis (comment) (bruises on abd) Skin Condition/Temp Dry;Fragile; Warm;Flaky Skin Integrity Other (comment) (scabs on shoulders, small scab on back of head) Turgor Non-tenting Hair Growth Present Varicosities Absent Wound Prevention and Protection Methods Orientation of Wound Prevention Posterior Location of Wound Prevention Sacrum/Coccyx Dressing Present  No  
Wound Offloading (Prevention Methods) Bed, pressure reduction mattress;Pillows;Repositioning;Turning Patient states \" I am feeling better\" after pain medication

## 2019-03-21 NOTE — ED NOTES
TRANSFER - OUT REPORT: 
 
Verbal report given to Pankaj Dejesus on Reinaldo Pipe  being transferred to 7th floor for routine progression of care Report consisted of patients Situation, Background, Assessment and  
Recommendations(SBAR). Information from the following report(s) ED Summary was reviewed with the receiving nurse. Lines:  
Peripheral IV 03/21/19 Left Wrist (Active) Site Assessment Clean, dry, & intact 3/21/2019  1:08 PM  
Phlebitis Assessment 0 3/21/2019  1:08 PM  
Infiltration Assessment 0 3/21/2019  1:08 PM  
Dressing Status Clean, dry, & intact 3/21/2019  1:08 PM  
   
Peripheral IV 03/21/19 Right Antecubital (Active) Site Assessment Clean, dry, & intact 3/21/2019  5:03 PM  
Phlebitis Assessment 0 3/21/2019  5:03 PM  
Infiltration Assessment 0 3/21/2019  5:03 PM  
Dressing Status Clean, dry, & intact 3/21/2019  5:03 PM  
  
 
Opportunity for questions and clarification was provided. Patient transported with: 
 O2 @ 9 hi flow liters

## 2019-03-21 NOTE — ED TRIAGE NOTES
Pt arrives from home via [facility] with c/o shortness of breath, weakness and productive cough with yellow sputum. Onset of symptoms approx 1 month ago. Admitted here 2/4 for copd exac, flu. Pt on home 02 at 2liters at night, +smoker. Arrives on 6l 02 via nc, sats 92% on arrival to triage

## 2019-03-21 NOTE — PROGRESS NOTES
TRANSFER - IN REPORT: 
 
Verbal report received from April, RN (name) on Fran Fortune  being received from ED (unit) for routine progression of care Report consisted of patients Situation, Background, Assessment and  
Recommendations(SBAR). Information from the following report(s) SBAR, Kardex, ED Summary, Intake/Output and MAR was reviewed with the receiving nurse. Opportunity for questions and clarification was provided. Assessment completed upon patients arrival to unit and care assumed.

## 2019-03-21 NOTE — H&P
History & Physcial  
NAME:  Sara Maldonado Age:  70 y.o. 
:   1950 MRN:   [id number] PCP: Bill Rondon MD 
Treatment Team: Attending Provider: Patrick Cochran MD; Primary Nurse: Polina Humphries RN 
HPI:  
Ms. Matias Monterroso is a 71 yo female with PMH of COPD on 2 L nocturnal O2, CAD, uterine cancer, DM II, HTN, JEREMÍAS does not use CPAP, smoker who presents with c/o generalized weakness and SOB worse than baseline X2 days. CXR showed mild pulmonary edema, BNP 28.  Pt recently DC in Feb for same. Intubated for COPD exacerbation in 2018 per pt. Is followed by Pulmonary in [address]. Was started on levaquin in ED. Pt denies CP, n/v/d, abd pain. Results summary of Diagnostic Studies/Procedures copied from within [facility] EMR: 
  
CXR Results  (Last 48 hours) 19 1259  XR CHEST PORT Final result Impression:  IMPRESSION: Mild interstitial pulmonary edema. Narrative:  EXAM: XR CHEST PORT INDICATION: SOB  
   
COMPARISON: 8/15/2018 FINDINGS: A portable AP radiograph of the chest was obtained at 1305 hours. The  
patient is on a cardiac monitor. Increase in interstitial prominence. The  
cardiac and mediastinal contours and pulmonary vascularity are normal.  The  
bones and soft tissues are grossly within normal limits. Complete ROS done and is as stated in HPI or otherwise negative Past Medical History:  
Diagnosis Date  Anxiety   
 managed with medication  Arthritis  ASCAD - of the native vessel 2013  Asthma  CAD (coronary artery disease) stents x 3  
 Cancer ([address]   
 hx uterine cancer  Chronic pain d/t fibromyalgia  Claustrophobia  COPD   
 PRN inhaler; uses once every 2-3 months  Current smoker  Degenerative joint disease  Depression   
 managed with medication  Diabetes ([address] type 2; normal fasting bs (  ); Insulin dependent; checks bs 4 times per day  Diverticulosis   
 managed with diet  DM (diabetes mellitus) w/o complication 9/47/9168  Dyslipidemia  Fatty liver  Fibromyalgia  GERD (gastroesophageal reflux disease)   
 controlled w/med  H/O echocardiogram 01/20/14 EF >69.7%  
 HTN (hypertension) - controlled, benign 2/27/2013  Hyperlipemia   
 managed with medication  Hypertension   
 controlled w/med  Hypothyroidism   
 managed with medication  IBS (irritable bowel syndrome) w/diverticulitis  Influenza A (H5N1) 2/4/2019  Lipid - hyperlipidemia other unsp dyslipidemia 6/9/2016  Macular degeneration  Mass of kidney  Murmur   
 monitored by Cardiologist, John Braga; last echo 1/20/14  Myalgia and myositis, unspecified   
 no present treatment  Obesity BMI 39.1  JEREMÍAS (obstructive sleep apnea)- intolerant of CPAP 2/4/2019  Osteoarthrosis, unspecified whether generalized or localized, unspecified site   
 no present treatment  Psychiatric disorder   
 anxiety/depression  PUD (peptic ulcer disease) 1980  S/P total knee arthroplasty 6/22/2016  Seizures ([address] 12/2011  
 s/p stopping xanax abruptly  Sleep apnea   
 noncompliant with CPAP  
 Status post total knee replacement 2/27/2013  Syncope and collapse 6/9/2016  Tobacco use disorder 6/9/2016  Urinary, incontinence, stress female w/ cystocele-rectocele repair  Vertigo   
 managed with prn medication Past Surgical History:  
Procedure Laterality Date  CARDIAC SURG PROCEDURE UNLIST    
 3 caths total; stents x3, last stent 02/2014  COLONOSCOPY    
 HX ANKLE FRACTURE TX Right   
 repair with hardware  HX APPENDECTOMY  HX BREAST LUMPECTOMY Bilateral   
 HX CARPAL TUNNEL RELEASE Right [address]  HX KNEE ARTHROSCOPY Right  HX KNEE REPLACEMENT Left  HX LAP CHOLECYSTECTOMY  HX TUBAL LIGATION    
 HX UROLOGICAL    
 sling Allergies Allergen Reactions  Lisinopril Swelling Throat swelling  Oxycodone Rash Social History Tobacco Use  Smoking status: Current Every Day Smoker Packs/day: 0.25 Years: 53.00 Pack years: 13.25  Smokeless tobacco: Never Used Substance Use Topics  Alcohol use: No  
  
Family History Problem Relation Age of Onset  Heart Attack Mother MI age 70  
 Diabetes Mother  Heart Disease Mother  Heart Disease Sister  Heart Failure Sister 60  
     cabg  Diabetes Sister  Heart Disease Brother  Heart Attack Brother 28  
     mi  Cancer Brother  Heart Failure Sister 61  
     cabg  Heart Disease Sister  Cancer Father  Alzheimer Father  Heart Attack Brother Objective:  
 
Visit Vitals /69 (BP 1 Location: Right arm, BP Patient Position: At rest) Pulse 93 Temp 99.3 °F (37.4 °C) Resp 22 Ht 5' 3.5\" (1.613 m) Wt 105.2 kg (232 lb) SpO2 93% BMI 40.45 kg/m² Temp (24hrs), Av.3 °F (37.4 °C), Min:99.3 °F (37.4 °C), Max:99.3 °F (37.4 °C) Oxygen Therapy O2 Sat (%): 93 % (19 1534) Pulse via Oximetry: 93 beats per minute (19 1437) O2 Device: Hi flow nasal cannula (19 1534) O2 Flow Rate (L/min): 9 l/min (19 1534) Physical Exam: 
General:    Alert, cooperative, no distress, appears stated age. Head:   Normocephalic, without obvious abnormality, atraumatic. Nose:  Nares normal. No drainage or sinus tenderness. Lungs:   Wheezing throughout. resp even and nonlabored Heart:  S1S2 present without murmurs rubs gallops. RRR. 1+ LE edema. Abdomen:   Soft, non-tender. Not distended. Bowel sounds normal. No masses. obese Extremities: No cyanosis. No clubbing. Moves ext spontaneously. Skin:     Texture, turgor normal. No rashes or lesions. Not Jaundiced Neurologic: Alert and oriented X4. No focal deficits. Data Review:  
Recent Results (from the past 24 hour(s)) EKG, 12 LEAD, INITIAL Collection Time: 03/21/19 12:20 PM  
Result Value Ref Range Ventricular Rate 92 BPM  
 Atrial Rate 92 BPM  
 P-R Interval 184 ms QRS Duration 90 ms Q-T Interval 386 ms QTC Calculation (Bezet) 477 ms Calculated P Axis 43 degrees Calculated R Axis 78 degrees Calculated T Axis 86 degrees Diagnosis Normal sinus rhythm RSR' or QR pattern in V1 suggests right ventricular conduction delay Borderline ECG When compared with ECG of 22-JUN-2016 17:02, 
OR interval has decreased Vent. rate has increased BY  35 BPM 
RSR' pattern in V1 is now Present Confirmed by BETH EVANS (), [facility] ([id number]) on 3/21/2019 3:19:44 PM 
  
CBC WITH AUTOMATED DIFF Collection Time: 03/21/19 12:42 PM  
Result Value Ref Range WBC 14.1 (H) 4.3 - 11.1 K/uL  
 RBC 4.39 4.05 - 5.2 M/uL  
 HGB 12.7 11.7 - 15.4 g/dL HCT 39.5 35.8 - 46.3 % MCV 90.0 79.6 - 97.8 FL  
 MCH 28.9 26.1 - 32.9 PG  
 MCHC 32.2 31.4 - 35.0 g/dL  
 RDW 13.2 11.9 - 14.6 % PLATELET 785 342 - 707 K/uL MPV 11.3 9.4 - 12.3 FL ABSOLUTE NRBC 0.00 0.0 - 0.2 K/uL  
 DF AUTOMATED NEUTROPHILS 80 (H) 43 - 78 % LYMPHOCYTES 12 (L) 13 - 44 % MONOCYTES 8 4.0 - 12.0 % EOSINOPHILS 0 (L) 0.5 - 7.8 % BASOPHILS 0 0.0 - 2.0 % IMMATURE GRANULOCYTES 1 0.0 - 5.0 %  
 ABS. NEUTROPHILS 11.3 (H) 1.7 - 8.2 K/UL  
 ABS. LYMPHOCYTES 1.7 0.5 - 4.6 K/UL  
 ABS. MONOCYTES 1.1 0.1 - 1.3 K/UL  
 ABS. EOSINOPHILS 0.0 0.0 - 0.8 K/UL  
 ABS. BASOPHILS 0.0 0.0 - 0.2 K/UL  
 ABS. IMM. GRANS. 0.1 0.0 - 0.5 K/UL BNP Collection Time: 03/21/19 12:47 PM  
Result Value Ref Range BNP 28 (H) 0 pg/mL METABOLIC PANEL, COMPREHENSIVE Collection Time: 03/21/19  1:07 PM  
Result Value Ref Range Sodium 137 136 - 145 mmol/L Potassium 4.4 3.5 - 5.1 mmol/L Chloride 100 98 - 107 mmol/L  
 CO2 30 21 - 32 mmol/L Anion gap 7 7 - 16 mmol/L Glucose 287 (H) 65 - 100 mg/dL  BUN 12 8 - 23 MG/DL  
 Creatinine 0.98 0.6 - 1.0 MG/DL  
 GFR est AA >60 >60 ml/min/1.73m2 GFR est non-AA 60 (L) >60 ml/min/1.73m2 Calcium 9.0 8.3 - 10.4 MG/DL Bilirubin, total 0.7 0.2 - 1.1 MG/DL  
 ALT (SGPT) 10 (L) 12 - 65 U/L  
 AST (SGOT) 24 15 - 37 U/L Alk. phosphatase 90 50 - 136 U/L Protein, total 7.7 6.3 - 8.2 g/dL Albumin 3.1 (L) 3.2 - 4.6 g/dL Globulin 4.6 (H) 2.3 - 3.5 g/dL A-G Ratio 0.7 (L) 1.2 - 3.5 POC G3 Collection Time: 03/21/19  1:07 PM  
Result Value Ref Range Device: NASAL CANNULA pH (POC) 7.360 7.35 - 7.45    
 pCO2 (POC) 54.1 (H) 35 - 45 MMHG  
 pO2 (POC) 102 (H) 75 - 100 MMHG  
 HCO3 (POC) 30.6 (H) 22 - 26 MMOL/L  
 sO2 (POC) 97 95 - 98 % Base excess (POC) 4 mmol/L Allens test (POC) YES Site RIGHT BRACHIAL Patient temp. 98.6 Specimen type (POC) ARTERIAL Performed by Link   
 CO2, POC 32 MMOL/L Flow rate (POC) 6.000 L/min Respiratory comment: NurseNotified COLLECT TIME 1,247 POC LACTIC ACID Collection Time: 03/21/19  3:38 PM  
Result Value Ref Range Lactic Acid (POC) 1.46 0.5 - 1.9 mmol/L Assessment and Plan: Active Hospital Problems Diagnosis Date Noted  COPD exacerbation ([facility] 02/06/2019 Admit to medical bed COPD exacerbation Start IV solu medrol Orlene Constant Continue Levaquin Wean O2 as tolerated to keep sats >93% Smoking cessation discussed DM II 
A1C 10.5 in Feb 2019 Restart home lantus SSI Hypothyroid TSH 0.739 April 2018 Check TSH Continue synthroid JEREMÍAS Non compliant wit CPAP Continue nocturnal O2 
 
CAD No CP Continue ASA, plavix On BB Notes, labs, VS, diagnostic testing reviewed Time spent with pt 30 min Case discussed with pt, care team, Dr. Park Moran Full Surrogate decision maker:  Rj Justin, sister [phone] Estimated length of stay:  >2MN 
DVT prophylaxis:  lovenox Jake Sun, NP

## 2019-03-22 LAB
ALBUMIN SERPL-MCNC: 2.9 G/DL (ref 3.2–4.6)
ALBUMIN/GLOB SERPL: 0.6 {RATIO} (ref 1.2–3.5)
ALP SERPL-CCNC: 86 U/L (ref 50–136)
ALT SERPL-CCNC: 15 U/L (ref 12–65)
ANION GAP SERPL CALC-SCNC: 7 MMOL/L (ref 7–16)
AST SERPL-CCNC: 13 U/L (ref 15–37)
BASOPHILS # BLD: 0 K/UL (ref 0–0.2)
BASOPHILS NFR BLD: 0 % (ref 0–2)
BILIRUB SERPL-MCNC: 0.4 MG/DL (ref 0.2–1.1)
BUN SERPL-MCNC: 20 MG/DL (ref 8–23)
CALCIUM SERPL-MCNC: 8.8 MG/DL (ref 8.3–10.4)
CHLORIDE SERPL-SCNC: 102 MMOL/L (ref 98–107)
CO2 SERPL-SCNC: 29 MMOL/L (ref 21–32)
CREAT SERPL-MCNC: 0.88 MG/DL (ref 0.6–1)
DIFFERENTIAL METHOD BLD: ABNORMAL
EOSINOPHIL # BLD: 0 K/UL (ref 0–0.8)
EOSINOPHIL NFR BLD: 0 % (ref 0.5–7.8)
ERYTHROCYTE [DISTWIDTH] IN BLOOD BY AUTOMATED COUNT: 12.9 % (ref 11.9–14.6)
GLOBULIN SER CALC-MCNC: 4.6 G/DL (ref 2.3–3.5)
GLUCOSE BLD STRIP.AUTO-MCNC: 234 MG/DL (ref 65–100)
GLUCOSE BLD STRIP.AUTO-MCNC: 304 MG/DL (ref 65–100)
GLUCOSE BLD STRIP.AUTO-MCNC: 369 MG/DL (ref 65–100)
GLUCOSE BLD STRIP.AUTO-MCNC: 387 MG/DL (ref 65–100)
GLUCOSE SERPL-MCNC: 338 MG/DL (ref 65–100)
HCT VFR BLD AUTO: 41.1 % (ref 35.8–46.3)
HGB BLD-MCNC: 12.6 G/DL (ref 11.7–15.4)
IMM GRANULOCYTES # BLD AUTO: 0.1 K/UL (ref 0–0.5)
IMM GRANULOCYTES NFR BLD AUTO: 1 % (ref 0–5)
LYMPHOCYTES # BLD: 1 K/UL (ref 0.5–4.6)
LYMPHOCYTES NFR BLD: 7 % (ref 13–44)
MCH RBC QN AUTO: 28.6 PG (ref 26.1–32.9)
MCHC RBC AUTO-ENTMCNC: 30.7 G/DL (ref 31.4–35)
MCV RBC AUTO: 93.4 FL (ref 79.6–97.8)
MM INDURATION POC: 0 MM (ref 0–5)
MONOCYTES # BLD: 0.4 K/UL (ref 0.1–1.3)
MONOCYTES NFR BLD: 3 % (ref 4–12)
NEUTS SEG # BLD: 12 K/UL (ref 1.7–8.2)
NEUTS SEG NFR BLD: 88 % (ref 43–78)
NRBC # BLD: 0 K/UL (ref 0–0.2)
PLATELET # BLD AUTO: 200 K/UL (ref 150–450)
PMV BLD AUTO: 11.1 FL (ref 9.4–12.3)
POTASSIUM SERPL-SCNC: 4.1 MMOL/L (ref 3.5–5.1)
PPD POC: NEGATIVE NEGATIVE
PROT SERPL-MCNC: 7.5 G/DL (ref 6.3–8.2)
RBC # BLD AUTO: 4.4 M/UL (ref 4.05–5.2)
SODIUM SERPL-SCNC: 138 MMOL/L (ref 136–145)
WBC # BLD AUTO: 13.6 K/UL (ref 4.3–11.1)

## 2019-03-22 PROCEDURE — 74011000250 HC RX REV CODE- 250: Performed by: NURSE PRACTITIONER

## 2019-03-22 PROCEDURE — 74011250636 HC RX REV CODE- 250/636: Performed by: INTERNAL MEDICINE

## 2019-03-22 PROCEDURE — 74011250636 HC RX REV CODE- 250/636: Performed by: NURSE PRACTITIONER

## 2019-03-22 PROCEDURE — 97530 THERAPEUTIC ACTIVITIES: CPT

## 2019-03-22 PROCEDURE — 85025 COMPLETE CBC W/AUTO DIFF WBC: CPT

## 2019-03-22 PROCEDURE — 94640 AIRWAY INHALATION TREATMENT: CPT

## 2019-03-22 PROCEDURE — 74011636637 HC RX REV CODE- 636/637: Performed by: INTERNAL MEDICINE

## 2019-03-22 PROCEDURE — 97165 OT EVAL LOW COMPLEX 30 MIN: CPT

## 2019-03-22 PROCEDURE — 74011250637 HC RX REV CODE- 250/637: Performed by: INTERNAL MEDICINE

## 2019-03-22 PROCEDURE — 74011250636 HC RX REV CODE- 250/636: Performed by: FAMILY MEDICINE

## 2019-03-22 PROCEDURE — 77030020120 HC VLV RESP PEP HI -B

## 2019-03-22 PROCEDURE — 74011250637 HC RX REV CODE- 250/637: Performed by: NURSE PRACTITIONER

## 2019-03-22 PROCEDURE — 80053 COMPREHEN METABOLIC PANEL: CPT

## 2019-03-22 PROCEDURE — 82962 GLUCOSE BLOOD TEST: CPT

## 2019-03-22 PROCEDURE — 77030018846 HC SOL IRR STRL H20 ICUM -A

## 2019-03-22 PROCEDURE — 65270000029 HC RM PRIVATE

## 2019-03-22 PROCEDURE — 74011636637 HC RX REV CODE- 636/637: Performed by: NURSE PRACTITIONER

## 2019-03-22 PROCEDURE — 97161 PT EVAL LOW COMPLEX 20 MIN: CPT

## 2019-03-22 PROCEDURE — 77010033678 HC OXYGEN DAILY

## 2019-03-22 PROCEDURE — 94760 N-INVAS EAR/PLS OXIMETRY 1: CPT

## 2019-03-22 PROCEDURE — 36415 COLL VENOUS BLD VENIPUNCTURE: CPT

## 2019-03-22 RX ORDER — INSULIN GLARGINE 100 [IU]/ML
90 INJECTION, SOLUTION SUBCUTANEOUS
Status: DISCONTINUED | OUTPATIENT
Start: 2019-03-22 | End: 2019-03-23

## 2019-03-22 RX ORDER — INSULIN LISPRO 100 [IU]/ML
INJECTION, SOLUTION INTRAVENOUS; SUBCUTANEOUS
Status: DISCONTINUED | OUTPATIENT
Start: 2019-03-22 | End: 2019-03-23 | Stop reason: SDUPTHER

## 2019-03-22 RX ORDER — INSULIN LISPRO 100 [IU]/ML
18 INJECTION, SOLUTION INTRAVENOUS; SUBCUTANEOUS ONCE
Status: COMPLETED | OUTPATIENT
Start: 2019-03-22 | End: 2019-03-22

## 2019-03-22 RX ORDER — HYDROCODONE BITARTRATE AND ACETAMINOPHEN 10; 325 MG/1; MG/1
1 TABLET ORAL
Status: DISCONTINUED | OUTPATIENT
Start: 2019-03-22 | End: 2019-03-26 | Stop reason: HOSPADM

## 2019-03-22 RX ADMIN — IPRATROPIUM BROMIDE AND ALBUTEROL SULFATE 3 ML: .5; 3 SOLUTION RESPIRATORY (INHALATION) at 14:51

## 2019-03-22 RX ADMIN — IPRATROPIUM BROMIDE AND ALBUTEROL SULFATE 3 ML: .5; 3 SOLUTION RESPIRATORY (INHALATION) at 11:30

## 2019-03-22 RX ADMIN — NYSTATIN: 100000 POWDER TOPICAL at 17:18

## 2019-03-22 RX ADMIN — METHYLPREDNISOLONE SODIUM SUCCINATE 40 MG: 125 INJECTION, POWDER, FOR SOLUTION INTRAMUSCULAR; INTRAVENOUS at 08:10

## 2019-03-22 RX ADMIN — IPRATROPIUM BROMIDE AND ALBUTEROL SULFATE 3 ML: .5; 3 SOLUTION RESPIRATORY (INHALATION) at 19:50

## 2019-03-22 RX ADMIN — INSULIN GLARGINE 90 UNITS: 100 INJECTION, SOLUTION SUBCUTANEOUS at 23:14

## 2019-03-22 RX ADMIN — INSULIN LISPRO 8 UNITS: 100 INJECTION, SOLUTION INTRAVENOUS; SUBCUTANEOUS at 08:09

## 2019-03-22 RX ADMIN — ROSUVASTATIN CALCIUM 10 MG: 5 TABLET, FILM COATED ORAL at 23:01

## 2019-03-22 RX ADMIN — ALPRAZOLAM 1 MG: 0.5 TABLET ORAL at 16:03

## 2019-03-22 RX ADMIN — PANTOPRAZOLE SODIUM 40 MG: 40 TABLET, DELAYED RELEASE ORAL at 08:10

## 2019-03-22 RX ADMIN — Medication 10 ML: at 06:03

## 2019-03-22 RX ADMIN — GUAIFENESIN 1200 MG: 600 TABLET, EXTENDED RELEASE ORAL at 17:18

## 2019-03-22 RX ADMIN — TIOTROPIUM BROMIDE 18 MCG: 18 CAPSULE ORAL; RESPIRATORY (INHALATION) at 08:00

## 2019-03-22 RX ADMIN — ASPIRIN 81 MG 81 MG: 81 TABLET ORAL at 08:11

## 2019-03-22 RX ADMIN — FENOFIBRATE 160 MG: 160 TABLET ORAL at 08:11

## 2019-03-22 RX ADMIN — INSULIN LISPRO 10 UNITS: 100 INJECTION, SOLUTION INTRAVENOUS; SUBCUTANEOUS at 16:03

## 2019-03-22 RX ADMIN — LEVOFLOXACIN 750 MG: 5 INJECTION, SOLUTION INTRAVENOUS at 16:06

## 2019-03-22 RX ADMIN — ENOXAPARIN SODIUM 40 MG: 40 INJECTION SUBCUTANEOUS at 17:18

## 2019-03-22 RX ADMIN — INSULIN LISPRO 6 UNITS: 100 INJECTION, SOLUTION INTRAVENOUS; SUBCUTANEOUS at 23:14

## 2019-03-22 RX ADMIN — IPRATROPIUM BROMIDE AND ALBUTEROL SULFATE 3 ML: .5; 3 SOLUTION RESPIRATORY (INHALATION) at 07:57

## 2019-03-22 RX ADMIN — ACETAMINOPHEN 650 MG: 325 TABLET, FILM COATED ORAL at 11:32

## 2019-03-22 RX ADMIN — GUAIFENESIN 1200 MG: 600 TABLET, EXTENDED RELEASE ORAL at 08:10

## 2019-03-22 RX ADMIN — HYDROCODONE BITARTRATE AND ACETAMINOPHEN 1 TABLET: 10; 325 TABLET ORAL at 14:05

## 2019-03-22 RX ADMIN — METHYLPREDNISOLONE SODIUM SUCCINATE 40 MG: 125 INJECTION, POWDER, FOR SOLUTION INTRAMUSCULAR; INTRAVENOUS at 23:01

## 2019-03-22 RX ADMIN — LEVOTHYROXINE SODIUM 100 MCG: 100 TABLET ORAL at 05:56

## 2019-03-22 RX ADMIN — NYSTATIN: 100000 POWDER TOPICAL at 08:11

## 2019-03-22 RX ADMIN — Medication 5 ML: at 23:01

## 2019-03-22 RX ADMIN — ESCITALOPRAM OXALATE 20 MG: 10 TABLET ORAL at 08:11

## 2019-03-22 RX ADMIN — PANTOPRAZOLE SODIUM 40 MG: 40 TABLET, DELAYED RELEASE ORAL at 17:19

## 2019-03-22 RX ADMIN — ALPRAZOLAM 1 MG: 0.5 TABLET ORAL at 23:00

## 2019-03-22 RX ADMIN — INSULIN LISPRO 10 UNITS: 100 INJECTION, SOLUTION INTRAVENOUS; SUBCUTANEOUS at 11:30

## 2019-03-22 RX ADMIN — LOSARTAN POTASSIUM 25 MG: 25 TABLET ORAL at 08:10

## 2019-03-22 RX ADMIN — METOPROLOL TARTRATE 25 MG: 25 TABLET ORAL at 08:10

## 2019-03-22 RX ADMIN — CLOPIDOGREL BISULFATE 75 MG: 75 TABLET, FILM COATED ORAL at 08:11

## 2019-03-22 RX ADMIN — INSULIN LISPRO 18 UNITS: 100 INJECTION, SOLUTION INTRAVENOUS; SUBCUTANEOUS at 17:18

## 2019-03-22 RX ADMIN — IPRATROPIUM BROMIDE AND ALBUTEROL SULFATE 3 ML: .5; 3 SOLUTION RESPIRATORY (INHALATION) at 23:33

## 2019-03-22 RX ADMIN — METOPROLOL TARTRATE 25 MG: 25 TABLET ORAL at 17:18

## 2019-03-22 RX ADMIN — Medication 5 ML: at 14:05

## 2019-03-22 NOTE — PROGRESS NOTES
Problem: Self Care Deficits Care Plan (Adult) Goal: *Acute Goals and Plan of Care (Insert Text) Description NA. Darya Spencer is at/near baseline for ADLs. Outcome: Resolved/Met OCCUPATIONAL THERAPY: Initial Assessment, Discharge and PM 3/22/2019 INPATIENT:   
Payor: Ebony Jordan / Plan: [address] / Product Type: Managed Care Medicare /  
  
NAME/AGE/GENDER: Darya Spencer is a 70 y.o. female PRIMARY DIAGNOSIS:  COPD exacerbation ([facility] [J44.1] <principal problem not specified> 
 <principal problem not specified> 
 
  
  
ICD-10: Treatment Diagnosis:  
 · Generalized Muscle Weakness (M62.81) Precautions/Allergies: 
  Fall precautions Lisinopril and Oxycodone ASSESSMENT:  
 
Ms. Shira Marks is a 70 y.o. female admitted with the above. At baseline, pt lives with family members and is independent with ADLs and functional mobility. Pt states she has AD but does not use them for ambulation. Pt has good family support and utilizes 2L O2 NC at night. Upon arrival, pt is alert and agreeable to OT evaluation. General UE screen revealed AROM and strength generally decreased but functional in BUEs. Pt completes functional transfers including toilet transfer with SBA and functional mobility within room with SBA. Pt reports she is at her baseline for ADLs. Defer to PT for decreased activity tolerance. Pt educated on breathing and energy conservation techniques. Pt left seated in chair with call bell within reach. Pt is currently functioning at/near baseline for ADLs. No indication for skilled OT at this time. Will d/c. This section established at most recent assessment PROBLEM LIST (Impairments causing functional limitations): 1. Decreased Activity Tolerance 2. Increased Shortness of Breath INTERVENTIONS PLANNED: (Benefits and precautions of occupational therapy have been discussed with the patient.) 1. None TREATMENT PLAN: Frequency/Duration: NA. Evaluate and discharge from OT services. RECOMMENDED REHABILITATION/EQUIPMENT: (at time of discharge pending progress): Due to the probability of continued deficits (see above) this patient will not likely need continued skilled occupational therapy after discharge. Equipment:  
? None at this time OCCUPATIONAL PROFILE AND HISTORY:  
History of Present Injury/Illness (Reason for Referral): 
See H&P. Past Medical History/Comorbidities: Ms. Olinda Kebede  has a past medical history of Anxiety, Arthritis, ASCAD - of the native vessel (2/27/2013), Asthma, CAD (coronary artery disease), Cancer ([address] Chronic pain, Claustrophobia, COPD, Current smoker, Degenerative joint disease, Depression, Diabetes ([address] Diverticulosis, DM (diabetes mellitus) w/o complication (5/70/5374), Dyslipidemia, Fatty liver, Fibromyalgia, GERD (gastroesophageal reflux disease), H/O echocardiogram (01/20/14), HTN (hypertension) - controlled, benign (2/27/2013), Hyperlipemia, Hypertension, Hypothyroidism, IBS (irritable bowel syndrome), Influenza A (H5N1) (2/4/2019), Lipid - hyperlipidemia other unsp dyslipidemia (6/9/2016), Macular degeneration, Mass of kidney, Murmur, Myalgia and myositis, unspecified, Obesity, JEREMÍAS (obstructive sleep apnea)- intolerant of CPAP (2/4/2019), Osteoarthrosis, unspecified whether generalized or localized, unspecified site, Psychiatric disorder, PUD (peptic ulcer disease) (1980), S/P total knee arthroplasty (6/22/2016), Seizures ([address] (12/2011), Sleep apnea, Status post total knee replacement (2/27/2013), Syncope and collapse (6/9/2016), Tobacco use disorder (6/9/2016), Urinary, incontinence, stress female, and Vertigo.  She also has no past medical history of Adverse effect of anesthesia, Aneurysm ([address] Arrhythmia, Autoimmune disease ([address] Chronic kidney disease, Coagulation defects, Dementia, Difficult intubation, Endocarditis, Heart failure ([facility] Ill-defined condition, Malignant hyperthermia due to anesthesia, Nausea & vomiting, Pseudocholinesterase deficiency, Rheumatic fever, Stroke ([address] Thromboembolus ([address] or Unspecified adverse effect of anesthesia. Ms. Mar Bruno  has a past surgical history that includes colonoscopy; hx knee arthroscopy (Right); hx carpal tunnel release (Right); hx ankle fracture tx (Right); hx appendectomy; hx knee replacement (Left); hx lap cholecystectomy; pr cardiac surg procedure unlist; hx breast lumpectomy (Bilateral); hx hysterectomy (1979); hx tubal ligation; and hx urological. 
Social History/Living Environment:  
Home Environment: Trailer/mobile home # Steps to Enter: 6 Rails to Enter: Yes One/Two Story Residence: One story Living Alone: No 
Support Systems: Family member(s), Child(keaton) Patient Expects to be Discharged to[de-identified] Trailer/mobile home Current DME Used/Available at Home: [employer], quad, [address] chair, Walker, rollator Tub or Shower Type: Tub/Shower combination Prior Level of Function/Work/Activity: At baseline, pt lives with family members and is independent with ADLs and functional mobility. Pt states she has AD but does not use them for ambulation. Pt has good family support and utilizes 2L O2 NC at night. Personal Factors:   
      Sex:  female Age:  70 y.o. Number of Personal Factors/Comorbidities that affect the Plan of Care: Brief history (0):  LOW COMPLEXITY ASSESSMENT OF OCCUPATIONAL PERFORMANCE[de-identified]  
Activities of Daily Living:  
Basic ADLs (From Assessment) Complex ADLs (From Assessment) Feeding: Independent Oral Facial Hygiene/Grooming: Modified Independent Bathing: Modified independent Upper Body Dressing: Modified independent Lower Body Dressing: Modified independent Toileting: Modified independent Instrumental ADL Meal Preparation: Moderate assistance Homemaking: Maximum assistance Grooming/Bathing/Dressing Activities of Daily Living Cognitive Retraining Safety/Judgement: Awareness of environment; Fall prevention; Insight into deficits Functional Transfers Bathroom Mobility: Stand-by assistance Toilet Transfer : Stand-by assistance Bed/Mat Mobility Rolling: Independent Supine to Sit: Supervision Sit to Supine: (left up in chair) Sit to Stand: Stand-by assistance Stand to Sit: Stand-by assistance Bed to Chair: Stand-by assistance Scooting: Independent Most Recent Physical Functioning:  
Gross Assessment: 
AROM: Generally decreased, functional(BUEs) Strength: Generally decreased, functional(BUEs) Coordination: Within functional limits(BUEs) Posture: 
Posture (WDL): Exceptions to [facility] Posture Assessment: Rounded shoulders Balance: 
Sitting: Intact Standing: Impaired Standing - Static: Good Standing - Dynamic : Fair Bed Mobility: 
Rolling: Independent Supine to Sit: Supervision Sit to Supine: (left up in chair) Scooting: Independent Wheelchair Mobility: 
  
Transfers: 
Sit to Stand: Stand-by assistance Stand to Sit: Stand-by assistance Bed to Chair: Stand-by assistance Patient Vitals for the past 6 hrs: 
 BP BP Patient Position SpO2 O2 Flow Rate (L/min) Pulse 03/22/19 1023   96 % 5 l/min   
03/22/19 1107 104/62 Sitting;Post activity 91 %  60  
03/22/19 1130   95 % 4 l/min   
03/22/19 1457 102/56 Sitting 92 %  62 Mental Status Neurologic State: Alert Orientation Level: Oriented X4 Cognition: Appropriate decision making, Appropriate for age attention/concentration, Appropriate safety awareness, Follows commands Perception: Appears intact Perseveration: No perseveration noted Safety/Judgement: Awareness of environment, Fall prevention, Insight into deficits Physical Skills Involved: 1. Activity Tolerance Defer to PT Cognitive Skills Affected (resulting in the inability to perform in a timely and safe manner): 1. None Psychosocial Skills Affected: 1. Habits/Routines Number of elements that affect the Plan of Care: 1-3:  LOW COMPLEXITY CLINICAL DECISION MAKIN[address] [place] [id number] AM-PAC 6 Clicks Daily Activity Inpatient Short Form How much help from another person does the patient currently need. .. Total A Lot A Little None 1. Putting on and taking off regular lower body clothing? ? 1   ? 2   ? 3   x? 4  
2. Bathing (including washing, rinsing, drying)? ? 1   ? 2   ? 3  x ? 4  
3. Toileting, which includes using toilet, bedpan or urinal?   ? 1   ? 2   ? 3   x? 4  
4. Putting on and taking off regular upper body clothing? ? 1   ? 2   ? 3   x? 4  
5. Taking care of personal grooming such as brushing teeth? ? 1   ? 2   ? 3   x? 4  
6. Eating meals? ? 1   ? 2   ? 3   x? 4  
© , Trustees of [address] [place] [address], under license to [employer]. All rights reserved Score:  Initial: 24 3/22/19 Most Recent: X (Date: -- ) Interpretation of Tool:  Represents activities that are increasingly more difficult (i.e. Bed mobility, Transfers, Gait). Medical Necessity:    
· NA. Reason for Services/Other Comments: 
· NA. Use of outcome tool(s) and clinical judgement create a POC that gives a: LOW COMPLEXITY  
 
 
 
TREATMENT:  
(In addition to Assessment/Re-Assessment sessions the following treatments were rendered) Pre-treatment Symptoms/Complaints:   
Pain: Initial:  
Pain Intensity 1: 0  Post Session:  same Assessment/Reassessment only, no treatment provided today Braces/Orthotics/Lines/Etc:  
· O2 Device: Hi flow nasal cannula Treatment/Session Assessment:   
Response to Treatment:  Assessment only · Interdisciplinary Collaboration:  
o Occupational Therapist 
o Registered Nurse · After treatment position/precautions:  
o Up in chair 
o Bed/Chair-wheels locked 
o Call light within reach 
o Legs reclined, needs within reach · Compliance with Program/Exercises: NA. 
 Recommendations/Intent for next treatment session: NA. Evaluate and discharge from OT services. Total Treatment Duration: OT Patient Time In/Time Out Time In: 9818 Time Out: 6853 Blanca Roach, OTR/L

## 2019-03-22 NOTE — PROGRESS NOTES
03/22/19 0757 Oxygen Therapy O2 Sat (%) 96 % Pulse via Oximetry 91 beats per minute O2 Device Hi flow nasal cannula O2 Flow Rate (L/min) 5 l/min 
(decreased from 6)

## 2019-03-22 NOTE — PROGRESS NOTES
Interdisciplinary team rounds were held 3/22/2019 with the following team members:Care Management, Occupational Therapy, Physician and . Anticipate discharge home when medically ready. Plan of care discussed. See clinical pathway and/or care plan for interventions and desired outcomes.

## 2019-03-22 NOTE — PROGRESS NOTES
Problem: Falls - Risk of 
Goal: *Absence of Falls Description Document Donny Thomas Fall Risk and appropriate interventions in the flowsheet. Outcome: Progressing Towards Goal 
  
Problem: Patient Education: Go to Patient Education Activity Goal: Patient/Family Education Outcome: Progressing Towards Goal

## 2019-03-22 NOTE — PROGRESS NOTES
Hospitalist Progress Note Admit Date:  3/21/2019 12:33 PM  
Name:  Julito Copeland Age:  70 y.o. 
:  1950 MRN:  [id number] PCP:  Casey Courtney MD 
Treatment Team: Attending Provider: Joel Clemente DO; Charge Nurse: Bryant Herrera; Occupational Therapist: Suresh Bundy OTR/ERROL; Utilization Review: Danny Flores; Care Manager: Natalie Mathias RN Subjective: As per HPI: 
 
\"Ms. Nelli Degroot is a 69 yo female with PMH of COPD on 2 L nocturnal O2, CAD, uterine cancer, DM II, HTN, JEREMÍAS does not use CPAP, smoker who presents with c/o generalized weakness and SOB worse than baseline X2 days. CXR showed mild pulmonary edema, BNP 28.  Pt recently DC in [place] for same. Intubated for COPD exacerbation in 2018 per pt. Is followed by Pulmonary in [address]. Was started on levaquin in ED. Pt denies CP, n/v/d, abd pain. \" 
 
2019- seen - sob wheezing - states she went home and took them medicines but she is still sick. . Feels she was discharged to soon- Wants to know if 2 cats at home could be triggering - pt still continues to smoke. . Does not think smoking is the issue- inhabitants of the house still smoke as well but smoke outside. .. 1 pack will last her 2-3 days on her own. 1 day with family members- she says she is going to try to quit Objective:  
 
Patient Vitals for the past 24 hrs: 
 Temp Pulse Resp BP SpO2  
19 1457 97.9 °F (36.6 °C) 62 18 102/56 92 % 19 1130     95 % 19 1107 97.9 °F (36.6 °C) 60 18 104/62 91 %  
19 1023     96 % 19 0757     96 % 19 0655 97.8 °F (36.6 °C) 64 19 113/63 95 % 19 0408 97.2 °F (36.2 °C) 64 22 104/48 92 % 19 0052 97.7 °F (36.5 °C) 69 26 114/54 92 % 19 2350     96 % 19     97 % 19 97.9 °F (36.6 °C) 79 24 124/71 92 % 19 1742 98.8 °F (37.1 °C) 83 20 128/70 93 % 19 1534     93 % Oxygen Therapy O2 Sat (%): 92 % (03/22/19 1457) Pulse via Oximetry: 90 beats per minute (03/22/19 1130) O2 Device: Hi flow nasal cannula (03/22/19 1130) O2 Flow Rate (L/min): 4 l/min(DECREASED FROM 5) (03/22/19 1130) No intake or output data in the 24 hours ending 03/22/19 1517 General:    Well nourished. Alert. CV:   RRR. No murmur, rub, or gallop. Lungs:   wheezing, rhonchi, or rales. Abdomen:   Soft, nontender, nondistended. Extremities: Warm and dry. No cyanosis or edema. Skin:     No rashes or jaundice. Data Review: 
I have reviewed all labs, meds, telemetry events, and studies from the last 24 hours. Recent Results (from the past 24 hour(s)) CULTURE, BLOOD Collection Time: 03/21/19  3:32 PM  
Result Value Ref Range Special Requests: NO SPECIAL REQUESTS 
LEFT 
HAND Culture result: NO GROWTH AFTER 16 HOURS    
POC LACTIC ACID Collection Time: 03/21/19  3:38 PM  
Result Value Ref Range Lactic Acid (POC) 1.46 0.5 - 1.9 mmol/L  
CULTURE, BLOOD Collection Time: 03/21/19  4:00 PM  
Result Value Ref Range Special Requests: LEFT HAND Culture result: NO GROWTH AFTER 16 HOURS    
GLUCOSE, POC Collection Time: 03/21/19  9:24 PM  
Result Value Ref Range Glucose (POC) 487 (HH) 65 - 100 mg/dL METABOLIC PANEL, COMPREHENSIVE Collection Time: 03/22/19  4:00 AM  
Result Value Ref Range Sodium 138 136 - 145 mmol/L Potassium 4.1 3.5 - 5.1 mmol/L Chloride 102 98 - 107 mmol/L  
 CO2 29 21 - 32 mmol/L Anion gap 7 7 - 16 mmol/L Glucose 338 (H) 65 - 100 mg/dL BUN 20 8 - 23 MG/DL Creatinine 0.88 0.6 - 1.0 MG/DL  
 GFR est AA >60 >60 ml/min/1.73m2 GFR est non-AA >60 >60 ml/min/1.73m2 Calcium 8.8 8.3 - 10.4 MG/DL Bilirubin, total 0.4 0.2 - 1.1 MG/DL  
 ALT (SGPT) 15 12 - 65 U/L  
 AST (SGOT) 13 (L) 15 - 37 U/L Alk. phosphatase 86 50 - 136 U/L Protein, total 7.5 6.3 - 8.2 g/dL Albumin 2.9 (L) 3.2 - 4.6 g/dL Globulin 4.6 (H) 2.3 - 3.5 g/dL A-G Ratio 0.6 (L) 1.2 - 3.5    
CBC WITH AUTOMATED DIFF Collection Time: 03/22/19  4:00 AM  
Result Value Ref Range WBC 13.6 (H) 4.3 - 11.1 K/uL  
 RBC 4.40 4.05 - 5.2 M/uL  
 HGB 12.6 11.7 - 15.4 g/dL HCT 41.1 35.8 - 46.3 % MCV 93.4 79.6 - 97.8 FL  
 MCH 28.6 26.1 - 32.9 PG  
 MCHC 30.7 (L) 31.4 - 35.0 g/dL  
 RDW 12.9 11.9 - 14.6 % PLATELET 170 168 - 186 K/uL MPV 11.1 9.4 - 12.3 FL ABSOLUTE NRBC 0.00 0.0 - 0.2 K/uL  
 DF AUTOMATED NEUTROPHILS 88 (H) 43 - 78 % LYMPHOCYTES 7 (L) 13 - 44 % MONOCYTES 3 (L) 4.0 - 12.0 % EOSINOPHILS 0 (L) 0.5 - 7.8 % BASOPHILS 0 0.0 - 2.0 % IMMATURE GRANULOCYTES 1 0.0 - 5.0 %  
 ABS. NEUTROPHILS 12.0 (H) 1.7 - 8.2 K/UL  
 ABS. LYMPHOCYTES 1.0 0.5 - 4.6 K/UL  
 ABS. MONOCYTES 0.4 0.1 - 1.3 K/UL  
 ABS. EOSINOPHILS 0.0 0.0 - 0.8 K/UL  
 ABS. BASOPHILS 0.0 0.0 - 0.2 K/UL  
 ABS. IMM. GRANS. 0.1 0.0 - 0.5 K/UL GLUCOSE, POC Collection Time: 03/22/19  6:53 AM  
Result Value Ref Range Glucose (POC) 304 (H) 65 - 100 mg/dL GLUCOSE, POC Collection Time: 03/22/19 10:58 AM  
Result Value Ref Range Glucose (POC) 369 (H) 65 - 100 mg/dL All Micro Results Procedure Component Value Units Date/Time CULTURE, BLOOD [[id number]] Collected:  03/21/19 1600 Order Status:  Completed Specimen:  Whole Blood Updated:  03/22/19 7266 Special Requests: LEFT HAND Culture result: NO GROWTH AFTER 16 HOURS     
 CULTURE, BLOOD [[id number]] Collected:  03/21/19 1532 Order Status:  Completed Specimen:  Whole Blood Updated:  03/22/19 0425 Special Requests: --     
  NO SPECIAL REQUESTS 
LEFT 
HAND Culture result: NO GROWTH AFTER 16 HOURS Current Meds: 
Current Facility-Administered Medications Medication Dose Route Frequency  HYDROcodone-acetaminophen (NORCO)  mg tablet 1 Tab  1 Tab Oral Q6H PRN  
 albuterol-ipratropium (DUO-NEB) 2.5 MG-0.5 MG/3 ML  3 mL Nebulization Q4H RT  
  ALPRAZolam (XANAX) tablet 1 mg  1 mg Oral TID PRN  
 aspirin chewable tablet 81 mg  81 mg Oral DAILY  clopidogrel (PLAVIX) tablet 75 mg  75 mg Oral DAILY  escitalopram oxalate (LEXAPRO) tablet 20 mg  20 mg Oral DAILY  fenofibrate (LOFIBRA) tablet 160 mg  160 mg Oral DAILY  fluticasone-salmeterol (ADVAIR) 250mcg-50mcg/puff (Patient Supplied)  1 Puff Inhalation BID  
 guaiFENesin ER (MUCINEX) tablet 1,200 mg  1,200 mg Oral BID  insulin glargine (LANTUS) injection 80 Units  80 Units SubCUTAneous QHS  levothyroxine (SYNTHROID) tablet 100 mcg  100 mcg Oral ACB  losartan (COZAAR) tablet 25 mg  25 mg Oral DAILY  metoprolol tartrate (LOPRESSOR) tablet 25 mg  25 mg Oral BID  niacin ER (NIASPAN) tablet 500 mg  500 mg Oral QHS  pantoprazole (PROTONIX) tablet 40 mg  40 mg Oral BID  rosuvastatin (CRESTOR) tablet 10 mg  10 mg Oral QHS  tiotropium (SPIRIVA) inhalation capsule 18 mcg  1 Cap Inhalation DAILY  sodium chloride (NS) flush 5-40 mL  5-40 mL IntraVENous Q8H  
 sodium chloride (NS) flush 5-40 mL  5-40 mL IntraVENous PRN  
 insulin lispro (HUMALOG) injection   SubCUTAneous AC&HS  acetaminophen (TYLENOL) tablet 650 mg  650 mg Oral Q4H PRN  
 ondansetron (ZOFRAN) injection 4 mg  4 mg IntraVENous Q4H PRN  
 senna-docusate (PERICOLACE) 8.6-50 mg per tablet 1 Tab  1 Tab Oral BID PRN  
 nicotine (NICODERM CQ) 7 mg/24 hr patch 1 Patch  1 Patch TransDERmal Q24H  
 tuberculin injection 5 Units  5 Units IntraDERMal ONCE  
 enoxaparin (LOVENOX) injection 40 mg  40 mg SubCUTAneous Q24H  
 levoFLOXacin (LEVAQUIN) 750 mg in D5W IVPB  750 mg IntraVENous Q24H  nystatin (MYCOSTATIN) 100,000 unit/gram powder   Topical BID  methylPREDNISolone (PF) (Solu-MEDROL) injection 40 mg  40 mg IntraVENous Q12H Other Studies (last 24 hours): No results found. Assessment and Plan:  
 
Hospital Problems as of 3/22/2019 Date Reviewed: 2/8/2019 Codes Class Noted - Resolved POA COPD exacerbation ([facility] [address] ICD-10-CM: J44.1 ICD-9-CM: 491.21  2/6/2019 - Present Unknown PLAN:   
· Copd exacerbation continue current medical mgt; consult pulm · Acute on chronic resp failure- wean o2 as tolerated · Dm- uncontrolled on steroids- titrate her insulin · Continue appropriate home meds · further workup and mgt based on her clinical course DC planning/Dispo:  penidng clinical improvement DVT ppx:  lovenox Signed: 
Ayse Robles MD

## 2019-03-22 NOTE — PROGRESS NOTES
Problem: Falls - Risk of 
Goal: *Absence of Falls Description Document Kirti Harrell Fall Risk and appropriate interventions in the flowsheet. Outcome: Progressing Towards Goal 
  
Problem: Patient Education: Go to Patient Education Activity Goal: Patient/Family Education Outcome: Progressing Towards Goal 
  
Problem: Chronic Obstructive Pulmonary Disease (COPD) Goal: *Oxygen saturation during activity within specified parameters Outcome: Progressing Towards Goal 
Goal: *Able to remain out of bed as prescribed Outcome: Progressing Towards Goal 
Goal: *Absence of hypoxia Outcome: Progressing Towards Goal 
Goal: *Optimize nutritional status Outcome: Progressing Towards Goal 
  
Problem: Patient Education: Go to Patient Education Activity Goal: Patient/Family Education Outcome: Progressing Towards Goal 
  
Problem: Patient Education: Go to Patient Education Activity Goal: Patient/Family Education Outcome: Progressing Towards Goal

## 2019-03-23 PROBLEM — J96.00 ACUTE RESPIRATORY FAILURE (HCC): Status: ACTIVE | Noted: 2019-02-04

## 2019-03-23 PROBLEM — E78.5 DYSLIPIDEMIA: Chronic | Status: ACTIVE | Noted: 2017-03-23

## 2019-03-23 LAB
GLUCOSE BLD STRIP.AUTO-MCNC: 297 MG/DL (ref 65–100)
GLUCOSE BLD STRIP.AUTO-MCNC: 321 MG/DL (ref 65–100)
GLUCOSE BLD STRIP.AUTO-MCNC: 326 MG/DL (ref 65–100)
GLUCOSE BLD STRIP.AUTO-MCNC: 367 MG/DL (ref 65–100)
GLUCOSE BLD STRIP.AUTO-MCNC: 384 MG/DL (ref 65–100)
MM INDURATION POC: 0 MM (ref 0–5)
PPD POC: NEGATIVE NEGATIVE

## 2019-03-23 PROCEDURE — 74011250636 HC RX REV CODE- 250/636: Performed by: NURSE PRACTITIONER

## 2019-03-23 PROCEDURE — 36415 COLL VENOUS BLD VENIPUNCTURE: CPT

## 2019-03-23 PROCEDURE — 82962 GLUCOSE BLOOD TEST: CPT

## 2019-03-23 PROCEDURE — 94640 AIRWAY INHALATION TREATMENT: CPT

## 2019-03-23 PROCEDURE — 74011250637 HC RX REV CODE- 250/637: Performed by: NURSE PRACTITIONER

## 2019-03-23 PROCEDURE — 74011250637 HC RX REV CODE- 250/637: Performed by: FAMILY MEDICINE

## 2019-03-23 PROCEDURE — 74011636637 HC RX REV CODE- 636/637: Performed by: INTERNAL MEDICINE

## 2019-03-23 PROCEDURE — 74011000250 HC RX REV CODE- 250: Performed by: NURSE PRACTITIONER

## 2019-03-23 PROCEDURE — 74011250636 HC RX REV CODE- 250/636: Performed by: INTERNAL MEDICINE

## 2019-03-23 PROCEDURE — 77030027138 HC INCENT SPIROMETER -A

## 2019-03-23 PROCEDURE — 77010033678 HC OXYGEN DAILY

## 2019-03-23 PROCEDURE — 65270000029 HC RM PRIVATE

## 2019-03-23 PROCEDURE — 74011250636 HC RX REV CODE- 250/636: Performed by: FAMILY MEDICINE

## 2019-03-23 PROCEDURE — 94760 N-INVAS EAR/PLS OXIMETRY 1: CPT

## 2019-03-23 PROCEDURE — 87040 BLOOD CULTURE FOR BACTERIA: CPT

## 2019-03-23 PROCEDURE — 99223 1ST HOSP IP/OBS HIGH 75: CPT | Performed by: INTERNAL MEDICINE

## 2019-03-23 RX ORDER — INSULIN LISPRO 100 [IU]/ML
INJECTION, SOLUTION INTRAVENOUS; SUBCUTANEOUS
Status: DISCONTINUED | OUTPATIENT
Start: 2019-03-23 | End: 2019-03-26 | Stop reason: HOSPADM

## 2019-03-23 RX ORDER — ALPRAZOLAM 0.5 MG/1
1 TABLET ORAL
Status: DISCONTINUED | OUTPATIENT
Start: 2019-03-23 | End: 2019-03-26 | Stop reason: HOSPADM

## 2019-03-23 RX ORDER — INSULIN GLARGINE 100 [IU]/ML
98 INJECTION, SOLUTION SUBCUTANEOUS
Status: DISCONTINUED | OUTPATIENT
Start: 2019-03-23 | End: 2019-03-26 | Stop reason: HOSPADM

## 2019-03-23 RX ORDER — BUDESONIDE 0.5 MG/2ML
500 INHALANT ORAL
Status: DISCONTINUED | OUTPATIENT
Start: 2019-03-23 | End: 2019-03-26 | Stop reason: HOSPADM

## 2019-03-23 RX ORDER — IBUPROFEN 200 MG
1 TABLET ORAL DAILY
Status: DISCONTINUED | OUTPATIENT
Start: 2019-03-24 | End: 2019-03-26 | Stop reason: HOSPADM

## 2019-03-23 RX ORDER — INSULIN GLARGINE 100 [IU]/ML
94 INJECTION, SOLUTION SUBCUTANEOUS
Status: DISCONTINUED | OUTPATIENT
Start: 2019-03-23 | End: 2019-03-23

## 2019-03-23 RX ORDER — ALBUTEROL SULFATE 0.83 MG/ML
2.5 SOLUTION RESPIRATORY (INHALATION)
Status: DISCONTINUED | OUTPATIENT
Start: 2019-03-23 | End: 2019-03-26 | Stop reason: HOSPADM

## 2019-03-23 RX ADMIN — INSULIN LISPRO 15 UNITS: 100 INJECTION, SOLUTION INTRAVENOUS; SUBCUTANEOUS at 22:21

## 2019-03-23 RX ADMIN — TIOTROPIUM BROMIDE 18 MCG: 18 CAPSULE ORAL; RESPIRATORY (INHALATION) at 09:15

## 2019-03-23 RX ADMIN — ESCITALOPRAM OXALATE 20 MG: 10 TABLET ORAL at 08:29

## 2019-03-23 RX ADMIN — IPRATROPIUM BROMIDE AND ALBUTEROL SULFATE 3 ML: .5; 3 SOLUTION RESPIRATORY (INHALATION) at 11:55

## 2019-03-23 RX ADMIN — PANTOPRAZOLE SODIUM 40 MG: 40 TABLET, DELAYED RELEASE ORAL at 17:11

## 2019-03-23 RX ADMIN — METHYLPREDNISOLONE SODIUM SUCCINATE 40 MG: 125 INJECTION, POWDER, FOR SOLUTION INTRAMUSCULAR; INTRAVENOUS at 08:29

## 2019-03-23 RX ADMIN — GUAIFENESIN 1200 MG: 600 TABLET, EXTENDED RELEASE ORAL at 08:29

## 2019-03-23 RX ADMIN — ENOXAPARIN SODIUM 40 MG: 40 INJECTION SUBCUTANEOUS at 18:35

## 2019-03-23 RX ADMIN — GUAIFENESIN 1200 MG: 600 TABLET, EXTENDED RELEASE ORAL at 17:11

## 2019-03-23 RX ADMIN — LEVOFLOXACIN 750 MG: 5 INJECTION, SOLUTION INTRAVENOUS at 15:38

## 2019-03-23 RX ADMIN — ALPRAZOLAM 1 MG: 0.5 TABLET ORAL at 22:30

## 2019-03-23 RX ADMIN — Medication 10 ML: at 13:12

## 2019-03-23 RX ADMIN — INSULIN LISPRO 9 UNITS: 100 INJECTION, SOLUTION INTRAVENOUS; SUBCUTANEOUS at 08:29

## 2019-03-23 RX ADMIN — CLOPIDOGREL BISULFATE 75 MG: 75 TABLET, FILM COATED ORAL at 08:29

## 2019-03-23 RX ADMIN — Medication 10 ML: at 05:55

## 2019-03-23 RX ADMIN — ALPRAZOLAM 1 MG: 0.5 TABLET ORAL at 13:12

## 2019-03-23 RX ADMIN — METOPROLOL TARTRATE 25 MG: 25 TABLET ORAL at 08:29

## 2019-03-23 RX ADMIN — LOSARTAN POTASSIUM 25 MG: 25 TABLET ORAL at 08:29

## 2019-03-23 RX ADMIN — ROSUVASTATIN CALCIUM 10 MG: 5 TABLET, FILM COATED ORAL at 21:32

## 2019-03-23 RX ADMIN — INSULIN LISPRO 20 UNITS: 100 INJECTION, SOLUTION INTRAVENOUS; SUBCUTANEOUS at 17:14

## 2019-03-23 RX ADMIN — PANTOPRAZOLE SODIUM 40 MG: 40 TABLET, DELAYED RELEASE ORAL at 08:29

## 2019-03-23 RX ADMIN — NYSTATIN: 100000 POWDER TOPICAL at 08:32

## 2019-03-23 RX ADMIN — METOPROLOL TARTRATE 25 MG: 25 TABLET ORAL at 17:11

## 2019-03-23 RX ADMIN — BUDESONIDE 500 MCG: 0.5 INHALANT RESPIRATORY (INHALATION) at 20:10

## 2019-03-23 RX ADMIN — INSULIN LISPRO 12 UNITS: 100 INJECTION, SOLUTION INTRAVENOUS; SUBCUTANEOUS at 12:29

## 2019-03-23 RX ADMIN — IPRATROPIUM BROMIDE AND ALBUTEROL SULFATE 3 ML: .5; 3 SOLUTION RESPIRATORY (INHALATION) at 09:15

## 2019-03-23 RX ADMIN — ALBUTEROL SULFATE 2.5 MG: 2.5 SOLUTION RESPIRATORY (INHALATION) at 20:10

## 2019-03-23 RX ADMIN — LEVOTHYROXINE SODIUM 100 MCG: 100 TABLET ORAL at 05:53

## 2019-03-23 RX ADMIN — ASPIRIN 81 MG 81 MG: 81 TABLET ORAL at 08:29

## 2019-03-23 RX ADMIN — METHYLPREDNISOLONE SODIUM SUCCINATE 40 MG: 125 INJECTION, POWDER, FOR SOLUTION INTRAMUSCULAR; INTRAVENOUS at 17:12

## 2019-03-23 RX ADMIN — INSULIN GLARGINE 98 UNITS: 100 INJECTION, SOLUTION SUBCUTANEOUS at 22:21

## 2019-03-23 RX ADMIN — FENOFIBRATE 160 MG: 160 TABLET ORAL at 08:29

## 2019-03-23 RX ADMIN — ALBUTEROL SULFATE 2.5 MG: 2.5 SOLUTION RESPIRATORY (INHALATION) at 16:02

## 2019-03-23 NOTE — PROGRESS NOTES
03/23/19 0915 Oxygen Therapy O2 Sat (%) 96 % Pulse via Oximetry 60 beats per minute O2 Device Nasal cannula O2 Flow Rate (L/min) 4 l/min 
(decreased to 3L)

## 2019-03-23 NOTE — PROGRESS NOTES
03/23/19 1155 Oxygen Therapy O2 Sat (%) 96 % Pulse via Oximetry 65 beats per minute O2 Device Nasal cannula O2 Flow Rate (L/min) 3 l/min 
(decreased to 2L)

## 2019-03-23 NOTE — CONSULTS
CONSULT NOTE    Monse Pickering    3/23/2019    Date of Admission:  3/21/2019    The patient's chart is reviewed and the patient is discussed with the staff. Subjective:     Patient is a 70 y.o.  female seen and evaluated at the request of Dr. Melissa Harper. Patient has a history of morbid obesity, anxiety/depression, DM, GERD, IBS,  Hypothyroidism, uterine CA, HTN, HL, CAD s/p PCI, JEREMÍAS intolerant of CPAP and maintained on O2 at 3lpm with sleep, COPD, chronic respiratory failure, and tobacco abuse (~50 pack year history). She was admitted in 2/4-2/8/2019 with Influenza A and required BIPAP during her admission. She was to be seen by our office in follow up but has not yet done so (apparently follows with Pulmonary in [place])    Patient states that she never fully recovered from her last hospitalization. She completed her abx/steroids but never regained her energy. She has several sick family members who live with her. She states that she has been increasingly sob for the last several days and agreed to come to the ER for further evaluation. EMS was called and she was brought in. She reports ongoing / worsening sob since discharge last month as well as generalized weakness, fatigue, and cough productive of yellow mucus. She continues to smoke 0.5 ppd. CXR with mild pulmonary edema, BNP 28. She was admitted by the Hospitalist service, started on nebs, steroids, and abx. We are consulted to assist with her care. AF, currently on o2 at 2 lpm with o2 sat 96%. WBC 13.6. Apparently patient lives in very poor conditions that are filthy and that other family living there use drugs. She has had recurrent lice.          Review of Systems  Constitutional: negative  Eyes: negative  Ears, nose, mouth, throat, and face: positive for sinus drainage  Respiratory: positive for cough, sputum, wheezing or dyspnea on exertion  Cardiovascular: negative  Gastrointestinal: negative  Genitourinary:negative  Integument/breast: negative  Hematologic/lymphatic: negative  Musculoskeletal:negative  Neurological: negative  Behavioral/Psych: negative    Patient Active Problem List   Diagnosis Code    DM (diabetes mellitus) w/o complication O09.1    HTN (hypertension) - controlled, benign I10    COPD (chronic obstructive pulmonary disease) ([facility] J44.9    Hypothyroidism E03.9    ASCAD - of the native vessel I25.110    Hypotension I95.9    Esophageal reflux K21.9    Stable angina (HCC) I20.8    Chronic total occlusion of coronary artery(414.2) I25.82    Cystocele KTA9091    Female stress incontinence N39.3    Lipid - hyperlipidemia other unsp dyslipidemia E78.5    Syncope and collapse R55    Osteoarthritis M19.90    Dyslipidemia E78.5    Acute on chronic respiratory failure with hypoxia and hypercapnia ([facility]) J96.21, Z72.86    Head lice infestation R66.8    Tobacco abuse Z72.0    Nocturnal hypoxemia G47.34    Fall W19. XXXA    Bradycardia R00.1    Morbid obesity ([facility] E66.01    Hyponatremia E87.1    Influenza A (H5N1) J09. X2    JEREMÍAS (obstructive sleep apnea)- intolerant of CPAP G47.33    Acute on chronic respiratory failure (HCC) J96.20    COPD exacerbation ([facility]) F50.9    Lice H39.4       Prior to Admission Medications   Prescriptions Last Dose Informant Patient Reported? Taking? ALPRAZolam (XANAX) 2 mg tablet 3/20/2019 at Unknown time  No Yes   Sig: Take 0.5 Tabs by mouth three (3) times daily as needed for Anxiety. Max Daily Amount: 3 mg. Take / use AM day of surgery  per anesthesia protocols. Indications: ANXIETY WITH DEPRESSION   Patient taking differently: Take 2 mg by mouth four (4) times daily as needed for Anxiety. Take / use AM day of surgery  per anesthesia protocols. HYDROcodone-acetaminophen (NORCO)  mg tablet 3/14/2019 at Unknown time  Yes Yes   Sig: Take 1 Tab by mouth every four (4) hours as needed for Pain.    OXYGEN-AIR DELIVERY SYSTEMS 3/20/2019 at Unknown time  Yes Yes   Si L by Nasal route as needed (SOB). albuterol (PROAIR HFA) 90 mcg/actuation inhaler 3/21/2019 at Unknown time  Yes Yes   Sig: Take 2 Puffs by inhalation every four (4) hours as needed. Take / use AM day of surgery  per anesthesia protocols if needed   albuterol-ipratropium (DUO-NEB) 2.5 mg-0.5 mg/3 ml nebu 3/20/2019 at Unknown time  No Yes   Sig: 3 mL by Nebulization route every six (6) hours as needed. aspirin 81 mg tablet 3/20/2019 at Unknown time  Yes Yes   Sig: Take 81 mg by mouth daily. Take / use AM day of surgery  per anesthesia protocols. clopidogrel (PLAVIX) 75 mg tab 3/20/2019 at Unknown time  No Yes   Sig: Take 1 Tab by mouth daily. escitalopram (LEXAPRO) 20 mg tablet 3/20/2019 at Unknown time  Yes Yes   Sig: Take 20 mg by mouth daily. Take / use AM day of surgery  per anesthesia protocols. Indications: ANXIETY WITH DEPRESSION   fenofibrate micronized (LOFIBRA) 200 mg capsule 3/20/2019 at Unknown time  No Yes   Sig: TAKE ONE CAPSULE BY MOUTH EACH NIGHT AT BEDTIME FOR TRIGLYCERIDES AND CHOLESTEROL. fluticasone-salmeterol (ADVAIR) 250-50 mcg/dose diskus inhaler 3/20/2019 at Unknown time  No Yes   Sig: Take 1 Puff by inhalation two (2) times a day. guaiFENesin (MUCINEX) 1,200 mg Ta12 ER tablet Unknown at Unknown time  Yes No   Sig: Take 1,200 mg by mouth two (2) times a day. Indications: COLD SYMPTOMS, Cough   insulin glargine (LANTUS) 100 unit/mL injection 3/20/2019 at Unknown time  Yes Yes   Si Units by SubCUTAneous route nightly. Indications: TYPE 2 DIABETES MELLITUS   insulin lispro protamine/insulin lispro (HUMALOG MIX 75-25,U-100,INSULN) 100 unit/mL (75-25) injection 3/20/2019 at Unknown time  Yes Yes   Si Units by SubCUTAneous route Daily (before breakfast).    insulin lispro protamine/insulin lispro (HUMALOG MIX 75-25,U-100,INSULN) 100 unit/mL (75-25) injection 3/20/2019 at Unknown time  Yes Yes   Si Units by SubCUTAneous route daily (with dinner). levothyroxine (SYNTHROID) 100 mcg tablet 3/20/2019 at Unknown time  Yes Yes   Sig: Take 100 mcg by mouth Daily (before breakfast). Per anesthesia protocol:instructed to take am of surgery. Indications: HYPOTHYROIDISM   loperamide (IMMODIUM) 2 mg tablet 3/14/2019 at Unknown time  Yes Yes   Sig: Take 2 mg by mouth four (4) times daily as needed for Diarrhea.   losartan (COZAAR) 25 mg tablet 3/20/2019 at Unknown time  No Yes   Sig: TAKE ONE TABLET BY MOUTH EVERY DAY FOR BLOOD PRESSURE   meclizine (ANTIVERT) 25 mg tablet 3/14/2019 at Unknown time  Yes Yes   Sig: Take 25 mg by mouth three (3) times daily as needed. Indications: VERTIGO   metoprolol tartrate (LOPRESSOR) 25 mg tablet 3/20/2019 at Unknown time  No Yes   Sig: Take 1 Tab by mouth two (2) times a day. nitroglycerin (NITROSTAT) 0.4 mg SL tablet Not Taking at Unknown time  Yes No   Si.4 mg by SubLINGual route every five (5) minutes as needed. Take / use AM day of surgery  per anesthesia protocols if needed   pantoprazole (PROTONIX) 40 mg tablet 3/20/2019 at Unknown time  Yes Yes   Sig: Take 40 mg by mouth two (2) times a day. Take / use AM day of surgery  per anesthesia protocols. Indications: GASTROESOPHAGEAL REFLUX   rosuvastatin (CRESTOR) 10 mg tablet 3/20/2019 at Unknown time  Yes Yes   Sig: Take 10 mg by mouth nightly. Indications: HYPERCHOLESTEROLEMIA   tiotropium (SPIRIVA) 18 mcg inhalation capsule 3/20/2019 at Unknown time  No Yes   Sig: Take 1 Cap by inhalation daily.       Facility-Administered Medications: None       Past Medical History:   Diagnosis Date    Anxiety     managed with medication     Arthritis     ASCAD - of the native vessel 2013    Asthma     CAD (coronary artery disease)     stents x 3    Cancer (HCC)     hx uterine cancer    Chronic pain     d/t fibromyalgia    Claustrophobia     COPD     PRN inhaler; uses once every 2-3 months    Current smoker     Degenerative joint disease     Depression     managed with medication     Diabetes ([facility]     type 2; normal fasting bs (  );  Insulin dependent; checks bs 4 times per day    Diverticulosis     managed with diet     DM (diabetes mellitus) w/o complication 4/47/1668    Dyslipidemia     Fatty liver     Fibromyalgia     GERD (gastroesophageal reflux disease)     controlled w/med    H/O echocardiogram 01/20/14    EF >69.7%    HTN (hypertension) - controlled, benign 2/27/2013    Hyperlipemia     managed with medication     Hypertension     controlled w/med    Hypothyroidism     managed with medication     IBS (irritable bowel syndrome)     w/diverticulitis    Influenza A (H5N1) 2/4/2019    Lipid - hyperlipidemia other unsp dyslipidemia 6/9/2016    Macular degeneration     Mass of kidney     Murmur     monitored by Cardiologist, Coy Joseph; last echo 1/20/14    Myalgia and myositis, unspecified     no present treatment     Obesity     BMI 39.1    JEREMÍAS (obstructive sleep apnea)- intolerant of CPAP 2/4/2019    Osteoarthrosis, unspecified whether generalized or localized, unspecified site     no present treatment     Psychiatric disorder     anxiety/depression    PUD (peptic ulcer disease) 1980    S/P total knee arthroplasty 6/22/2016    Seizures ([facility] 12/2011    s/p stopping xanax abruptly    Sleep apnea     noncompliant with CPAP    Status post total knee replacement 2/27/2013    Syncope and collapse 6/9/2016    Tobacco use disorder 6/9/2016    Urinary, incontinence, stress female     w/ cystocele-rectocele repair    Vertigo     managed with prn medication      Past Surgical History:   Procedure Laterality Date    CARDIAC SURG PROCEDURE UNLIST      3 caths total; stents x3, last stent 02/2014    COLONOSCOPY      HX ANKLE FRACTURE TX Right     repair with hardware    HX APPENDECTOMY      HX BREAST LUMPECTOMY Bilateral     HX CARPAL TUNNEL RELEASE Right     HX HYSTERECTOMY  1979    HX KNEE ARTHROSCOPY Right     HX KNEE REPLACEMENT Left     HX LAP CHOLECYSTECTOMY      HX TUBAL LIGATION      HX UROLOGICAL      sling     Social History     Socioeconomic History    Marital status:      Spouse name: Not on file    Number of children: Not on file    Years of education: Not on file    Highest education level: Not on file   Occupational History    Not on file   Social Needs    Financial resource strain: Not on file    Food insecurity:     Worry: Not on file     Inability: Not on file    Transportation needs:     Medical: Not on file     Non-medical: Not on file   Tobacco Use    Smoking status: Current Every Day Smoker     Packs/day: 0.25     Years: 53.00     Pack years: 13.25    Smokeless tobacco: Never Used   Substance and Sexual Activity    Alcohol use: No    Drug use: No    Sexual activity: Not on file   Lifestyle    Physical activity:     Days per week: Not on file     Minutes per session: Not on file    Stress: Not on file   Relationships    Social connections:     Talks on phone: Not on file     Gets together: Not on file     Attends Restorationism service: Not on file     Active member of club or organization: Not on file     Attends meetings of clubs or organizations: Not on file     Relationship status: Not on file    Intimate partner violence:     Fear of current or ex partner: Not on file     Emotionally abused: Not on file     Physically abused: Not on file     Forced sexual activity: Not on file   Other Topics Concern    Not on file   Social History Narrative    Pt lives with son, DIL, and 3 grandchildren. She has one dog. She is retired.       Family History   Problem Relation Age of Onset    Heart Attack Mother         MI age 70    Diabetes Mother     Heart Disease Mother     Heart Disease Sister     Heart Failure Sister 61        cabg    Diabetes Sister     Heart Disease Brother     Heart Attack Brother 28        mi    Cancer Brother     Heart Failure Sister 61 cabg    Heart Disease Sister     Cancer Father     Alzheimer Father     Heart Attack Brother      Allergies   Allergen Reactions    Lisinopril Swelling     Throat swelling      Oxycodone Rash       Current Facility-Administered Medications   Medication Dose Route Frequency    insulin glargine (LANTUS) injection 94 Units  94 Units SubCUTAneous QHS    HYDROcodone-acetaminophen (NORCO)  mg tablet 1 Tab  1 Tab Oral Q6H PRN    insulin lispro (HUMALOG) injection   SubCUTAneous AC&HS    albuterol-ipratropium (DUO-NEB) 2.5 MG-0.5 MG/3 ML  3 mL Nebulization Q4H RT    ALPRAZolam (XANAX) tablet 1 mg  1 mg Oral TID PRN    aspirin chewable tablet 81 mg  81 mg Oral DAILY    clopidogrel (PLAVIX) tablet 75 mg  75 mg Oral DAILY    escitalopram oxalate (LEXAPRO) tablet 20 mg  20 mg Oral DAILY    fenofibrate (LOFIBRA) tablet 160 mg  160 mg Oral DAILY    fluticasone-salmeterol (ADVAIR) 250mcg-50mcg/puff (Patient Supplied)  1 Puff Inhalation BID    guaiFENesin ER (MUCINEX) tablet 1,200 mg  1,200 mg Oral BID    levothyroxine (SYNTHROID) tablet 100 mcg  100 mcg Oral ACB    losartan (COZAAR) tablet 25 mg  25 mg Oral DAILY    metoprolol tartrate (LOPRESSOR) tablet 25 mg  25 mg Oral BID    niacin ER (NIASPAN) tablet 500 mg  500 mg Oral QHS    pantoprazole (PROTONIX) tablet 40 mg  40 mg Oral BID    rosuvastatin (CRESTOR) tablet 10 mg  10 mg Oral QHS    tiotropium (SPIRIVA) inhalation capsule 18 mcg  1 Cap Inhalation DAILY    sodium chloride (NS) flush 5-40 mL  5-40 mL IntraVENous Q8H    sodium chloride (NS) flush 5-40 mL  5-40 mL IntraVENous PRN    acetaminophen (TYLENOL) tablet 650 mg  650 mg Oral Q4H PRN    ondansetron (ZOFRAN) injection 4 mg  4 mg IntraVENous Q4H PRN    senna-docusate (PERICOLACE) 8.6-50 mg per tablet 1 Tab  1 Tab Oral BID PRN    nicotine (NICODERM CQ) 7 mg/24 hr patch 1 Patch  1 Patch TransDERmal Q24H    enoxaparin (LOVENOX) injection 40 mg  40 mg SubCUTAneous Q24H    levoFLOXacin (LEVAQUIN) 750 mg in D5W IVPB  750 mg IntraVENous Q24H    nystatin (MYCOSTATIN) 100,000 unit/gram powder   Topical BID    methylPREDNISolone (PF) (Solu-MEDROL) injection 40 mg  40 mg IntraVENous Q12H       Objective:     Vitals:    03/23/19 0829 03/23/19 0915 03/23/19 1125 03/23/19 1155   BP:   129/72    Pulse: 65  (!) 58    Resp:   16    Temp:   97.9 °F (36.6 °C)    SpO2:  96% 95% 96%   Weight:       Height:           PHYSICAL EXAM     Constitutional:  the patient is well developed and in no acute distress  EENMT:  Sclera clear, pupils equal, oral mucosa moist  Respiratory: diffuse wheezing, o2 at 2 lpm  Cardiovascular:  RRR without M,G,R  Gastrointestinal: soft and non-tender; with positive bowel sounds. Musculoskeletal: warm without cyanosis. There is no lower leg edema. Skin:  no jaundice or rashes, no open wounds   Neurologic: no gross neuro deficits     Psychiatric:  alert and oriented x 3    CXR:    3/21             Recent Labs     03/22/19  0400 03/21/19  1242   WBC 13.6* 14.1*   HGB 12.6 12.7   HCT 41.1 39.5    203     Recent Labs     03/22/19  0400 03/21/19  1307    137   K 4.1 4.4    100   * 287*   CO2 29 30   BUN 20 12   CREA 0.88 0.98   CA 8.8 9.0   ALB 2.9* 3.1*   TBILI 0.4 0.7   ALT 15 10*   SGOT 13* 24     No results for input(s): PH, PCO2, PO2, HCO3 in the last 72 hours. No results for input(s): LCAD, LAC in the last 72 hours.     Assessment:  (Medical Decision Making)     Hospital Problems  Date Reviewed: 2/8/2019          Codes Class Noted POA    * (Principal) COPD exacerbation ([facility] ICD-10-CM: J44.1  ICD-9-CM: 491.21  2/6/2019 Unknown    Diffuse wheezing      JEREMÍAS (obstructive sleep apnea)- intolerant of CPAP (Chronic) ICD-10-CM: G47.33  ICD-9-CM: 327.23  2/4/2019 Yes    Wears O2 at 2 lpm      Acute Respiratory failure    Patient maintained on O2 at 2lpm with sleep  Currently requiring o2 at 2 lpm during the day to maintain sats in the 90s      Morbid obesity ([facility] (Chronic) ICD-10-CM: E66.01  ICD-9-CM: 278.01  4/17/2018 Yes    Lending to the complexity of care      DM (diabetes mellitus) w/o complication (Chronic) XSA-52-HC: E11.9  ICD-9-CM: 250.00  4/15/2018 Yes    lantus / SSI  BS range 234-387  Steroids contributing to elevated readings      Tobacco abuse (Chronic) ICD-10-CM: Z72.0  ICD-9-CM: 305.1  4/15/2018 Yes    Ongoing   Continues to smoke 0.5 ppd       Micro:  3/21 BC: GPC in clusters in 1/2  3/23 BC: pending    Plan:  (Medical Decision Making)     --Duo-neb >>>albuterol / Ana Salome / advair >>>pulmicort  --levaquin  --solumedrol 40 mg IV q 12 hours >>>increase to q 6 hours  --mucinex  --smoking cessation  --agree with continued weaning of O2. Currently on o2 at 2 lpm via NC.    --PT following for mobility - ambulated 250' yesterday    More than 50% of the time documented was spent in face-to-face contact with the patient and in the care of the patient on the floor/unit where the patient is located. Thank you very much for this referral.  We appreciate the opportunity to participate in this patient's care. Will follow along with above stated plan. Latrice Conley NP   Lungs:  Diffuse wheezes  Heart:  RRR with no Murmur/Rubs/Gallops    Additional Comments: Will repeat cxr tomorrow,iv steroids, bronchodilators, smoking cessation    I have spoken with and examined the patient. I agree with the above assessment and plan as documented.     Nidia Rhodes MD

## 2019-03-23 NOTE — PROGRESS NOTES
Patient progressing towards baseline mobility. Out of bed to bathe, eat, and and go to the restroom. Patient also eating over 50 % of her meals.

## 2019-03-23 NOTE — PROGRESS NOTES
Dr. Monty Head gave verbal orders to change Xanax 1 mg PO to 4x daily PRN and to order the next Nictoine patch to 21 mg transdermal.

## 2019-03-23 NOTE — PROGRESS NOTES
Hospitalist Progress Note Admit Date:  3/21/2019 12:33 PM  
Name:  Eamon Rosenberg Age:  70 y.o. 
:  1950 MRN:  [id number] PCP:  Lauri Cole MD 
Treatment Team: Attending Provider: Grace Spring DO; Utilization Review: Luigi García; Care Manager: Chidi Lamas RN Subjective: As per HPI: 
 
\"Ms. Kaz Llanes is a 69 yo female with PMH of COPD on 2 L nocturnal O2, CAD, uterine cancer, DM II, HTN, JEREMÍAS does not use CPAP, smoker who presents with c/o generalized weakness and SOB worse than baseline X2 days. CXR showed mild pulmonary edema, BNP 28.  Pt recently DC in Feb for same. Intubated for COPD exacerbation in 2018 per pt. Is followed by Pulmonary in [address]. Was started on levaquin in ED. Pt denies CP, n/v/d, abd pain. \" 
 
2019- seen - sob wheezing - states she went home and took them medicines but she is still sick. . Feels she was discharged to soon- Wants to know if 2 cats at home could be triggering - pt still continues to smoke. . Does not think smoking is the issue- inhabitants of the house still smoke as well but smoke outside. .. 1 pack will last her 2-3 days on her own. 1 day with family members- she says she is going to try to quit 
 
2019- seen - sob; wheezing; no adverse overnight events reported Objective:  
 
Patient Vitals for the past 24 hrs: 
 Temp Pulse Resp BP SpO2  
19 0829  65     
19 0751 98.3 °F (36.8 °C) 61 16 106/64   
19 0339 98.3 °F (36.8 °C) 62 18 123/61 92 % 19 2333     95 % 19 2331 97.4 °F (36.3 °C) 68 18 (!) 135/94 93 % 19 1950     96 % 19 1936 98.7 °F (37.1 °C) 61 18 113/54 94 % 19 1457 97.9 °F (36.6 °C) 62 18 102/56 92 % 19 1450     93 % 19 1130     95 % 19 1107 97.9 °F (36.6 °C) 60 18 104/62 91 %  
19 1023     96 % Oxygen Therapy O2 Sat (%): 92 % (19 1295) Pulse via Oximetry: 60 beats per minute (03/22/19 2333) O2 Device: Hi flow nasal cannula (03/22/19 2333) O2 Flow Rate (L/min): 4 l/min (03/22/19 2333) No intake or output data in the 24 hours ending 03/23/19 0908 General:    Well nourished. Alert. CV:   RRR. No murmur, rub, or gallop. Lungs:   wheezing, rhonchi, or rales. Abdomen:   Soft, nontender, nondistended. Extremities: Warm and dry. No cyanosis or edema. Skin:     No rashes or jaundice. Data Review: 
I have reviewed all labs, meds, telemetry events, and studies from the last 24 hours. Recent Results (from the past 24 hour(s)) GLUCOSE, POC Collection Time: 03/22/19 10:58 AM  
Result Value Ref Range Glucose (POC) 369 (H) 65 - 100 mg/dL GLUCOSE, POC Collection Time: 03/22/19  3:48 PM  
Result Value Ref Range Glucose (POC) 387 (H) 65 - 100 mg/dL PLEASE READ & DOCUMENT PPD TEST IN 24 HRS Collection Time: 03/22/19  7:36 PM  
Result Value Ref Range PPD Negative Negative  
 mm Induration 0 0 - 5 mm GLUCOSE, POC Collection Time: 03/22/19  9:19 PM  
Result Value Ref Range Glucose (POC) 234 (H) 65 - 100 mg/dL GLUCOSE, POC Collection Time: 03/23/19  7:21 AM  
Result Value Ref Range Glucose (POC) 297 (H) 65 - 100 mg/dL All Micro Results Procedure Component Value Units Date/Time CULTURE, BLOOD [[id number]] Collected:  03/21/19 1532 Order Status:  Completed Specimen:  Whole Blood Updated:  03/23/19 5293 Special Requests: --     
  NO SPECIAL REQUESTS 
LEFT 
HAND Culture result: NO GROWTH 2 DAYS     
 CULTURE, BLOOD [[id number]] Collected:  03/21/19 1600 Order Status:  Completed Specimen:  Whole Blood Updated:  03/23/19 8435 Special Requests: LEFT HAND Culture result: NO GROWTH 2 DAYS Current Meds: 
Current Facility-Administered Medications Medication Dose Route Frequency  HYDROcodone-acetaminophen (NORCO)  mg tablet 1 Tab  1 Tab Oral Q6H PRN  
  insulin glargine (LANTUS) injection 90 Units  90 Units SubCUTAneous QHS  insulin lispro (HUMALOG) injection   SubCUTAneous AC&HS  
 albuterol-ipratropium (DUO-NEB) 2.5 MG-0.5 MG/3 ML  3 mL Nebulization Q4H RT  
 ALPRAZolam (XANAX) tablet 1 mg  1 mg Oral TID PRN  
 aspirin chewable tablet 81 mg  81 mg Oral DAILY  clopidogrel (PLAVIX) tablet 75 mg  75 mg Oral DAILY  escitalopram oxalate (LEXAPRO) tablet 20 mg  20 mg Oral DAILY  fenofibrate (LOFIBRA) tablet 160 mg  160 mg Oral DAILY  fluticasone-salmeterol (ADVAIR) 250mcg-50mcg/puff (Patient Supplied)  1 Puff Inhalation BID  
 guaiFENesin ER (MUCINEX) tablet 1,200 mg  1,200 mg Oral BID  levothyroxine (SYNTHROID) tablet 100 mcg  100 mcg Oral ACB  losartan (COZAAR) tablet 25 mg  25 mg Oral DAILY  metoprolol tartrate (LOPRESSOR) tablet 25 mg  25 mg Oral BID  niacin ER (NIASPAN) tablet 500 mg  500 mg Oral QHS  pantoprazole (PROTONIX) tablet 40 mg  40 mg Oral BID  rosuvastatin (CRESTOR) tablet 10 mg  10 mg Oral QHS  tiotropium (SPIRIVA) inhalation capsule 18 mcg  1 Cap Inhalation DAILY  sodium chloride (NS) flush 5-40 mL  5-40 mL IntraVENous Q8H  
 sodium chloride (NS) flush 5-40 mL  5-40 mL IntraVENous PRN  
 acetaminophen (TYLENOL) tablet 650 mg  650 mg Oral Q4H PRN  
 ondansetron (ZOFRAN) injection 4 mg  4 mg IntraVENous Q4H PRN  
 senna-docusate (PERICOLACE) 8.6-50 mg per tablet 1 Tab  1 Tab Oral BID PRN  
 nicotine (NICODERM CQ) 7 mg/24 hr patch 1 Patch  1 Patch TransDERmal Q24H  
 enoxaparin (LOVENOX) injection 40 mg  40 mg SubCUTAneous Q24H  
 levoFLOXacin (LEVAQUIN) 750 mg in D5W IVPB  750 mg IntraVENous Q24H  nystatin (MYCOSTATIN) 100,000 unit/gram powder   Topical BID  methylPREDNISolone (PF) (Solu-MEDROL) injection 40 mg  40 mg IntraVENous Q12H Other Studies (last 24 hours): No results found. Assessment and Plan:  
 
Hospital Problems as of 3/23/2019 Date Reviewed: 2/8/2019 Codes Class Noted - Resolved POA  
 COPD exacerbation ([facility] ICD-10-CM: J44.1 ICD-9-CM: 491.21  2/6/2019 - Present Unknown PLAN:   
· Copd exacerbation continue current medical mgt; f/up pulm eval 
· Acute on chronic resp failure- wean o2 as tolerated · Dm- uncontrolled on steroids- continue to titrate her insulin · Continue appropriate home meds · further workup and mgt based on her clinical course DC planning/Dispo:  Home - hopefully next 1-2 days DVT ppx:  lovenox Signed: 
Samantha Asif MD

## 2019-03-24 ENCOUNTER — APPOINTMENT (OUTPATIENT)
Dept: GENERAL RADIOLOGY | Age: 69
DRG: 191 | End: 2019-03-24
Attending: INTERNAL MEDICINE
Payer: MEDICARE

## 2019-03-24 LAB
BACTERIA SPEC CULT: ABNORMAL
BACTERIA SPEC CULT: ABNORMAL
GLUCOSE BLD STRIP.AUTO-MCNC: 154 MG/DL (ref 65–100)
GLUCOSE BLD STRIP.AUTO-MCNC: 283 MG/DL (ref 65–100)
GLUCOSE BLD STRIP.AUTO-MCNC: 289 MG/DL (ref 65–100)
GLUCOSE BLD STRIP.AUTO-MCNC: 346 MG/DL (ref 65–100)
GRAM STN SPEC: ABNORMAL
MM INDURATION POC: 0 MM (ref 0–5)
PPD POC: NEGATIVE NEGATIVE
SERVICE CMNT-IMP: ABNORMAL

## 2019-03-24 PROCEDURE — 82962 GLUCOSE BLOOD TEST: CPT

## 2019-03-24 PROCEDURE — 99232 SBSQ HOSP IP/OBS MODERATE 35: CPT | Performed by: INTERNAL MEDICINE

## 2019-03-24 PROCEDURE — 74011250636 HC RX REV CODE- 250/636: Performed by: NURSE PRACTITIONER

## 2019-03-24 PROCEDURE — 71046 X-RAY EXAM CHEST 2 VIEWS: CPT

## 2019-03-24 PROCEDURE — 94640 AIRWAY INHALATION TREATMENT: CPT

## 2019-03-24 PROCEDURE — 74011250637 HC RX REV CODE- 250/637: Performed by: FAMILY MEDICINE

## 2019-03-24 PROCEDURE — 74011636637 HC RX REV CODE- 636/637: Performed by: INTERNAL MEDICINE

## 2019-03-24 PROCEDURE — 94760 N-INVAS EAR/PLS OXIMETRY 1: CPT

## 2019-03-24 PROCEDURE — 74011250637 HC RX REV CODE- 250/637: Performed by: NURSE PRACTITIONER

## 2019-03-24 PROCEDURE — 74011250636 HC RX REV CODE- 250/636: Performed by: INTERNAL MEDICINE

## 2019-03-24 PROCEDURE — 77010033678 HC OXYGEN DAILY

## 2019-03-24 PROCEDURE — 74011000250 HC RX REV CODE- 250: Performed by: NURSE PRACTITIONER

## 2019-03-24 PROCEDURE — 65270000029 HC RM PRIVATE

## 2019-03-24 PROCEDURE — 74011250637 HC RX REV CODE- 250/637: Performed by: INTERNAL MEDICINE

## 2019-03-24 RX ADMIN — PANTOPRAZOLE SODIUM 40 MG: 40 TABLET, DELAYED RELEASE ORAL at 08:57

## 2019-03-24 RX ADMIN — INSULIN LISPRO 12 UNITS: 100 INJECTION, SOLUTION INTRAVENOUS; SUBCUTANEOUS at 16:32

## 2019-03-24 RX ADMIN — Medication 5 ML: at 14:00

## 2019-03-24 RX ADMIN — ALPRAZOLAM 1 MG: 0.5 TABLET ORAL at 22:31

## 2019-03-24 RX ADMIN — INSULIN LISPRO 15 UNITS: 100 INJECTION, SOLUTION INTRAVENOUS; SUBCUTANEOUS at 22:20

## 2019-03-24 RX ADMIN — GUAIFENESIN 1200 MG: 600 TABLET, EXTENDED RELEASE ORAL at 18:17

## 2019-03-24 RX ADMIN — INSULIN LISPRO 12 UNITS: 100 INJECTION, SOLUTION INTRAVENOUS; SUBCUTANEOUS at 12:34

## 2019-03-24 RX ADMIN — ALBUTEROL SULFATE 2.5 MG: 2.5 SOLUTION RESPIRATORY (INHALATION) at 15:58

## 2019-03-24 RX ADMIN — BUDESONIDE 500 MCG: 0.5 INHALANT RESPIRATORY (INHALATION) at 20:42

## 2019-03-24 RX ADMIN — ENOXAPARIN SODIUM 40 MG: 40 INJECTION SUBCUTANEOUS at 18:38

## 2019-03-24 RX ADMIN — METOPROLOL TARTRATE 25 MG: 25 TABLET ORAL at 08:58

## 2019-03-24 RX ADMIN — HYDROCODONE BITARTRATE AND ACETAMINOPHEN 1 TABLET: 10; 325 TABLET ORAL at 08:56

## 2019-03-24 RX ADMIN — NYSTATIN: 100000 POWDER TOPICAL at 09:00

## 2019-03-24 RX ADMIN — PANTOPRAZOLE SODIUM 40 MG: 40 TABLET, DELAYED RELEASE ORAL at 18:16

## 2019-03-24 RX ADMIN — ALPRAZOLAM 1 MG: 0.5 TABLET ORAL at 18:16

## 2019-03-24 RX ADMIN — METHYLPREDNISOLONE SODIUM SUCCINATE 40 MG: 125 INJECTION, POWDER, FOR SOLUTION INTRAMUSCULAR; INTRAVENOUS at 18:18

## 2019-03-24 RX ADMIN — ESCITALOPRAM OXALATE 20 MG: 10 TABLET ORAL at 08:57

## 2019-03-24 RX ADMIN — ROSUVASTATIN CALCIUM 10 MG: 5 TABLET, FILM COATED ORAL at 22:20

## 2019-03-24 RX ADMIN — INSULIN GLARGINE 98 UNITS: 100 INJECTION, SOLUTION SUBCUTANEOUS at 22:21

## 2019-03-24 RX ADMIN — Medication 5 ML: at 04:58

## 2019-03-24 RX ADMIN — METOPROLOL TARTRATE 25 MG: 25 TABLET ORAL at 18:17

## 2019-03-24 RX ADMIN — TIOTROPIUM BROMIDE 18 MCG: 18 CAPSULE ORAL; RESPIRATORY (INHALATION) at 08:04

## 2019-03-24 RX ADMIN — ALPRAZOLAM 1 MG: 0.5 TABLET ORAL at 08:56

## 2019-03-24 RX ADMIN — METHYLPREDNISOLONE SODIUM SUCCINATE 40 MG: 125 INJECTION, POWDER, FOR SOLUTION INTRAMUSCULAR; INTRAVENOUS at 12:40

## 2019-03-24 RX ADMIN — LOSARTAN POTASSIUM 25 MG: 25 TABLET ORAL at 08:56

## 2019-03-24 RX ADMIN — Medication 5 ML: at 22:20

## 2019-03-24 RX ADMIN — ALBUTEROL SULFATE 2.5 MG: 2.5 SOLUTION RESPIRATORY (INHALATION) at 08:03

## 2019-03-24 RX ADMIN — BUDESONIDE 500 MCG: 0.5 INHALANT RESPIRATORY (INHALATION) at 08:03

## 2019-03-24 RX ADMIN — NYSTATIN: 100000 POWDER TOPICAL at 18:00

## 2019-03-24 RX ADMIN — ALBUTEROL SULFATE 2.5 MG: 2.5 SOLUTION RESPIRATORY (INHALATION) at 20:42

## 2019-03-24 RX ADMIN — ALBUTEROL SULFATE 2.5 MG: 2.5 SOLUTION RESPIRATORY (INHALATION) at 11:16

## 2019-03-24 RX ADMIN — LEVOFLOXACIN 750 MG: 5 INJECTION, SOLUTION INTRAVENOUS at 16:00

## 2019-03-24 RX ADMIN — METHYLPREDNISOLONE SODIUM SUCCINATE 40 MG: 125 INJECTION, POWDER, FOR SOLUTION INTRAMUSCULAR; INTRAVENOUS at 04:44

## 2019-03-24 RX ADMIN — FENOFIBRATE 160 MG: 160 TABLET ORAL at 08:57

## 2019-03-24 RX ADMIN — GUAIFENESIN 1200 MG: 600 TABLET, EXTENDED RELEASE ORAL at 08:57

## 2019-03-24 RX ADMIN — LEVOTHYROXINE SODIUM 100 MCG: 100 TABLET ORAL at 04:45

## 2019-03-24 RX ADMIN — ASPIRIN 81 MG 81 MG: 81 TABLET ORAL at 08:58

## 2019-03-24 RX ADMIN — ALPRAZOLAM 1 MG: 0.5 TABLET ORAL at 12:35

## 2019-03-24 RX ADMIN — INSULIN LISPRO 2 UNITS: 100 INJECTION, SOLUTION INTRAVENOUS; SUBCUTANEOUS at 09:04

## 2019-03-24 RX ADMIN — CLOPIDOGREL BISULFATE 75 MG: 75 TABLET, FILM COATED ORAL at 08:57

## 2019-03-24 NOTE — PROGRESS NOTES
Hospitalist Progress Note Admit Date:  3/21/2019 12:33 PM  
Name:  Wolf Cotton Age:  70 y.o. 
:  1950 MRN:  [id number] PCP:  Chirag Pompa MD 
Treatment Team: Attending Provider: Ab Izquierdo DO; Utilization Review: Rosalina Hylton; Care Manager: Liane Riley RN; Consulting Provider: Merary Dacosta MD 
 
Subjective: As per HPI: 
 
\"Ms. Eliel Solomon is a 71 yo female with PMH of COPD on 2 L nocturnal O2, CAD, uterine cancer, DM II, HTN, JEREMÍAS does not use CPAP, smoker who presents with c/o generalized weakness and SOB worse than baseline X2 days. CXR showed mild pulmonary edema, BNP 28.  Pt recently DC in Feb for same. Intubated for COPD exacerbation in 2018 per pt. Is followed by Pulmonary in [place]. Was started on levaquin in ED. Pt denies CP, n/v/d, abd pain. \" 
 
2019- seen - sob wheezing - states she went home and took them medicines but she is still sick. . Feels she was discharged to soon- Wants to know if 2 cats at home could be triggering - pt still continues to smoke. . Does not think smoking is the issue- inhabitants of the house still smoke as well but smoke outside. .. 1 pack will last her 2-3 days on her own. 1 day with family members- she says she is going to try to quit 
 
2019- seen - sob; wheezing; no adverse overnight events reported 2019- seen - sob - wheezing - no adverse overnight events. .. Objective:  
 
Patient Vitals for the past 24 hrs: 
 Temp Pulse Resp BP SpO2  
19 1558     94 % 19 1136 98.1 °F (36.7 °C) 64 18 127/78 93 % 19 1116     94 % 19 0804     95 % 19 0726 98 °F (36.7 °C) (!) 54 18 123/70 92 % 19 0349 97.6 °F (36.4 °C) (!) 54 18 127/64 96 % 19 2329 98.2 °F (36.8 °C) (!) 59 18 131/68 93 % 19     94 % 19 1943 98.7 °F (37.1 °C) (!) 59 18 127/65 94 % 19 1711  65    Oxygen Therapy O2 Sat (%): 94 % (03/24/19 1558) Pulse via Oximetry: 55 beats per minute (03/24/19 1558) O2 Device: Hi flow nasal cannula (03/24/19 1558) O2 Flow Rate (L/min): 2 l/min (03/24/19 1558) No intake or output data in the 24 hours ending 03/24/19 1605 General:    Well nourished. Alert. CV:   RRR. No murmur, rub, or gallop. Lungs:   wheezing, rhonchi, or rales. Abdomen:   Soft, nontender, nondistended. Extremities: Warm and dry. No cyanosis or edema. Skin:     No rashes or jaundice. Data Review: 
I have reviewed all labs, meds, telemetry events, and studies from the last 24 hours. Recent Results (from the past 24 hour(s)) GLUCOSE, POC Collection Time: 03/23/19  4:46 PM  
Result Value Ref Range Glucose (POC) 384 (H) 65 - 100 mg/dL GLUCOSE, POC Collection Time: 03/23/19  5:57 PM  
Result Value Ref Range Glucose (POC) 367 (H) 65 - 100 mg/dL GLUCOSE, POC Collection Time: 03/23/19  8:55 PM  
Result Value Ref Range Glucose (POC) 326 (H) 65 - 100 mg/dL GLUCOSE, POC Collection Time: 03/24/19  7:32 AM  
Result Value Ref Range Glucose (POC) 154 (H) 65 - 100 mg/dL GLUCOSE, POC Collection Time: 03/24/19 11:37 AM  
Result Value Ref Range Glucose (POC) 289 (H) 65 - 100 mg/dL All Micro Results Procedure Component Value Units Date/Time CULTURE, RESPIRATORY/SPUTUM/BRONCH Tor Chin STAIN [[id number]] Order Status:  Sent Specimen:  Sputum CULTURE, BLOOD [[id number]]  (Abnormal) Collected:  03/21/19 1532 Order Status:  Completed Specimen:  Whole Blood Updated:  03/24/19 3968 Special Requests: --     
  LEFT 
HAND 
  
  GRAM STAIN    
  GRAM POS COCCI IN CLUSTERS  
     
   ANAEROBIC BOTTLE POSITIVE RESULTS VERIFIED, PHONED TO AND READ BACK BY Tessa Garcia ON 03/23/19 @1145, ADS Culture result:   STAPHYLOCOCCUS SPECIES, COAGULASE NEGATIVE  
     
      
  THIS ORGANISM MAY BE INDICATIVE OF CULTURE CONTAMINATION, HOWEVER, CLINICAL CORRELATION NEEDS TO BE EVALUATED, AS EACH CASE IS UNIQUE. CULTURE, BLOOD [[id number]] Collected:  03/23/19 1234 Order Status:  Completed Specimen:  Blood Updated:  03/24/19 9564 Special Requests: --     
  RIGHT 
HAND Culture result: NO GROWTH AFTER 16 HOURS     
 CULTURE, BLOOD [[id number]] Collected:  03/23/19 1245 Order Status:  Completed Specimen:  Blood Updated:  03/24/19 5156 Special Requests: --     
  LEFT Antecubital 
  
  Culture result: NO GROWTH AFTER 16 HOURS     
 CULTURE, BLOOD [[id number]] Collected:  03/21/19 1600 Order Status:  Completed Specimen:  Whole Blood Updated:  03/24/19 5540 Special Requests: LEFT HAND Culture result: NO GROWTH 3 DAYS Current Meds: 
Current Facility-Administered Medications Medication Dose Route Frequency  albuterol (PROVENTIL VENTOLIN) nebulizer solution 2.5 mg  2.5 mg Nebulization QID RT  
 budesonide (PULMICORT) 500 mcg/2 ml nebulizer suspension  500 mcg Nebulization BID RT  
 methylPREDNISolone (PF) (Solu-MEDROL) injection 40 mg  40 mg IntraVENous Q6H  
 insulin glargine (LANTUS) injection 98 Units  98 Units SubCUTAneous QHS  insulin lispro (HUMALOG) injection   SubCUTAneous AC&HS  ALPRAZolam (XANAX) tablet 1 mg  1 mg Oral QID PRN  
 nicotine (NICODERM CQ) 21 mg/24 hr patch 1 Patch  1 Patch TransDERmal DAILY  HYDROcodone-acetaminophen (NORCO)  mg tablet 1 Tab  1 Tab Oral Q6H PRN  
 aspirin chewable tablet 81 mg  81 mg Oral DAILY  clopidogrel (PLAVIX) tablet 75 mg  75 mg Oral DAILY  escitalopram oxalate (LEXAPRO) tablet 20 mg  20 mg Oral DAILY  fenofibrate (LOFIBRA) tablet 160 mg  160 mg Oral DAILY  fluticasone-salmeterol (ADVAIR) 250mcg-50mcg/puff (Patient Supplied)  1 Puff Inhalation BID  
 guaiFENesin ER (MUCINEX) tablet 1,200 mg  1,200 mg Oral BID  levothyroxine (SYNTHROID) tablet 100 mcg  100 mcg Oral ACB  losartan (COZAAR) tablet 25 mg  25 mg Oral DAILY  metoprolol tartrate (LOPRESSOR) tablet 25 mg  25 mg Oral BID  niacin ER (NIASPAN) tablet 500 mg  500 mg Oral QHS  pantoprazole (PROTONIX) tablet 40 mg  40 mg Oral BID  rosuvastatin (CRESTOR) tablet 10 mg  10 mg Oral QHS  tiotropium (SPIRIVA) inhalation capsule 18 mcg  1 Cap Inhalation DAILY  sodium chloride (NS) flush 5-40 mL  5-40 mL IntraVENous Q8H  
 sodium chloride (NS) flush 5-40 mL  5-40 mL IntraVENous PRN  
 acetaminophen (TYLENOL) tablet 650 mg  650 mg Oral Q4H PRN  
 ondansetron (ZOFRAN) injection 4 mg  4 mg IntraVENous Q4H PRN  
 senna-docusate (PERICOLACE) 8.6-50 mg per tablet 1 Tab  1 Tab Oral BID PRN  
 enoxaparin (LOVENOX) injection 40 mg  40 mg SubCUTAneous Q24H  
 levoFLOXacin (LEVAQUIN) 750 mg in D5W IVPB  750 mg IntraVENous Q24H  nystatin (MYCOSTATIN) 100,000 unit/gram powder   Topical BID Other Studies (last 24 hours): Xr Chest Pa Lat Result Date: 3/24/2019 CHEST X-RAY, 2 views. HISTORY:  Lung infiltrates, follow-up. TECHNIQUE: PA and lateral views. COMPARISON: 21 March 2019. FINDINGS: The lungs are clear. The heart size is normal. The costophrenic angles are sharp. The pulmonary vasculature is unremarkable. Interstitial edema decreased. There are aortic arch calcifications. Included portion of the upper abdomen is unremarkable. IMPRESSION: Decreased interstitial edema. No acute abnormality on today's exam.  
 
 
Assessment and Plan:  
 
Hospital Problems as of 3/24/2019 Date Reviewed: 2/8/2019 Codes Class Noted - Resolved POA * (Principal) COPD exacerbation ([facility] ICD-10-CM: J44.1 ICD-9-CM: 491.21  2/6/2019 - Present Yes  
   
 JEREMÍAS (obstructive sleep apnea)- intolerant of CPAP (Chronic) ICD-10-CM: U83.17 
ICD-9-CM: 327.23  2/4/2019 - Present Yes Acute respiratory failure ([facility] ICD-10-CM: J96.00 
ICD-9-CM: 518.81  2/4/2019 - Present Yes Morbid obesity ([facility] (Chronic) ICD-10-CM: E66.01 
ICD-9-CM: 278.01  4/17/2018 - Present Yes DM (diabetes mellitus) w/o complication (Chronic) [id number]: E11.9 ICD-9-CM: 250.00  4/15/2018 - Present Yes Tobacco abuse (Chronic) ICD-10-CM: Z72.0 ICD-9-CM: 305.1  4/15/2018 - Present Yes Nocturnal hypoxemia (Chronic) ICD-10-CM: G47.34 
ICD-9-CM: 327.24  4/15/2018 - Present Yes PLAN:   
· Copd exacerbation continue current medical mgt;pulm input appreciated · Acute on chronic resp failure- wean o2 as tolerated · Dm- uncontrolled on steroids- continue to titrate her insulin- now on lantus 98 units · Continue appropriate home meds · Further workup and mgt based on her clinical course DC planning/Dispo:  Home - hopefully next 1-2 days DVT ppx:  lovenox Signed: 
Lisa Conner MD

## 2019-03-24 NOTE — PROGRESS NOTES
Vinod Londonderry Admission Date: 3/21/2019 Daily Progress Note: 3/24/2019 The patient's chart is reviewed and the patient is discussed with the staff. Patient is a 70 y.o.  female seen and evaluated at the request of Dr. Krishan Fam. Patient has a history of morbid obesity, anxiety/depression, DM, GERD, IBS,  Hypothyroidism, uterine CA, HTN, HL, CAD s/p PCI, JEREMÍAS intolerant of CPAP and maintained on O2 at 3lpm with sleep, COPD, chronic respiratory failure, and tobacco abuse (~50 pack year history). She was admitted in 2/4-2/8/2019 with Influenza A and required BIPAP during her admission. She was to be seen by our office in follow up but has not yet done so (apparently follows with Pulmonary in [place]) 
  
Patient states that she never fully recovered from her last hospitalization. She completed her abx/steroids but never regained her energy. She has several sick family members who live with her. She states that she has been increasingly sob for the last several days and agreed to come to the ER for further evaluation. EMS was called and she was brought in. She reports ongoing / worsening sob since discharge last month as well as generalized weakness, fatigue, and cough productive of yellow mucus. She continues to smoke 0.5 ppd. CXR with mild pulmonary edema, BNP 28. She was admitted by the Hospitalist service, started on nebs, steroids, and abx. We are consulted to assist with her care. AF, currently on o2 at 2 lpm with o2 sat 96%. WBC 13.6. Subjective:  
Still wheezing - not much change,  On 2 L O2, 1 bl culture +- coag neg staph- probable contam 
 
Current Facility-Administered Medications Medication Dose Route Frequency  albuterol (PROVENTIL VENTOLIN) nebulizer solution 2.5 mg  2.5 mg Nebulization QID RT  
 budesonide (PULMICORT) 500 mcg/2 ml nebulizer suspension  500 mcg Nebulization BID RT  
  methylPREDNISolone (PF) (Solu-MEDROL) injection 40 mg  40 mg IntraVENous Q6H  
 insulin glargine (LANTUS) injection 98 Units  98 Units SubCUTAneous QHS  insulin lispro (HUMALOG) injection   SubCUTAneous AC&HS  ALPRAZolam (XANAX) tablet 1 mg  1 mg Oral QID PRN  
 nicotine (NICODERM CQ) 21 mg/24 hr patch 1 Patch  1 Patch TransDERmal DAILY  HYDROcodone-acetaminophen (NORCO)  mg tablet 1 Tab  1 Tab Oral Q6H PRN  
 aspirin chewable tablet 81 mg  81 mg Oral DAILY  clopidogrel (PLAVIX) tablet 75 mg  75 mg Oral DAILY  escitalopram oxalate (LEXAPRO) tablet 20 mg  20 mg Oral DAILY  fenofibrate (LOFIBRA) tablet 160 mg  160 mg Oral DAILY  fluticasone-salmeterol (ADVAIR) 250mcg-50mcg/puff (Patient Supplied)  1 Puff Inhalation BID  
 guaiFENesin ER (MUCINEX) tablet 1,200 mg  1,200 mg Oral BID  levothyroxine (SYNTHROID) tablet 100 mcg  100 mcg Oral ACB  losartan (COZAAR) tablet 25 mg  25 mg Oral DAILY  metoprolol tartrate (LOPRESSOR) tablet 25 mg  25 mg Oral BID  niacin ER (NIASPAN) tablet 500 mg  500 mg Oral QHS  pantoprazole (PROTONIX) tablet 40 mg  40 mg Oral BID  rosuvastatin (CRESTOR) tablet 10 mg  10 mg Oral QHS  tiotropium (SPIRIVA) inhalation capsule 18 mcg  1 Cap Inhalation DAILY  sodium chloride (NS) flush 5-40 mL  5-40 mL IntraVENous Q8H  
 sodium chloride (NS) flush 5-40 mL  5-40 mL IntraVENous PRN  
 acetaminophen (TYLENOL) tablet 650 mg  650 mg Oral Q4H PRN  
 ondansetron (ZOFRAN) injection 4 mg  4 mg IntraVENous Q4H PRN  
 senna-docusate (PERICOLACE) 8.6-50 mg per tablet 1 Tab  1 Tab Oral BID PRN  
 enoxaparin (LOVENOX) injection 40 mg  40 mg SubCUTAneous Q24H  
 levoFLOXacin (LEVAQUIN) 750 mg in D5W IVPB  750 mg IntraVENous Q24H  nystatin (MYCOSTATIN) 100,000 unit/gram powder   Topical BID Review of Systems Constitutional: negative for fever, chills, sweats Cardiovascular: negative for chest pain, palpitations, syncope, edema Gastrointestinal:  negative for dysphagia, reflux, vomiting, diarrhea, abdominal pain, or melena Neurologic:  negative for focal weakness, numbness, headache Objective:  
 
Vitals:  
 03/24/19 4939 03/24/19 0423 03/24/19 7078 03/24/19 4459 BP: 127/64  123/70 Pulse: (!) 54  (!) 54 Resp: 18  18 Temp: 97.6 °F (36.4 °C)  98 °F (36.7 °C) SpO2: 96%  92% 95% Weight:  187 lb (84.8 kg) Height:      
 
Intake and Output:  
No intake/output data recorded. No intake/output data recorded. Physical Exam:  
Constitution:  the patient is well developed and in no acute distress EENMT:  Sclera clear, pupils equal, oral mucosa moist 
Respiratory:  Exp wheezes Cardiovascular:  RRR without M,G,R 
Gastrointestinal: soft and non-tender; with positive bowel sounds. Musculoskeletal: warm without cyanosis. There is no lower leg edema. Skin:  no jaundice or rashes, no wounds Neurologic: no gross neuro deficits Psychiatric:  alert and oriented x 3 CXR:  
 
 
LAB Recent Labs  
  03/24/19 
0732 03/23/19 
2055 03/23/19 
1757 03/23/19 
1646 03/23/19 
1129 GLUCPOC 154* 326* 367* 384* 321* Recent Labs  
  03/22/19 
0400 03/21/19 
1242 WBC 13.6* 14.1* HGB 12.6 12.7 HCT 41.1 39.5  203 Recent Labs  
  03/22/19 
0400 03/21/19 
1307  137  
K 4.1 4.4  100 CO2 29 30 * 287* BUN 20 12 CREA 0.88 0.98  
CA 8.8 9.0 ALB 2.9* 3.1* TBILI 0.4 0.7 ALT 15 10* SGOT 13* 24  
 
Recent Labs  
  03/21/19 
1307 PHI 7.360 PCO2I 54.1*  
PO2I 102* HCO3I 30.6* No results for input(s): LCAD, LAC in the last 72 hours. Assessment:  (Medical Decision Making) Hospital Problems  Date Reviewed: 2/8/2019 Codes Class Noted POA * (Principal) COPD exacerbation ([facility] ICD-10-CM: J44.1 ICD-9-CM: 491.21  2/6/2019 Yes Still wheezing JEREMÍAS (obstructive sleep apnea)- intolerant of CPAP (Chronic) ICD-10-CM: G47.33 
ICD-9-CM: 327.23  2/4/2019 Yes Acute respiratory failure ([facility] ICD-10-CM: J96.00 
ICD-9-CM: 518.81  2/4/2019 Yes On O2 nc Morbid obesity ([facility] (Chronic) ICD-10-CM: E66.01 
ICD-9-CM: 278.01  4/17/2018 Yes DM (diabetes mellitus) w/o complication (Chronic) R-01-HU: E11.9 ICD-9-CM: 250.00  4/15/2018 Yes Tobacco abuse (Chronic) ICD-10-CM: Z72.0 ICD-9-CM: 305.1  4/15/2018 Yes Nocturnal hypoxemia (Chronic) ICD-10-CM: G47.34 
ICD-9-CM: 327.24  4/15/2018 Yes Plan:  (Medical Decision Making) 1   cxr today 2  Sputum culture today 3   Iv steroids, bds, 
-- 
 
More than 50% of the time documented was spent in face-to-face contact with the patient and in the care of the patient on the floor/unit where the patient is located.  
 
Anthony Kyle MD

## 2019-03-25 LAB
ANION GAP SERPL CALC-SCNC: 6 MMOL/L (ref 7–16)
BASOPHILS # BLD: 0 K/UL (ref 0–0.2)
BASOPHILS NFR BLD: 0 % (ref 0–2)
BUN SERPL-MCNC: 23 MG/DL (ref 8–23)
CALCIUM SERPL-MCNC: 8.7 MG/DL (ref 8.3–10.4)
CHLORIDE SERPL-SCNC: 99 MMOL/L (ref 98–107)
CO2 SERPL-SCNC: 32 MMOL/L (ref 21–32)
CREAT SERPL-MCNC: 0.86 MG/DL (ref 0.6–1)
DIFFERENTIAL METHOD BLD: ABNORMAL
EOSINOPHIL # BLD: 0 K/UL (ref 0–0.8)
EOSINOPHIL NFR BLD: 0 % (ref 0.5–7.8)
ERYTHROCYTE [DISTWIDTH] IN BLOOD BY AUTOMATED COUNT: 12.6 % (ref 11.9–14.6)
GLUCOSE BLD STRIP.AUTO-MCNC: 213 MG/DL (ref 65–100)
GLUCOSE BLD STRIP.AUTO-MCNC: 215 MG/DL (ref 65–100)
GLUCOSE BLD STRIP.AUTO-MCNC: 249 MG/DL (ref 65–100)
GLUCOSE BLD STRIP.AUTO-MCNC: 279 MG/DL (ref 65–100)
GLUCOSE SERPL-MCNC: 258 MG/DL (ref 65–100)
HCT VFR BLD AUTO: 38 % (ref 35.8–46.3)
HGB BLD-MCNC: 12 G/DL (ref 11.7–15.4)
IMM GRANULOCYTES # BLD AUTO: 0.2 K/UL (ref 0–0.5)
IMM GRANULOCYTES NFR BLD AUTO: 2 % (ref 0–5)
LYMPHOCYTES # BLD: 1.1 K/UL (ref 0.5–4.6)
LYMPHOCYTES NFR BLD: 12 % (ref 13–44)
MAGNESIUM SERPL-MCNC: 1.9 MG/DL (ref 1.8–2.4)
MCH RBC QN AUTO: 28.5 PG (ref 26.1–32.9)
MCHC RBC AUTO-ENTMCNC: 31.6 G/DL (ref 31.4–35)
MCV RBC AUTO: 90.3 FL (ref 79.6–97.8)
MONOCYTES # BLD: 0.6 K/UL (ref 0.1–1.3)
MONOCYTES NFR BLD: 7 % (ref 4–12)
NEUTS SEG # BLD: 7.2 K/UL (ref 1.7–8.2)
NEUTS SEG NFR BLD: 79 % (ref 43–78)
NRBC # BLD: 0 K/UL (ref 0–0.2)
PLATELET # BLD AUTO: 213 K/UL (ref 150–450)
PMV BLD AUTO: 11.5 FL (ref 9.4–12.3)
POTASSIUM SERPL-SCNC: 3.8 MMOL/L (ref 3.5–5.1)
RBC # BLD AUTO: 4.21 M/UL (ref 4.05–5.2)
SODIUM SERPL-SCNC: 137 MMOL/L (ref 136–145)
WBC # BLD AUTO: 9.2 K/UL (ref 4.3–11.1)

## 2019-03-25 PROCEDURE — 80048 BASIC METABOLIC PNL TOTAL CA: CPT

## 2019-03-25 PROCEDURE — 85025 COMPLETE CBC W/AUTO DIFF WBC: CPT

## 2019-03-25 PROCEDURE — 83735 ASSAY OF MAGNESIUM: CPT

## 2019-03-25 PROCEDURE — 74011250637 HC RX REV CODE- 250/637: Performed by: INTERNAL MEDICINE

## 2019-03-25 PROCEDURE — 94640 AIRWAY INHALATION TREATMENT: CPT

## 2019-03-25 PROCEDURE — 74011250636 HC RX REV CODE- 250/636: Performed by: NURSE PRACTITIONER

## 2019-03-25 PROCEDURE — 74011636637 HC RX REV CODE- 636/637: Performed by: INTERNAL MEDICINE

## 2019-03-25 PROCEDURE — 94760 N-INVAS EAR/PLS OXIMETRY 1: CPT

## 2019-03-25 PROCEDURE — 74011250637 HC RX REV CODE- 250/637: Performed by: NURSE PRACTITIONER

## 2019-03-25 PROCEDURE — 99232 SBSQ HOSP IP/OBS MODERATE 35: CPT | Performed by: INTERNAL MEDICINE

## 2019-03-25 PROCEDURE — 74011250636 HC RX REV CODE- 250/636: Performed by: INTERNAL MEDICINE

## 2019-03-25 PROCEDURE — 74011250637 HC RX REV CODE- 250/637: Performed by: FAMILY MEDICINE

## 2019-03-25 PROCEDURE — 82962 GLUCOSE BLOOD TEST: CPT

## 2019-03-25 PROCEDURE — 65270000029 HC RM PRIVATE

## 2019-03-25 PROCEDURE — 36415 COLL VENOUS BLD VENIPUNCTURE: CPT

## 2019-03-25 PROCEDURE — 94761 N-INVAS EAR/PLS OXIMETRY MLT: CPT

## 2019-03-25 PROCEDURE — 77010033678 HC OXYGEN DAILY

## 2019-03-25 PROCEDURE — 74011000250 HC RX REV CODE- 250: Performed by: NURSE PRACTITIONER

## 2019-03-25 RX ORDER — PREDNISONE 10 MG/1
40 TABLET ORAL
Status: DISCONTINUED | OUTPATIENT
Start: 2019-03-26 | End: 2019-03-26 | Stop reason: HOSPADM

## 2019-03-25 RX ADMIN — ALPRAZOLAM 1 MG: 0.5 TABLET ORAL at 15:18

## 2019-03-25 RX ADMIN — NYSTATIN: 100000 POWDER TOPICAL at 18:17

## 2019-03-25 RX ADMIN — INSULIN LISPRO 9 UNITS: 100 INJECTION, SOLUTION INTRAVENOUS; SUBCUTANEOUS at 08:46

## 2019-03-25 RX ADMIN — BUDESONIDE 500 MCG: 0.5 INHALANT RESPIRATORY (INHALATION) at 09:07

## 2019-03-25 RX ADMIN — PANTOPRAZOLE SODIUM 40 MG: 40 TABLET, DELAYED RELEASE ORAL at 08:47

## 2019-03-25 RX ADMIN — NYSTATIN: 100000 POWDER TOPICAL at 08:59

## 2019-03-25 RX ADMIN — Medication 5 ML: at 15:08

## 2019-03-25 RX ADMIN — INSULIN GLARGINE 98 UNITS: 100 INJECTION, SOLUTION SUBCUTANEOUS at 22:11

## 2019-03-25 RX ADMIN — PANTOPRAZOLE SODIUM 40 MG: 40 TABLET, DELAYED RELEASE ORAL at 18:16

## 2019-03-25 RX ADMIN — ALBUTEROL SULFATE 2.5 MG: 2.5 SOLUTION RESPIRATORY (INHALATION) at 09:07

## 2019-03-25 RX ADMIN — METOPROLOL TARTRATE 25 MG: 25 TABLET ORAL at 08:47

## 2019-03-25 RX ADMIN — BUDESONIDE 500 MCG: 0.5 INHALANT RESPIRATORY (INHALATION) at 21:07

## 2019-03-25 RX ADMIN — Medication 5 ML: at 06:33

## 2019-03-25 RX ADMIN — LOSARTAN POTASSIUM 25 MG: 25 TABLET ORAL at 08:49

## 2019-03-25 RX ADMIN — GUAIFENESIN 1200 MG: 600 TABLET, EXTENDED RELEASE ORAL at 18:16

## 2019-03-25 RX ADMIN — METHYLPREDNISOLONE SODIUM SUCCINATE 40 MG: 125 INJECTION, POWDER, FOR SOLUTION INTRAMUSCULAR; INTRAVENOUS at 06:28

## 2019-03-25 RX ADMIN — ROSUVASTATIN CALCIUM 10 MG: 5 TABLET, FILM COATED ORAL at 22:10

## 2019-03-25 RX ADMIN — METHYLPREDNISOLONE SODIUM SUCCINATE 40 MG: 125 INJECTION, POWDER, FOR SOLUTION INTRAMUSCULAR; INTRAVENOUS at 00:04

## 2019-03-25 RX ADMIN — TIOTROPIUM BROMIDE 18 MCG: 18 CAPSULE ORAL; RESPIRATORY (INHALATION) at 09:08

## 2019-03-25 RX ADMIN — ASPIRIN 81 MG 81 MG: 81 TABLET ORAL at 08:49

## 2019-03-25 RX ADMIN — LEVOTHYROXINE SODIUM 100 MCG: 100 TABLET ORAL at 06:28

## 2019-03-25 RX ADMIN — HYDROCODONE BITARTRATE AND ACETAMINOPHEN 1 TABLET: 10; 325 TABLET ORAL at 12:37

## 2019-03-25 RX ADMIN — Medication 10 ML: at 22:00

## 2019-03-25 RX ADMIN — ALPRAZOLAM 1 MG: 0.5 TABLET ORAL at 08:57

## 2019-03-25 RX ADMIN — ALBUTEROL SULFATE 2.5 MG: 2.5 SOLUTION RESPIRATORY (INHALATION) at 21:07

## 2019-03-25 RX ADMIN — LEVOTHYROXINE SODIUM 100 MCG: 100 TABLET ORAL at 10:22

## 2019-03-25 RX ADMIN — INSULIN LISPRO 9 UNITS: 100 INJECTION, SOLUTION INTRAVENOUS; SUBCUTANEOUS at 22:11

## 2019-03-25 RX ADMIN — METOPROLOL TARTRATE 25 MG: 25 TABLET ORAL at 18:16

## 2019-03-25 RX ADMIN — ALPRAZOLAM 1 MG: 0.5 TABLET ORAL at 22:10

## 2019-03-25 RX ADMIN — GUAIFENESIN 1200 MG: 600 TABLET, EXTENDED RELEASE ORAL at 08:49

## 2019-03-25 RX ADMIN — FENOFIBRATE 160 MG: 160 TABLET ORAL at 08:49

## 2019-03-25 RX ADMIN — ESCITALOPRAM OXALATE 20 MG: 10 TABLET ORAL at 08:47

## 2019-03-25 RX ADMIN — CLOPIDOGREL BISULFATE 75 MG: 75 TABLET, FILM COATED ORAL at 08:47

## 2019-03-25 RX ADMIN — ALBUTEROL SULFATE 2.5 MG: 2.5 SOLUTION RESPIRATORY (INHALATION) at 11:55

## 2019-03-25 RX ADMIN — ENOXAPARIN SODIUM 40 MG: 40 INJECTION SUBCUTANEOUS at 20:04

## 2019-03-25 RX ADMIN — LEVOFLOXACIN 750 MG: 500 TABLET, FILM COATED ORAL at 15:08

## 2019-03-25 RX ADMIN — METHYLPREDNISOLONE SODIUM SUCCINATE 40 MG: 40 INJECTION, POWDER, FOR SOLUTION INTRAMUSCULAR; INTRAVENOUS at 22:00

## 2019-03-25 RX ADMIN — ALBUTEROL SULFATE 2.5 MG: 2.5 SOLUTION RESPIRATORY (INHALATION) at 15:47

## 2019-03-25 RX ADMIN — INSULIN LISPRO 9 UNITS: 100 INJECTION, SOLUTION INTRAVENOUS; SUBCUTANEOUS at 11:42

## 2019-03-25 RX ADMIN — INSULIN LISPRO 12 UNITS: 100 INJECTION, SOLUTION INTRAVENOUS; SUBCUTANEOUS at 17:46

## 2019-03-25 NOTE — PROGRESS NOTES
Wyatt Nuñez Admission Date: 3/21/2019 Daily Progress Note: 3/25/2019 Patient is R 79 y.o.  female seen and evaluated at the request of Dr. Boris Iyer has a history of morbid obesity, anxiety/depression, DM, GERD, IBS,  Hypothyroidism, uterine CA, HTN, HL, CAD s/p PCI, JEREMÍAS intolerant of CPAP and maintained on O2 at 3lpm with sleep, COPD, chronic respiratory failure, and tobacco abuse (~50 pack year history). She was admitted in 2/4-2/8/2019 with Influenza A and required BIPAP during her admission. She was to be seen by our office in follow up but has not yet done so (apparently follows with Pulmonary in [place]) 
  
Patient states that she never fully recovered from her last hospitalization.  She completed her abx/steroids but never regained her energy. Jory Farnsworth continues to smoke 0.5 ppd.  CXR with mild pulmonary edema, BNP 28.  She was admitted by the Hospitalist service, started on nebs, steroids, and abx.  We are consulted to assist with her care. AF, currently on o2 at 2 lpm with o2 sat 96%.  WBC 13.6. Subjective:  
 
Repeated admissions with AECOPD with continued smoking (since she was 14 yo) Review of Systems Respiratory: positive for cough, wheezing or dyspnea on exertion Current Facility-Administered Medications Medication Dose Route Frequency  albuterol (PROVENTIL VENTOLIN) nebulizer solution 2.5 mg  2.5 mg Nebulization QID RT  
 budesonide (PULMICORT) 500 mcg/2 ml nebulizer suspension  500 mcg Nebulization BID RT  
 methylPREDNISolone (PF) (Solu-MEDROL) injection 40 mg  40 mg IntraVENous Q6H  
 insulin glargine (LANTUS) injection 98 Units  98 Units SubCUTAneous QHS  insulin lispro (HUMALOG) injection   SubCUTAneous AC&HS  ALPRAZolam (XANAX) tablet 1 mg  1 mg Oral QID PRN  
 nicotine (NICODERM CQ) 21 mg/24 hr patch 1 Patch  1 Patch TransDERmal DAILY  HYDROcodone-acetaminophen (NORCO)  mg tablet 1 Tab  1 Tab Oral Q6H PRN  
  aspirin chewable tablet 81 mg  81 mg Oral DAILY  clopidogrel (PLAVIX) tablet 75 mg  75 mg Oral DAILY  escitalopram oxalate (LEXAPRO) tablet 20 mg  20 mg Oral DAILY  fenofibrate (LOFIBRA) tablet 160 mg  160 mg Oral DAILY  fluticasone-salmeterol (ADVAIR) 250mcg-50mcg/puff (Patient Supplied)  1 Puff Inhalation BID  
 guaiFENesin ER (MUCINEX) tablet 1,200 mg  1,200 mg Oral BID  levothyroxine (SYNTHROID) tablet 100 mcg  100 mcg Oral ACB  losartan (COZAAR) tablet 25 mg  25 mg Oral DAILY  metoprolol tartrate (LOPRESSOR) tablet 25 mg  25 mg Oral BID  niacin ER (NIASPAN) tablet 500 mg  500 mg Oral QHS  pantoprazole (PROTONIX) tablet 40 mg  40 mg Oral BID  rosuvastatin (CRESTOR) tablet 10 mg  10 mg Oral QHS  tiotropium (SPIRIVA) inhalation capsule 18 mcg  1 Cap Inhalation DAILY  sodium chloride (NS) flush 5-40 mL  5-40 mL IntraVENous Q8H  
 sodium chloride (NS) flush 5-40 mL  5-40 mL IntraVENous PRN  
 acetaminophen (TYLENOL) tablet 650 mg  650 mg Oral Q4H PRN  
 ondansetron (ZOFRAN) injection 4 mg  4 mg IntraVENous Q4H PRN  
 senna-docusate (PERICOLACE) 8.6-50 mg per tablet 1 Tab  1 Tab Oral BID PRN  
 enoxaparin (LOVENOX) injection 40 mg  40 mg SubCUTAneous Q24H  
 levoFLOXacin (LEVAQUIN) 750 mg in D5W IVPB  750 mg IntraVENous Q24H  nystatin (MYCOSTATIN) 100,000 unit/gram powder   Topical BID Objective:  
 
Vitals:  
 03/25/19 2314 03/25/19 9566 03/25/19 1112 03/25/19 2482 BP:  142/66 Pulse:  (!) 50 (!) 56 Resp:  16 Temp:  98.4 °F (36.9 °C) SpO2:  94%  96% Weight: 197 lb (89.4 kg) Height:      
 
Intake and Output:  
No intake/output data recorded. 03/25 0701 - 03/25 1900 In: 480 [P.O.:480] Out: - Physical Exam:         
Constitutional: the patient is obese HEENT: Sclera clear, pupils equal, oral mucosa moist 
Lungs: scattered diffuse wheezes on 3 lpm  
Cardiovascular: RRR without M,G,R 
 Abd/GI: soft and non-tender; with positive bowel sounds. Ext: warm without cyanosis. There is no lower leg edema. Musculoskeletal: moves all four extremities with equal strength Skin: no jaundice or rashes, no wounds Neuro: no gross neuro deficits Lines/Drains:  PIV Nutrition: ADA CHEST XRAY:  
 
 
 
 
 
Assessment:  
 
Patient Active Problem List  
Diagnosis Code  DM (diabetes mellitus) w/o complication L83.5  
 HTN (hypertension) - controlled, benign I10  
 COPD (chronic obstructive pulmonary disease) ([facility]) J44.9  Hypothyroidism E03.9  ASCAD - of the native vessel I25.110  
 Hypotension I95.9  Esophageal reflux K21.9  Stable angina ([facility]) I20.8  Chronic total occlusion of coronary artery(414.2) I25.82  Cystocele Cormier Mocha  Female stress incontinence N39.3  Syncope and collapse R55  Osteoarthritis M19.90  Dyslipidemia E78.5  Acute on chronic respiratory failure with hypoxia and hypercapnia ([facility]) J96.21, M96.73  
 Head lice infestation X75.6  Tobacco abuse Z72.0  
 Nocturnal hypoxemia G47.34  
 Fall W19. Glorya Simpers  Bradycardia R00.1  Morbid obesity ([facility]) E66.01  
 Hyponatremia E87.1  Influenza A (H5N1) J09. X2  
 JEREMÍAS (obstructive sleep apnea)- intolerant of CPAP G47.33  
 Acute respiratory failure ([facility]) J96.00  
 COPD exacerbation ([facility]) J44.1  Lice N54.6 COPD exacerbation ([facility] (2/6/2019) Solumedrol, nebs Tobacco abuse (4/15/2018) Smoking cessation discussed Nocturnal hypoxemia (4/15/2018) Morbid obesity ([address] (4/17/2018) JEREMÍAS (obstructive sleep apnea)- intolerant of CPAP (2/4/2019) On O2 Q HS Acute respiratory failure ([facility] (2/4/2019) Assess RA sat PLAN: 
Smoking cessation imperative. Nicoderm. Albuterol, pulmicort Taper steroids, may be able to convert to prednisone tomorrow if able Assess o2 needs MIRANDA Whitaker I have spoken with and examined the patient.  I agree with the above assessment and plan as documented. Discussed smoking cessation at length. Gen: pleasant on nasal cannula Lungs:  Bilateral wheezes Heart:  RRR with no Murmur/Rubs/Gallops Ext: trace edema 
 
--oral steroids tomorrow --anticipate discharge in 24-48h with [facility] f/u 
--discussed smoking cessation and she is considering. Todd Whitehead MD 
 
 
More than 50% of time documented was spent in face-to-face contact with the patient and in the care of the patient on the floor/unit where the patient is located.

## 2019-03-25 NOTE — PROGRESS NOTES
Problem: Falls - Risk of 
Goal: *Absence of Falls Description Document Efrain Franks Fall Risk and appropriate interventions in the flowsheet. Outcome: Progressing Towards Goal 
  
Problem: Patient Education: Go to Patient Education Activity Goal: Patient/Family Education Outcome: Progressing Towards Goal 
  
Problem: Chronic Obstructive Pulmonary Disease (COPD) Goal: *Oxygen saturation during activity within specified parameters Outcome: Progressing Towards Goal 
Goal: *Able to remain out of bed as prescribed Outcome: Progressing Towards Goal 
Goal: *Absence of hypoxia Outcome: Progressing Towards Goal 
Goal: *Optimize nutritional status Outcome: Progressing Towards Goal

## 2019-03-25 NOTE — PROGRESS NOTES
Problem: Falls - Risk of 
Goal: *Absence of Falls Description Document Sherine Ortizjared Fall Risk and appropriate interventions in the flowsheet. Outcome: Progressing Towards Goal 
Note:  
Fall Risk Interventions: 
Mobility Interventions: Bed/chair exit alarm Mentation Interventions: Bed/chair exit alarm, Door open when patient unattended Medication Interventions: Bed/chair exit alarm, Patient to call before getting OOB, Teach patient to arise slowly Elimination Interventions: Bed/chair exit alarm, Call light in reach, Patient to call for help with toileting needs History of Falls Interventions: Bed/chair exit alarm, Door open when patient unattended Problem: Patient Education: Go to Patient Education Activity Goal: Patient/Family Education Outcome: Progressing Towards Goal 
  
Problem: Chronic Obstructive Pulmonary Disease (COPD) Goal: *Oxygen saturation during activity within specified parameters Outcome: Progressing Towards Goal 
Goal: *Able to remain out of bed as prescribed Outcome: Progressing Towards Goal 
Goal: *Absence of hypoxia Outcome: Progressing Towards Goal 
Goal: *Optimize nutritional status Outcome: Progressing Towards Goal 
  
Problem: Patient Education: Go to Patient Education Activity Goal: Patient/Family Education Outcome: Progressing Towards Goal

## 2019-03-25 NOTE — INTERDISCIPLINARY ROUNDS
Interdisciplinary team rounds were held 3/25/2019 with the following team members:Care Management, Nursing, Physical Therapy and Physician and the patient. Plan of care discussed. See clinical pathway and/or care plan for interventions and desired outcomes. Pt to discharge to home tomorrow.

## 2019-03-25 NOTE — PROGRESS NOTES
Pt on Room Air at rest SAT-85%. Pt placed on 1 L at rest SAT-88%. Pt placed on 2L at rest SAT-90%. Pt on 2L and Ambulated 6+ Min SAT-90%. Pt back to her bed on 2L seated SAT-90%

## 2019-03-26 ENCOUNTER — PATIENT OUTREACH (OUTPATIENT)
Dept: CASE MANAGEMENT | Age: 69
End: 2019-03-26

## 2019-03-26 VITALS
HEART RATE: 55 BPM | DIASTOLIC BLOOD PRESSURE: 72 MMHG | WEIGHT: 196.6 LBS | TEMPERATURE: 97.5 F | BODY MASS INDEX: 33.57 KG/M2 | RESPIRATION RATE: 20 BRPM | OXYGEN SATURATION: 94 % | HEIGHT: 64 IN | SYSTOLIC BLOOD PRESSURE: 163 MMHG

## 2019-03-26 LAB
ANION GAP SERPL CALC-SCNC: 5 MMOL/L (ref 7–16)
BACTERIA SPEC CULT: NORMAL
BASOPHILS # BLD: 0 K/UL (ref 0–0.2)
BASOPHILS NFR BLD: 0 % (ref 0–2)
BUN SERPL-MCNC: 21 MG/DL (ref 8–23)
CALCIUM SERPL-MCNC: 8.8 MG/DL (ref 8.3–10.4)
CHLORIDE SERPL-SCNC: 103 MMOL/L (ref 98–107)
CO2 SERPL-SCNC: 33 MMOL/L (ref 21–32)
CREAT SERPL-MCNC: 0.81 MG/DL (ref 0.6–1)
DIFFERENTIAL METHOD BLD: ABNORMAL
EOSINOPHIL # BLD: 0 K/UL (ref 0–0.8)
EOSINOPHIL NFR BLD: 0 % (ref 0.5–7.8)
ERYTHROCYTE [DISTWIDTH] IN BLOOD BY AUTOMATED COUNT: 12.5 % (ref 11.9–14.6)
GLUCOSE BLD STRIP.AUTO-MCNC: 93 MG/DL (ref 65–100)
GLUCOSE BLD STRIP.AUTO-MCNC: 98 MG/DL (ref 65–100)
GLUCOSE SERPL-MCNC: 95 MG/DL (ref 65–100)
HCT VFR BLD AUTO: 39.1 % (ref 35.8–46.3)
HGB BLD-MCNC: 12.5 G/DL (ref 11.7–15.4)
IMM GRANULOCYTES # BLD AUTO: 0.2 K/UL (ref 0–0.5)
IMM GRANULOCYTES NFR BLD AUTO: 2 % (ref 0–5)
LYMPHOCYTES # BLD: 2.1 K/UL (ref 0.5–4.6)
LYMPHOCYTES NFR BLD: 19 % (ref 13–44)
MCH RBC QN AUTO: 28.9 PG (ref 26.1–32.9)
MCHC RBC AUTO-ENTMCNC: 32 G/DL (ref 31.4–35)
MCV RBC AUTO: 90.3 FL (ref 79.6–97.8)
MONOCYTES # BLD: 0.7 K/UL (ref 0.1–1.3)
MONOCYTES NFR BLD: 6 % (ref 4–12)
NEUTS SEG # BLD: 8.2 K/UL (ref 1.7–8.2)
NEUTS SEG NFR BLD: 73 % (ref 43–78)
NRBC # BLD: 0 K/UL (ref 0–0.2)
PLATELET # BLD AUTO: 252 K/UL (ref 150–450)
PMV BLD AUTO: 11 FL (ref 9.4–12.3)
POTASSIUM SERPL-SCNC: 4 MMOL/L (ref 3.5–5.1)
RBC # BLD AUTO: 4.33 M/UL (ref 4.05–5.2)
SERVICE CMNT-IMP: NORMAL
SODIUM SERPL-SCNC: 141 MMOL/L (ref 136–145)
WBC # BLD AUTO: 11.2 K/UL (ref 4.3–11.1)

## 2019-03-26 PROCEDURE — 99232 SBSQ HOSP IP/OBS MODERATE 35: CPT | Performed by: INTERNAL MEDICINE

## 2019-03-26 PROCEDURE — 74011636637 HC RX REV CODE- 636/637: Performed by: INTERNAL MEDICINE

## 2019-03-26 PROCEDURE — 82962 GLUCOSE BLOOD TEST: CPT

## 2019-03-26 PROCEDURE — 94760 N-INVAS EAR/PLS OXIMETRY 1: CPT

## 2019-03-26 PROCEDURE — 77010033678 HC OXYGEN DAILY

## 2019-03-26 PROCEDURE — 94640 AIRWAY INHALATION TREATMENT: CPT

## 2019-03-26 PROCEDURE — 74011250637 HC RX REV CODE- 250/637: Performed by: NURSE PRACTITIONER

## 2019-03-26 PROCEDURE — 36415 COLL VENOUS BLD VENIPUNCTURE: CPT

## 2019-03-26 PROCEDURE — 80048 BASIC METABOLIC PNL TOTAL CA: CPT

## 2019-03-26 PROCEDURE — 74011000250 HC RX REV CODE- 250: Performed by: NURSE PRACTITIONER

## 2019-03-26 PROCEDURE — 85025 COMPLETE CBC W/AUTO DIFF WBC: CPT

## 2019-03-26 PROCEDURE — 74011250637 HC RX REV CODE- 250/637: Performed by: FAMILY MEDICINE

## 2019-03-26 RX ORDER — NIACIN 500 MG/1
TABLET, EXTENDED RELEASE ORAL
Qty: 30 TAB | Refills: 5 | Status: SHIPPED | OUTPATIENT
Start: 2019-03-26

## 2019-03-26 RX ORDER — PREDNISONE 20 MG/1
TABLET ORAL
Qty: 12 TAB | Refills: 0 | Status: SHIPPED | OUTPATIENT
Start: 2019-03-26 | End: 2019-09-17

## 2019-03-26 RX ADMIN — ALBUTEROL SULFATE 2.5 MG: 2.5 SOLUTION RESPIRATORY (INHALATION) at 11:29

## 2019-03-26 RX ADMIN — PREDNISONE 40 MG: 10 TABLET ORAL at 09:03

## 2019-03-26 RX ADMIN — ASPIRIN 81 MG 81 MG: 81 TABLET ORAL at 09:03

## 2019-03-26 RX ADMIN — BUDESONIDE 500 MCG: 0.5 INHALANT RESPIRATORY (INHALATION) at 08:03

## 2019-03-26 RX ADMIN — LOSARTAN POTASSIUM 25 MG: 25 TABLET ORAL at 09:03

## 2019-03-26 RX ADMIN — CLOPIDOGREL BISULFATE 75 MG: 75 TABLET, FILM COATED ORAL at 09:03

## 2019-03-26 RX ADMIN — LEVOTHYROXINE SODIUM 100 MCG: 100 TABLET ORAL at 05:57

## 2019-03-26 RX ADMIN — Medication 10 ML: at 05:59

## 2019-03-26 RX ADMIN — PANTOPRAZOLE SODIUM 40 MG: 40 TABLET, DELAYED RELEASE ORAL at 09:03

## 2019-03-26 RX ADMIN — METOPROLOL TARTRATE 25 MG: 25 TABLET ORAL at 09:03

## 2019-03-26 RX ADMIN — ALBUTEROL SULFATE 2.5 MG: 2.5 SOLUTION RESPIRATORY (INHALATION) at 08:03

## 2019-03-26 RX ADMIN — NYSTATIN: 100000 POWDER TOPICAL at 09:03

## 2019-03-26 RX ADMIN — GUAIFENESIN 1200 MG: 600 TABLET, EXTENDED RELEASE ORAL at 09:03

## 2019-03-26 RX ADMIN — ESCITALOPRAM OXALATE 20 MG: 10 TABLET ORAL at 09:03

## 2019-03-26 RX ADMIN — TIOTROPIUM BROMIDE 18 MCG: 18 CAPSULE ORAL; RESPIRATORY (INHALATION) at 08:03

## 2019-03-26 RX ADMIN — FENOFIBRATE 160 MG: 160 TABLET ORAL at 09:03

## 2019-03-26 NOTE — PROGRESS NOTES
Problem: Falls - Risk of 
Goal: *Absence of Falls Description Document Efrain Franks Fall Risk and appropriate interventions in the flowsheet. Outcome: Progressing Towards Goal 
  
Problem: Patient Education: Go to Patient Education Activity Goal: Patient/Family Education Outcome: Progressing Towards Goal 
  
Problem: Chronic Obstructive Pulmonary Disease (COPD) Goal: *Oxygen saturation during activity within specified parameters Outcome: Progressing Towards Goal 
Goal: *Able to remain out of bed as prescribed Outcome: Progressing Towards Goal 
Goal: *Absence of hypoxia Outcome: Progressing Towards Goal 
Goal: *Optimize nutritional status Outcome: Progressing Towards Goal 
  
Problem: Patient Education: Go to Patient Education Activity Goal: Patient/Family Education Outcome: Progressing Towards Goal

## 2019-03-26 NOTE — DISCHARGE SUMMARY
Hospitalist Discharge Summary     Patient ID:  Ross Mcelroy  [id number]  75 y.o.  1950  Admit date: 3/21/2019 12:33 PM  Discharge date and time: 3/26/2019  Attending: Chad Mazariegos DO  PCP:  Jada Farah MD  Treatment Team: Attending Provider: Chad Mazariegos DO; Care Manager: Deni Gross, RN; Consulting Provider: Sonali Gerber MD; Care Manager: Nicole Hightower RN; Utilization Review: Toyin Yanes RN; Physical Therapist: Sonido Javier DPT    Principal Diagnosis   Acute COPD exacerbation ([facility] [address]   Acute on chronic hypoxic respiratory failure  Tobacco abuse  DM-2      Principal Problem:    COPD exacerbation ([facility] [address] (2/6/2019)    Active Problems:    DM (diabetes mellitus) w/o complication (8/60/4565)      Tobacco abuse (4/15/2018)      Nocturnal hypoxemia (4/15/2018)      Morbid obesity ([facility] [address] (4/17/2018)      JEREMÍAS (obstructive sleep apnea)- intolerant of CPAP (2/4/2019)      Acute respiratory failure ([facility] [address] (2/4/2019)             Hospital Course:  Please refer to the admission H&P for details of presentation. In summary, the patient is 71 yo female with PMH of COPD on 2 L nocturnal O2, CAD, uterine cancer, DM II, HTN, JEREMÍAS does not use CPAP, smoker admitted on 3/21 for acute COPD exacerbation and acute on chronic hypoxic respiratory failure.  CXR showed mild pulmonary edema, BNP 28.  Pt recently DC in Feb for same.  Intubated for COPD exacerbation in August 2018 per pt.  Is followed by Pulmonary in [place].  Was started on levaquin in ED.    Pt was started on IV steroids, pulmicort and bronchodilator nebs. Pt was also counseled for tobacco cessation. She gradually showed improvement in symptoms. Pulmonary was on board, assisted in managing the patient. She was given levaquin for 3-4 days. She is medically stable and at baseline, she will be discharged to home today. For further details, please refer to daily progress notes.   Rest of the hospital course was uneventful. Significant Diagnostic Studies:   EXAM: XR CHEST PORT     INDICATION: SOB     COMPARISON: 8/15/2018     FINDINGS: A portable AP radiograph of the chest was obtained at 1305 hours. The  patient is on a cardiac monitor. Increase in interstitial prominence. The  cardiac and mediastinal contours and pulmonary vascularity are normal.  The  bones and soft tissues are grossly within normal limits.      IMPRESSION  IMPRESSION: Mild interstitial pulmonary edema. CHEST X-RAY, 2 views.     HISTORY:  Lung infiltrates, follow-up.     TECHNIQUE: PA and lateral views.     COMPARISON: 21 March 2019.     FINDINGS: The lungs are clear. The heart size is normal. The costophrenic angles  are sharp. The pulmonary vasculature is unremarkable. Interstitial edema  decreased. There are aortic arch calcifications. Included portion of the upper  abdomen is unremarkable.      IMPRESSION  IMPRESSION: Decreased interstitial edema. No acute abnormality on today's exam.    Labs: Results:       Chemistry Recent Labs     03/26/19  0556 03/25/19  1057   GLU 95 258*    137   K 4.0 3.8    99   CO2 33* 32   BUN 21 23   CREA 0.81 0.86   CA 8.8 8.7   AGAP 5* 6*      CBC w/Diff Recent Labs     03/26/19  0556 03/25/19  1057   WBC 11.2* 9.2   RBC 4.33 4.21   HGB 12.5 12.0   HCT 39.1 38.0    213   GRANS 73 79*   LYMPH 19 12*   EOS 0* 0*      Cardiac Enzymes No results for input(s): CPK, CKND1, LOU in the last 72 hours. No lab exists for component: CKRMB, TROIP   Coagulation No results for input(s): PTP, INR, APTT in the last 72 hours.     No lab exists for component: INREXT    Lipid Panel Lab Results   Component Value Date/Time    Cholesterol, total 142 06/26/2015 10:07 AM    HDL Cholesterol 43 06/26/2015 10:07 AM    LDL, calculated 55.8 06/26/2015 10:07 AM    VLDL, calculated 43.2 (H) 06/26/2015 10:07 AM    Triglyceride 216 (H) 06/26/2015 10:07 AM    CHOL/HDL Ratio 3.3 06/26/2015 10:07 AM      BNP No results for input(s): BNPP in the last 72 hours. Liver Enzymes No results for input(s): TP, ALB, TBIL, AP, SGOT, GPT in the last 72 hours. No lab exists for component: DBIL   Thyroid Studies Lab Results   Component Value Date/Time    TSH 1.080 03/21/2019 01:07 PM            Discharge Exam:  Visit Vitals  /69 (BP 1 Location: Left arm, BP Patient Position: At rest)   Pulse (!) 52   Temp 98.1 °F (36.7 °C)   Resp 18   Ht 5' 3.5\" (1.613 m)   Wt 89.2 kg (196 lb 9.6 oz)   SpO2 97%   BMI 34.28 kg/m²     General appearance: alert, cooperative, no distress, appears stated age  Lungs: clear to auscultation bilaterally  Heart: regular rate and rhythm, S1, S2 normal, no murmur, click, rub or gallop  Abdomen: soft, non-tender. Bowel sounds normal. No masses,  no organomegaly  Extremities: no cyanosis or edema  Neurologic: Grossly normal    Disposition: home  Discharge Condition: stable  Patient Instructions:   Current Discharge Medication List      START taking these medications    Details   predniSONE (DELTASONE) 20 mg tablet 40 mg po daily for 3 days, 20 mg po daily for 3 days, 10 mg po daily for 3 days. Qty: 12 Tab, Refills: 0         CONTINUE these medications which have CHANGED    Details   niacin ER (NIASPAN) 500 mg tablet TAKE ONE TABLET BY MOUTH ONCE DAILY FOR TRIGLYCERIDES AND CHOLESTEROL. Qty: 30 Tab, Refills: 5         CONTINUE these medications which have NOT CHANGED    Details   HYDROcodone-acetaminophen (NORCO)  mg tablet Take 1 Tab by mouth every four (4) hours as needed for Pain.      tiotropium (SPIRIVA) 18 mcg inhalation capsule Take 1 Cap by inhalation daily. Qty: 30 Cap, Refills: 0      losartan (COZAAR) 25 mg tablet TAKE ONE TABLET BY MOUTH EVERY DAY FOR BLOOD PRESSURE  Qty: 30 Tab, Refills: 11      fenofibrate micronized (LOFIBRA) 200 mg capsule TAKE ONE CAPSULE BY MOUTH EACH NIGHT AT BEDTIME FOR TRIGLYCERIDES AND CHOLESTEROL.   Qty: 30 Cap, Refills: 5      OXYGEN-AIR DELIVERY SYSTEMS 2 L by Nasal route as needed (SOB). ALPRAZolam (XANAX) 2 mg tablet Take 0.5 Tabs by mouth three (3) times daily as needed for Anxiety. Max Daily Amount: 3 mg. Take / use AM day of surgery  per anesthesia protocols. Indications: ANXIETY WITH DEPRESSION  Qty: 1 Tab, Refills: 0    Associated Diagnoses: Acute on chronic respiratory failure with hypoxia and hypercapnia (HCC)      albuterol-ipratropium (DUO-NEB) 2.5 mg-0.5 mg/3 ml nebu 3 mL by Nebulization route every six (6) hours as needed. Qty: 30 Nebule, Refills: 0      fluticasone-salmeterol (ADVAIR) 250-50 mcg/dose diskus inhaler Take 1 Puff by inhalation two (2) times a day. Qty: 1 Inhaler, Refills: 1      metoprolol tartrate (LOPRESSOR) 25 mg tablet Take 1 Tab by mouth two (2) times a day. Qty: 60 Tab, Refills: 0      clopidogrel (PLAVIX) 75 mg tab Take 1 Tab by mouth daily. Qty: 90 Tab, Refills: 3      loperamide (IMMODIUM) 2 mg tablet Take 2 mg by mouth four (4) times daily as needed for Diarrhea. pantoprazole (PROTONIX) 40 mg tablet Take 40 mg by mouth two (2) times a day. Take / use AM day of surgery  per anesthesia protocols. Indications: GASTROESOPHAGEAL REFLUX      meclizine (ANTIVERT) 25 mg tablet Take 25 mg by mouth three (3) times daily as needed. Indications: VERTIGO      albuterol (PROAIR HFA) 90 mcg/actuation inhaler Take 2 Puffs by inhalation every four (4) hours as needed. Take / use AM day of surgery  per anesthesia protocols if needed      aspirin 81 mg tablet Take 81 mg by mouth daily. Take / use AM day of surgery  per anesthesia protocols. escitalopram (LEXAPRO) 20 mg tablet Take 20 mg by mouth daily. Take / use AM day of surgery  per anesthesia protocols. Indications: ANXIETY WITH DEPRESSION      levothyroxine (SYNTHROID) 100 mcg tablet Take 100 mcg by mouth Daily (before breakfast). Per anesthesia protocol:instructed to take am of surgery.   Indications: HYPOTHYROIDISM      rosuvastatin (CRESTOR) 10 mg tablet Take 10 mg by mouth nightly. Indications: HYPERCHOLESTEROLEMIA      insulin glargine (LANTUS) 100 unit/mL injection 80 Units by SubCUTAneous route nightly. Indications: TYPE 2 DIABETES MELLITUS      guaiFENesin (MUCINEX) 1,200 mg Ta12 ER tablet Take 1,200 mg by mouth two (2) times a day. Indications: COLD SYMPTOMS, Cough      nitroglycerin (NITROSTAT) 0.4 mg SL tablet 0.4 mg by SubLINGual route every five (5) minutes as needed. Take / use AM day of surgery  per anesthesia protocols if needed         STOP taking these medications       insulin lispro protamine/insulin lispro (HUMALOG MIX 75-25,U-100,INSULN) 100 unit/mL (75-25) injection Comments:   Reason for Stopping:         insulin lispro protamine/insulin lispro (HUMALOG MIX 75-25,U-100,INSULN) 100 unit/mL (75-25) injection Comments:   Reason for Stopping:               Activity: Activity as tolerated  Diet: Cardiac Diet and Diabetic Diet  Wound Care: None needed    Follow-up  ·   Follow up with [place] [facility] in 2-3 weeks as scheduled.   Time spent to discharge patient 35 minutes  Signed:  Lashonda Patino MD  3/26/2019  8:19 AM

## 2019-03-26 NOTE — PROGRESS NOTES
Inpatient referral to [facility]. In hospital visit with patient this am. Patient up in room walking around during visit. Discussed RN CCM services with patient. Patient in agreement for RN to outreach upon discharge. RRAT: 22- 2 admits since 2/4/19 PMH: COPD, DM, HTN, JEREMÍAS (CPAP), ASCAD, Stable Angina, Hypothyroidism, Tobacco Abuse, Morbid Obesity PCP: Dr. Angela Piper, [facility] Internal Medicine Plan: Patient to discharge today. Link with Symone VÁZQUEZ CCM for [facility] call and follow up. This note will not be viewable in [address]

## 2019-03-26 NOTE — PROGRESS NOTES
Met with pt about home oxygen [facility] and has portable tanks and concentrator at home, pt asked to have family bring portable tank to hospital to use for drive home, states she will call sister to see if she has left to come to get her yet.

## 2019-03-26 NOTE — DISCHARGE INSTRUCTIONS
Patient Education        Chronic Obstructive Pulmonary Disease (COPD): Care Instructions  Your Care Instructions    Chronic obstructive pulmonary disease (COPD) is a general term for a group of lung diseases, including emphysema and chronic bronchitis. People with COPD have decreased airflow in and out of the lungs, which makes it hard to breathe. The airways also can get clogged with thick mucus. Cigarette smoking is a major cause of COPD. Although there is no cure for COPD, you can slow its progress. Following your treatment plan and taking care of yourself can help you feel better and live longer. Follow-up care is a key part of your treatment and safety. Be sure to make and go to all appointments, and call your doctor if you are having problems. It's also a good idea to know your test results and keep a list of the medicines you take. How can you care for yourself at home?   Staying healthy    · Do not smoke. This is the most important step you can take to prevent more damage to your lungs. If you need help quitting, talk to your doctor about stop-smoking programs and medicines. These can increase your chances of quitting for good.     · Avoid colds and flu. Get a pneumococcal vaccine shot. If you have had one before, ask your doctor whether you need a second dose. Get the flu vaccine every fall. If you must be around people with colds or the flu, wash your hands often.     · Avoid secondhand smoke, air pollution, and high altitudes. Also avoid cold, dry air and hot, humid air. Stay at home with your windows closed when air pollution is bad.    Medicines and oxygen therapy    · Take your medicines exactly as prescribed. Call your doctor if you think you are having a problem with your medicine.     · You may be taking medicines such as:  ? Bronchodilators. These help open your airways and make breathing easier. Bronchodilators are either short-acting (work for 6 to 9 hours) or long-acting (work for 24 hours). You inhale most bronchodilators, so they start to act quickly. Always carry your quick-relief inhaler with you in case you need it while you are away from home. ? Corticosteroids (prednisone, budesonide). These reduce airway inflammation. They come in pill or inhaled form. You must take these medicines every day for them to work well.     · A spacer may help you get more inhaled medicine to your lungs. Ask your doctor or pharmacist if a spacer is right for you. If it is, ask how to use it properly.     · Do not take any vitamins, over-the-counter medicine, or herbal products without talking to your doctor first.     · If your doctor prescribed antibiotics, take them as directed. Do not stop taking them just because you feel better. You need to take the full course of antibiotics.     · Oxygen therapy boosts the amount of oxygen in your blood and helps you breathe easier. Use the flow rate your doctor has recommended, and do not change it without talking to your doctor first.   Activity    · Get regular exercise. Walking is an easy way to get exercise. Start out slowly, and walk a little more each day.     · Pay attention to your breathing. You are exercising too hard if you cannot talk while you are exercising.     · Take short rest breaks when doing household chores and other activities.     · Learn breathing methods--such as breathing through pursed lips--to help you become less short of breath.     · If your doctor has not set you up with a pulmonary rehabilitation program, talk to him or her about whether rehab is right for you. Rehab includes exercise programs, education about your disease and how to manage it, help with diet and other changes, and emotional support. Diet    · Eat regular, healthy meals. Use bronchodilators about 1 hour before you eat to make it easier to eat. Eat several small meals instead of three large ones.  Drink beverages at the end of the meal. Avoid foods that are hard to chew.     · Eat foods that contain protein so that you do not lose muscle mass.     · Talk with your doctor if you gain too much weight or if you lose weight without trying.    Mental health    · Talk to your family, friends, or a therapist about your feelings. It is normal to feel frightened, angry, hopeless, helpless, and even guilty. Talking openly about bad feelings can help you cope. If these feelings last, talk to your doctor. When should you call for help? Call 911 anytime you think you may need emergency care. For example, call if:    · You have severe trouble breathing.    Call your doctor now or seek immediate medical care if:    · You have new or worse trouble breathing.     · You cough up blood.     · You have a fever.    Watch closely for changes in your health, and be sure to contact your doctor if:    · You cough more deeply or more often, especially if you notice more mucus or a change in the color of your mucus.     · You have new or worse swelling in your legs or belly.     · You are not getting better as expected. Where can you learn more? Go to http://anibal-lamont.info/. Mendel Haste in the search box to learn more about \"Chronic Obstructive Pulmonary Disease (COPD): Care Instructions. \"  Current as of: September 5, 2018  Content Version: 11.9  © 6730-8539 [employer]. Care instructions adapted under license by [employer] (which disclaims liability or warranty for this information). If you have questions about a medical condition or this instruction, always ask your healthcare professional. [place] [address] any warranty or liability for your use of this information.          DISCHARGE SUMMARY from Nurse    PATIENT INSTRUCTIONS:    After general anesthesia or intravenous sedation, for 24 hours or while taking prescription Narcotics:  · Limit your activities  · Do not drive and operate hazardous machinery  · Do not make important personal or business decisions  · Do  not drink alcoholic beverages  · If you have not urinated within 8 hours after discharge, please contact your surgeon on call. Report the following to your surgeon:  · Excessive pain, swelling, redness or odor of or around the surgical area  · Temperature over 100.5  · Nausea and vomiting lasting longer than 4 hours or if unable to take medications  · Any signs of decreased circulation or nerve impairment to extremity: change in color, persistent  numbness, tingling, coldness or increase pain  · Any questions    What to do at Home:  Recommended activity: Activity as tolerated, per MD instructions    If you experience any of the following symptoms fever > 101, nausea, vomiting, pain to MD, chest pain and/or shortness of breath to ER please follow up with MD.    *  Please give a list of your current medications to your Primary Care Provider. *  Please update this list whenever your medications are discontinued, doses are      changed, or new medications (including over-the-counter products) are added. *  Please carry medication information at all times in case of emergency situations. These are general instructions for a healthy lifestyle:    No smoking/ No tobacco products/ Avoid exposure to second hand smoke  Surgeon General's Warning:  Quitting smoking now greatly reduces serious risk to your health. Obesity, smoking, and sedentary lifestyle greatly increases your risk for illness    A healthy diet, regular physical exercise & weight monitoring are important for maintaining a healthy lifestyle    You may be retaining fluid if you have a history of heart failure or if you experience any of the following symptoms:  Weight gain of 3 pounds or more overnight or 5 pounds in a week, increased swelling in our hands or feet or shortness of breath while lying flat in bed.   Please call your doctor as soon as you notice any of these symptoms; do not wait until your next office visit. Recognize signs and symptoms of STROKE:    F-face looks uneven    A-arms unable to move or move unevenly    S-speech slurred or non-existent    T-time-call 911 as soon as signs and symptoms begin-DO NOT go       Back to bed or wait to see if you get better-TIME IS BRAIN. Warning Signs of HEART ATTACK     Call 911 if you have these symptoms:   Chest discomfort. Most heart attacks involve discomfort in the center of the chest that lasts more than a few minutes, or that goes away and comes back. It can feel like uncomfortable pressure, squeezing, fullness, or pain.  Discomfort in other areas of the upper body. Symptoms can include pain or discomfort in one or both arms, the back, neck, jaw, or stomach.  Shortness of breath with or without chest discomfort.  Other signs may include breaking out in a cold sweat, nausea, or lightheadedness. Don't wait more than five minutes to call 911 - MINUTES MATTER! Fast action can save your life. Calling 911 is almost always the fastest way to get lifesaving treatment. Emergency Medical Services staff can begin treatment when they arrive -- up to an hour sooner than if someone gets to the hospital by car. The discharge information has been reviewed with the patient. The patient verbalized understanding. Discharge medications reviewed with the patient and appropriate educational materials and side effects teaching were provided.   ___________________________________________________________________________________________________________________________________

## 2019-03-26 NOTE — PROGRESS NOTES
Aleena Bynum Admission Date: 3/21/2019 Daily Progress Note: 3/26/2019 The patient's chart is reviewed and the patient is discussed with the staff. Patient is M 60 y.o.  female seen and evaluated at the request of Dr. David Ch has a history of morbid obesity, anxiety/depression, DM, GERD, IBS,  Hypothyroidism, uterine CA, HTN, HL, CAD s/p PCI, JEREMÍAS intolerant of CPAP and maintained on O2 at 3lpm with sleep, COPD, chronic respiratory failure, and tobacco abuse (~50 pack year history). She was admitted in 2/4-2/8/2019 with Influenza A and required BIPAP during her admission. She was to be seen by our office in follow up but has not yet done so (apparently follows with Pulmonary in [place]) 
  
Patient states that she never fully recovered from her last hospitalization.  She completed her abx/steroids but never regained her energy. Luis Fernando Garcia continues to smoke 0.5 ppd.  CXR with mild pulmonary edema, BNP 28.  She was admitted by the Hospitalist service, started on nebs, steroids, and abx.  We are consulted to assist with her care. AF, currently on o2 at 2 lpm with o2 sat 96%.  WBC 13. 6. 
  
 
 
 
Subjective:  
 
 
Better On 2L NC 
 
Current Facility-Administered Medications Medication Dose Route Frequency  levoFLOXacin (LEVAQUIN) tablet 750 mg  750 mg Oral Q24H  predniSONE (DELTASONE) tablet 40 mg  40 mg Oral DAILY WITH BREAKFAST  albuterol (PROVENTIL VENTOLIN) nebulizer solution 2.5 mg  2.5 mg Nebulization QID RT  
 budesonide (PULMICORT) 500 mcg/2 ml nebulizer suspension  500 mcg Nebulization BID RT  
 insulin glargine (LANTUS) injection 98 Units  98 Units SubCUTAneous QHS  insulin lispro (HUMALOG) injection   SubCUTAneous AC&HS  ALPRAZolam (XANAX) tablet 1 mg  1 mg Oral QID PRN  
 nicotine (NICODERM CQ) 21 mg/24 hr patch 1 Patch  1 Patch TransDERmal DAILY  HYDROcodone-acetaminophen (NORCO)  mg tablet 1 Tab  1 Tab Oral Q6H PRN  
 aspirin chewable tablet 81 mg  81 mg Oral DAILY  clopidogrel (PLAVIX) tablet 75 mg  75 mg Oral DAILY  escitalopram oxalate (LEXAPRO) tablet 20 mg  20 mg Oral DAILY  fenofibrate (LOFIBRA) tablet 160 mg  160 mg Oral DAILY  fluticasone-salmeterol (ADVAIR) 250mcg-50mcg/puff (Patient Supplied)  1 Puff Inhalation BID  
 guaiFENesin ER (MUCINEX) tablet 1,200 mg  1,200 mg Oral BID  levothyroxine (SYNTHROID) tablet 100 mcg  100 mcg Oral ACB  losartan (COZAAR) tablet 25 mg  25 mg Oral DAILY  metoprolol tartrate (LOPRESSOR) tablet 25 mg  25 mg Oral BID  niacin ER (NIASPAN) tablet 500 mg  500 mg Oral QHS  pantoprazole (PROTONIX) tablet 40 mg  40 mg Oral BID  rosuvastatin (CRESTOR) tablet 10 mg  10 mg Oral QHS  tiotropium (SPIRIVA) inhalation capsule 18 mcg  1 Cap Inhalation DAILY  sodium chloride (NS) flush 5-40 mL  5-40 mL IntraVENous Q8H  
 sodium chloride (NS) flush 5-40 mL  5-40 mL IntraVENous PRN  
 acetaminophen (TYLENOL) tablet 650 mg  650 mg Oral Q4H PRN  
 ondansetron (ZOFRAN) injection 4 mg  4 mg IntraVENous Q4H PRN  
 senna-docusate (PERICOLACE) 8.6-50 mg per tablet 1 Tab  1 Tab Oral BID PRN  
 enoxaparin (LOVENOX) injection 40 mg  40 mg SubCUTAneous Q24H  nystatin (MYCOSTATIN) 100,000 unit/gram powder   Topical BID Review of Systems Constitutional: negative for fever, chills, sweats Cardiovascular: negative for chest pain, palpitations, syncope, edema Gastrointestinal:  negative for dysphagia, reflux, vomiting, diarrhea, abdominal pain, or melena Neurologic:  negative for focal weakness, numbness, headache Objective:  
 
Vitals:  
 03/26/19 1626 03/26/19 5884 03/26/19 3547 03/26/19 6197 BP: 117/62  129/69 Pulse: (!) 56  (!) 52 Resp: 18  18 Temp: 97.4 °F (36.3 °C)  98.1 °F (36.7 °C) SpO2: 94%  95% 97% Weight:  196 lb 9.6 oz (89.2 kg) Height:      
 
Intake and Output:  
03/24 1901 - 03/26 0700 In: 1020 [P.O.:1020] Out: - No intake/output data recorded. Physical Exam:  
Constitution:  the patient is well developed and in no acute distress EENMT:  Sclera clear, pupils equal, oral mucosa moist 
Respiratory: clear Cardiovascular:  RRR without M,G,R 
Gastrointestinal: soft and non-tender; with positive bowel sounds. Musculoskeletal: warm without cyanosis. There is no lower leg edema. Skin:  no jaundice or rashes, no wounds Neurologic: no gross neuro deficits Psychiatric:  alert and oriented x 3 CXR:  
 
 
LAB Recent Labs  
  03/26/19 
0703 03/25/19 
2107 03/25/19 
1617 03/25/19 
1106 03/25/19 
8627 GLUCPOC 98 213* 279* 249* 215* Recent Labs  
  03/26/19 
0556 03/25/19 
1057 WBC 11.2* 9.2 HGB 12.5 12.0 HCT 39.1 38.0  
 213 Recent Labs  
  03/26/19 
0556 03/25/19 
1057  137  
K 4.0 3.8  99 CO2 33* 32  
GLU 95 258* BUN 21 23 CREA 0.81 0.86  
MG  --  1.9 CA 8.8 8.7 Assessment:  (Medical Decision Making) Hospital Problems  Date Reviewed: 3/25/2019 Codes Class Noted POA * (Principal) COPD exacerbation ([facility] ICD-10-CM: J44.1 ICD-9-CM: 491.21  2/6/2019 Yes JEREMÍAS (obstructive sleep apnea)- intolerant of CPAP (Chronic) ICD-10-CM: G47.33 
ICD-9-CM: 327.23  2/4/2019 Yes Acute respiratory failure ([facility] ICD-10-CM: J96.00 
ICD-9-CM: 518.81  2/4/2019 Yes Morbid obesity ([facility] (Chronic) ICD-10-CM: E66.01 
ICD-9-CM: 278.01  4/17/2018 Yes DM (diabetes mellitus) w/o complication (Chronic) LQA-18-DK: E11.9 ICD-9-CM: 250.00  4/15/2018 Yes Tobacco abuse (Chronic) ICD-10-CM: Z72.0 ICD-9-CM: 305.1  4/15/2018 Yes Nocturnal hypoxemia (Chronic) ICD-10-CM: G47.34 
ICD-9-CM: 327.24  4/15/2018 Yes Plan:  (Medical Decision Making) --prednisone taper 
-follow up in our office More than 50% of the time documented was spent in face-to-face contact with the patient and in the care of the patient on the floor/unit where the patient is located.  
 
Haven Goldman MD

## 2019-03-27 ENCOUNTER — PATIENT OUTREACH (OUTPATIENT)
Dept: CASE MANAGEMENT | Age: 69
End: 2019-03-27

## 2019-03-28 LAB
BACTERIA SPEC CULT: NORMAL
BACTERIA SPEC CULT: NORMAL
SERVICE CMNT-IMP: NORMAL
SERVICE CMNT-IMP: NORMAL

## 2019-03-28 NOTE — PROGRESS NOTES
Date/Time of Call: 
 3/27/19 @ 4:37pm   
What was the patient hospitalized for? COPD Exacerbation, ARF Does the patient understand his/her diagnosis and/or treatment and what happened during the hospitalization? Yes Did the patient receive discharge instructions? Yes  
CM Assessed Risk for Readmission:  
 
 
Patient stated Risk for Readmission:  
 
 Yes No stated risk for readmission Review any discharge instructions [facility] Ask patient if they understand these. Do they have any questions? Voices understanding of discharge instructions Were home services ordered (nursing, PT, OT, ST, etc.)? No 
  
If so, has the first visit occurred? If not, why? (Assist with coordination of services if necessary.) 
 N/A Was any DME ordered? No DME ordered If so, has it been received? If not, why?  (Assist patient in obtaining DME orders &/or equipment if necessary.)  
 
N/A Complete a review of all medications (new, continued and discontinued meds per the D/C instructions and medication tab in [facility]). Dexamethasone Medications reviewed. Were all new prescriptions filled? If not, why?  (Assist patient in obtaining medications if necessary  escalate for CCM &/or SW if ongoing issues are verbalized by pt or anticipated) Yes Does the patient understand the purpose and dosing instructions for all medications? (If patient has questions, provide explanation and education.) Yes Does the patient have any problems in performing ADLs? (If patient is unable to perform ADLs  what is the limiting factor(s)? Do they have a support system that can assist? If no support system is present, discuss possible assistance that they may be able to obtain. Escalate for CCM/SW if ongoing issues are verbalized by pt or anticipated) Uses a cane and a walker to assist with ambulation. Support system-son and daughter in law Does the patient have all follow-up appointments scheduled? 7 day f/up with PCP?  
(f/up with PCP may be w/in 14 days if patient has a f/up with their specialist w/in 7 days) 7-14 day f/up with specialist?  
(or per discharge instructions) If f/up has not been made  what actions has the care coordinator made to accomplish this? Has transportation been arranged? PCP on 3/28/19 Pulmonologist on Any other questions or concerns expressed by the patient? None Schedule next appointment with TIFF Benjamin or refer to RN Case Manager/ per the workflow guidelines. When is care coordinators next follow-up call scheduled? If referred for CCM  what RN care manager was the referral assigned? SABINE Call Completed By:  
Darrius Jose RN This note will not be viewable in [address]

## 2019-03-28 NOTE — PROGRESS NOTES
Complex Case Management  Initial Assessment  Addendum to Connect Care Referral Source & Reason SABINE and HRRP Primary concerns per patient and/or pts family/caregiver Smoking and COPD Recent Hospitalization(s)/ED Visits Current with Home Health? 
- Agency? No  
Social Needs: - Able to afford medications? - Financial assessment? 
- Access to care? Transportation? Able to afford medications No indication for financial assessment Patient has Medicaid and Medicare Nutritional Assessment - Appetite? - Obesity? 
- Failure to Thrive? - Bowels ? Obesity per chart Cognitive Assessment 
- History of dementia 
- Health literacy  No history of dementia Mobility/Activity Assessment - Bed/chair bound? - Make use of assistive devices? - Does patient still drive? Cane and walker to assist her with ambulation Patient does not drive Plan/Interventions/Education - Follow up Appointments - Referrals No referrals needed at this time Plan: Review COPD disease, prognosis, and exacerbation Interventions: Education on diagnosis and importance of medication. Discuss use of nebulizer, aerosols, oxygen. Smoking cessation: provide support and encourage Goals: patient will voice understanding of self-management of chronic disease. This note will not be viewable in [address]

## 2019-04-05 ENCOUNTER — PATIENT OUTREACH (OUTPATIENT)
Dept: CASE MANAGEMENT | Age: 69
End: 2019-04-05

## 2019-04-05 NOTE — PROGRESS NOTES
Left a voicemail with my contact information requesting a call back. This note will not be viewable in [address]

## 2019-04-11 ENCOUNTER — PATIENT OUTREACH (OUTPATIENT)
Dept: CASE MANAGEMENT | Age: 69
End: 2019-04-11

## 2019-04-11 NOTE — PROGRESS NOTES
Unable to leave a message. Voicemail box has not been set up. This note will not be viewable in [address]

## 2019-04-17 ENCOUNTER — PATIENT OUTREACH (OUTPATIENT)
Dept: CASE MANAGEMENT | Age: 69
End: 2019-04-17

## 2019-04-17 NOTE — PROGRESS NOTES
Unable to leave a voicemail. The mailbox has not been set up. This note will not be viewable in [address]

## 2019-04-18 NOTE — PROGRESS NOTES
Community Care Management  Follow up Outreach Note Outreach type: Phone call: 
  
Date/Time of Outreach: 4/17/19 Reason for follow-up: 
 CCM for HRRP Disease specific complaints/issues: SOB Patient progress towards goals set from last contact: 
 Goal met for medication adherence, oxygen therapy. Has patient attended any PCP or specialist follow-up appointments since last contact? What was outcome of appointment? When is next follow-up scheduled? Pulmonologist on 4/16/19 Review medications. Any medication changes since last outreach? Does patient have any questions or issues related to their medications? No medication changes. Reviewed meds, no questions or concerns. Home health active? If yes  any issue? Progress? No  
Referrals needed? 
(SW, Diabetes education, HH, etc. ) No  
Other issues/Miscellaneous? (Transportation, access to meals, ability to perform ADLs, adequate caregiver support, etc.) She has requested a hospital bed. Next Outreach Scheduled: 1-2 weeks or before as needed. Next Steps/Goals: 
 Plan: assist patient with getting a hospital bed. Discuss/educate s/sx COPD exacerbation including the fact that she is a smoker. Community Care Manager: Silas Stephenson RN Spoke with patients daughter in law with patients permission. Patient is having episodes of SOB. She has requested a hospital bed so that she can rest. It is difficult for her to breathe when lying down. Daughter in law has bought several pillows for patient to use while in bed but this has not worked thus far. Patient continues to smoke. Medications reviewed. Contact information left for family to call as needed. This note will not be viewable in [address]

## 2019-04-23 ENCOUNTER — PATIENT OUTREACH (OUTPATIENT)
Dept: CASE MANAGEMENT | Age: 69
End: 2019-04-23

## 2019-04-24 ENCOUNTER — PATIENT OUTREACH (OUTPATIENT)
Dept: CASE MANAGEMENT | Age: 69
End: 2019-04-24

## 2019-04-24 NOTE — PROGRESS NOTES
I spoke with patient's PCP. Requested an order for a hospital bed. Information provided to MD office that [facility] will need to fulfill this orders. This note will not be viewable in [address]

## 2019-05-08 ENCOUNTER — PATIENT OUTREACH (OUTPATIENT)
Dept: CASE MANAGEMENT | Age: 69
End: 2019-05-08

## 2019-05-09 NOTE — PROGRESS NOTES
Community Care Management  Follow up Outreach Note Outreach type: Phone call: 
  
Date/Time of Outreach: 5/8/19 Reason for follow-up: 
 CCM for HRRP Disease specific complaints/issues: SOB Patient progress towards goals set from last contact: 
 Goal met for medication adherence, oxygen therapy. Has patient attended any PCP or specialist follow-up appointments since last contact? What was outcome of appointment? When is next follow-up scheduled? None Review medications. Any medication changes since last outreach? Does patient have any questions or issues related to their medications? No medication changes. Reviewed meds, no questions or concerns. Home health active? If yes  any issue? Progress? No  
Referrals needed? 
(SW, Diabetes education, HH, etc. ) No  
Other issues/Miscellaneous? (Transportation, access to meals, ability to perform ADLs, adequate caregiver support, etc.) She has requested a hospital bed. Next Outreach Scheduled: 1-2 weeks or before as needed. Next Steps/Goals: 
 Plan: assist patient with getting a hospital bed. Discuss/educate s/sx COPD exacerbation including the fact that she is a smoker. Community Care Manager: Atiya Mcmullen, KATHIE Spoke with patient today. She continues to have episodes of SOB. Patient is having episodes of SOB. Patient cancelled her pulmonologist appointment. I have encouraged her to see a pulmonologist asap. She voices understanding. She has requested a hospital bed for orthopnea. Daughter in law has bought several pillows for patient to use while in bed but this has not worked thus far. In April 2019 I contacted patients PCP Dr. Gefofrey Ventura and requested assistance with getting a patient a hospital bed and have information faxed to a [employer]. For follow up. Patient continues to smoke. Medications reviewed. Contact information left for family to call as needed. This note will not be viewable in [address]

## 2019-05-31 ENCOUNTER — PATIENT OUTREACH (OUTPATIENT)
Dept: CASE MANAGEMENT | Age: 69
End: 2019-05-31

## 2019-05-31 NOTE — PROGRESS NOTES
Community Care Management  Follow up Outreach Note   Outreach type: Phone call:      Date/Time of Outreach: 5/31/19       Reason for follow-up:    CCM for HRRP   Disease specific complaints/issues: SOB      Patient progress towards goals set from last contact:    Goal met for medication adherence, oxygen therapy. Has patient attended any PCP or specialist follow-up appointments since last contact? What was outcome of appointment? When is next follow-up scheduled? None         Review medications. Any medication changes since last outreach? Does patient have any questions or issues related to their medications? No medication changes. Reviewed meds, no questions or concerns. Home health active? If yes  any issue? Progress? No   Referrals needed?  (SW, Diabetes education, HH, etc. ) No   Other issues/Miscellaneous? (Transportation, access to meals, ability to perform ADLs, adequate caregiver support, etc.)       She has requested a hospital bed. States that she got a letter about a hospital bed but she does not remember what is said. Next Outreach Scheduled: 1-2 weeks or before as needed. Next Steps/Goals:    Plan: assist patient with getting a hospital bed. Discuss/educate s/sx COPD exacerbation including the fact that she is a smoker. Community Care Manager: Gera Nugent RN            Spoke with patient today. She continues to have episodes of SOB. Patient cancelled her pulmonologist appointment. I have encouraged her to see a pulmonologist asap. She voices understanding. No pulmonologist appointment has been scheduled. Educated patient again on the importance of follow up. She has requested a hospital bed for orthopnea. Daughter in law has bought several pillows for patient to use while in bed but this has not worked thus far. Patient continues to smoke. Medications reviewed. Contact information left for family to call as needed. This note will not be viewable in MyChart.

## 2019-06-26 ENCOUNTER — PATIENT OUTREACH (OUTPATIENT)
Dept: CASE MANAGEMENT | Age: 69
End: 2019-06-26

## 2019-07-02 ENCOUNTER — PATIENT OUTREACH (OUTPATIENT)
Dept: CASE MANAGEMENT | Age: 69
End: 2019-07-02

## 2019-07-10 ENCOUNTER — PATIENT OUTREACH (OUTPATIENT)
Dept: CASE MANAGEMENT | Age: 69
End: 2019-07-10

## 2019-07-15 ENCOUNTER — PATIENT OUTREACH (OUTPATIENT)
Dept: CASE MANAGEMENT | Age: 69
End: 2019-07-15

## 2020-08-03 ENCOUNTER — HOSPITAL ENCOUNTER (EMERGENCY)
Age: 70
Discharge: HOME | End: 2020-08-03
Attending: EMERGENCY MEDICINE
Payer: MEDICARE

## 2020-08-03 ENCOUNTER — APPOINTMENT (OUTPATIENT)
Dept: ULTRASOUND IMAGING | Age: 70
End: 2020-08-03
Attending: PHYSICIAN ASSISTANT
Payer: MEDICARE

## 2020-08-03 ENCOUNTER — APPOINTMENT (OUTPATIENT)
Dept: GENERAL RADIOLOGY | Age: 70
End: 2020-08-03
Attending: PHYSICIAN ASSISTANT
Payer: MEDICARE

## 2020-08-03 VITALS
HEART RATE: 80 BPM | WEIGHT: 220 LBS | TEMPERATURE: 98.3 F | HEIGHT: 64 IN | OXYGEN SATURATION: 94 % | SYSTOLIC BLOOD PRESSURE: 140 MMHG | BODY MASS INDEX: 37.56 KG/M2 | DIASTOLIC BLOOD PRESSURE: 76 MMHG | RESPIRATION RATE: 16 BRPM

## 2020-08-03 DIAGNOSIS — B35.9 TINEA: ICD-10-CM

## 2020-08-03 DIAGNOSIS — R30.0 DYSURIA: Primary | ICD-10-CM

## 2020-08-03 LAB
ALBUMIN SERPL-MCNC: 3.4 G/DL (ref 3.2–4.6)
ALBUMIN/GLOB SERPL: 0.7 {RATIO} (ref 1.2–3.5)
ALP SERPL-CCNC: 140 U/L (ref 50–136)
ALT SERPL-CCNC: 23 U/L (ref 12–65)
ANION GAP SERPL CALC-SCNC: 6 MMOL/L (ref 7–16)
AST SERPL-CCNC: 15 U/L (ref 15–37)
BACTERIA URNS QL MICRO: 0 /HPF
BASOPHILS # BLD: 0.1 K/UL (ref 0–0.2)
BASOPHILS NFR BLD: 1 % (ref 0–2)
BILIRUB SERPL-MCNC: 0.5 MG/DL (ref 0.2–1.1)
BUN SERPL-MCNC: 15 MG/DL (ref 8–23)
CALCIUM SERPL-MCNC: 9.3 MG/DL (ref 8.3–10.4)
CASTS URNS QL MICRO: NORMAL /LPF
CHLORIDE SERPL-SCNC: 102 MMOL/L (ref 98–107)
CO2 SERPL-SCNC: 27 MMOL/L (ref 21–32)
CREAT SERPL-MCNC: 0.77 MG/DL (ref 0.6–1)
CRP SERPL-MCNC: 0.6 MG/DL (ref 0–0.9)
DIFFERENTIAL METHOD BLD: ABNORMAL
EOSINOPHIL # BLD: 0.3 K/UL (ref 0–0.8)
EOSINOPHIL NFR BLD: 2 % (ref 0.5–7.8)
EPI CELLS #/AREA URNS HPF: NORMAL /HPF
ERYTHROCYTE [DISTWIDTH] IN BLOOD BY AUTOMATED COUNT: 12.4 % (ref 11.9–14.6)
GLOBULIN SER CALC-MCNC: 4.8 G/DL (ref 2.3–3.5)
GLUCOSE SERPL-MCNC: 220 MG/DL (ref 65–100)
HCT VFR BLD AUTO: 45.1 % (ref 35.8–46.3)
HGB BLD-MCNC: 14.9 G/DL (ref 11.7–15.4)
IMM GRANULOCYTES # BLD AUTO: 0.1 K/UL (ref 0–0.5)
IMM GRANULOCYTES NFR BLD AUTO: 1 % (ref 0–5)
LACTATE SERPL-SCNC: 1.3 MMOL/L (ref 0.4–2)
LYMPHOCYTES # BLD: 3.2 K/UL (ref 0.5–4.6)
LYMPHOCYTES NFR BLD: 28 % (ref 13–44)
MCH RBC QN AUTO: 29.2 PG (ref 26.1–32.9)
MCHC RBC AUTO-ENTMCNC: 33 G/DL (ref 31.4–35)
MCV RBC AUTO: 88.3 FL (ref 79.6–97.8)
MONOCYTES # BLD: 0.7 K/UL (ref 0.1–1.3)
MONOCYTES NFR BLD: 6 % (ref 4–12)
NEUTS SEG # BLD: 7.2 K/UL (ref 1.7–8.2)
NEUTS SEG NFR BLD: 62 % (ref 43–78)
NRBC # BLD: 0 K/UL (ref 0–0.2)
PLATELET # BLD AUTO: 239 K/UL (ref 150–450)
PMV BLD AUTO: 10.7 FL (ref 9.4–12.3)
POTASSIUM SERPL-SCNC: 4.2 MMOL/L (ref 3.5–5.1)
PROT SERPL-MCNC: 8.2 G/DL (ref 6.3–8.2)
RBC # BLD AUTO: 5.11 M/UL (ref 4.05–5.2)
RBC #/AREA URNS HPF: NORMAL /HPF
SODIUM SERPL-SCNC: 135 MMOL/L (ref 136–145)
WBC # BLD AUTO: 11.5 K/UL (ref 4.3–11.1)
WBC URNS QL MICRO: NORMAL /HPF

## 2020-08-03 PROCEDURE — 86140 C-REACTIVE PROTEIN: CPT

## 2020-08-03 PROCEDURE — 85025 COMPLETE CBC W/AUTO DIFF WBC: CPT

## 2020-08-03 PROCEDURE — 99284 EMERGENCY DEPT VISIT MOD MDM: CPT

## 2020-08-03 PROCEDURE — 83605 ASSAY OF LACTIC ACID: CPT

## 2020-08-03 PROCEDURE — 81015 MICROSCOPIC EXAM OF URINE: CPT

## 2020-08-03 PROCEDURE — 74011250636 HC RX REV CODE- 250/636: Performed by: PHYSICIAN ASSISTANT

## 2020-08-03 PROCEDURE — 80053 COMPREHEN METABOLIC PANEL: CPT

## 2020-08-03 PROCEDURE — 96374 THER/PROPH/DIAG INJ IV PUSH: CPT

## 2020-08-03 PROCEDURE — 87086 URINE CULTURE/COLONY COUNT: CPT

## 2020-08-03 PROCEDURE — 71046 X-RAY EXAM CHEST 2 VIEWS: CPT

## 2020-08-03 PROCEDURE — 76705 ECHO EXAM OF ABDOMEN: CPT

## 2020-08-03 RX ORDER — FLUCONAZOLE 150 MG/1
150 TABLET ORAL
Qty: 2 TAB | Refills: 0 | Status: SHIPPED | OUTPATIENT
Start: 2020-08-03 | End: 2020-08-11

## 2020-08-03 RX ORDER — NITROFURANTOIN 25; 75 MG/1; MG/1
100 CAPSULE ORAL 2 TIMES DAILY
Qty: 20 CAP | Refills: 0 | Status: SHIPPED | OUTPATIENT
Start: 2020-08-03 | End: 2020-08-13

## 2020-08-03 RX ORDER — ONDANSETRON 2 MG/ML
4 INJECTION INTRAMUSCULAR; INTRAVENOUS
Status: COMPLETED | OUTPATIENT
Start: 2020-08-03 | End: 2020-08-03

## 2020-08-03 RX ORDER — SODIUM CHLORIDE 9 MG/ML
1000 INJECTION, SOLUTION INTRAVENOUS ONCE
Status: COMPLETED | OUTPATIENT
Start: 2020-08-03 | End: 2020-08-03

## 2020-08-03 RX ADMIN — SODIUM CHLORIDE 1000 ML: 9 INJECTION, SOLUTION INTRAVENOUS at 13:48

## 2020-08-03 RX ADMIN — ONDANSETRON 4 MG: 2 INJECTION INTRAMUSCULAR; INTRAVENOUS at 13:47

## 2020-08-03 NOTE — ED PROVIDER NOTES
Patient is here with some dysuria and polyuria for the last 3 to 4 days. No fever. She has had surgery in the past on her urethra and a mesh for her bladder and has had recurrent urinary tract infections as well. She is a diabetic. She also has a tender area in the soft tissue of her suprapubic area. There is no redness or swelling to that area. No fever, nausea, vomiting, chest pain, shortness of breath, abdominal pain, dizziness, weakness, dyspnea on exertion, orthopnea, swelling/tingling weakness to the arms or legs or other new symptoms. She did ambulate to the room without difficulty and is well-hydrated. She does have a history of COPD. The history is provided by the patient. Bladder Infection    This is a new problem. The current episode started more than 2 days ago. The problem occurs every urination. The problem has been gradually worsening. The quality of the pain is described as burning. The pain is at a severity of 8/10. The pain is moderate. There has been no fever. She is not sexually active. Associated symptoms include nausea and urgency. Pertinent negatives include no chills, no sweats, no vomiting, no discharge, no frequency, no hematuria, no hesitancy, no possible pregnancy and no flank pain. She has tried nothing for the symptoms. Past Medical History:   Diagnosis Date    Anxiety     managed with medication     Arthritis     ASCAD - of the native vessel 2/27/2013    Asthma     CAD (coronary artery disease)     stents x 3    Cancer (HCC)     hx uterine cancer    Chronic pain     d/t fibromyalgia    Claustrophobia     COPD     PRN inhaler; uses once every 2-3 months    Current smoker     Degenerative joint disease     Depression     managed with medication     Diabetes ([facility] [address]     type 2; normal fasting bs (  );  Insulin dependent; checks bs 4 times per day    Diverticulosis     managed with diet     DM (diabetes mellitus) w/o complication 9/20/8929    Dyslipidemia     Fatty liver     Fibromyalgia     GERD (gastroesophageal reflux disease)     controlled w/med    H/O echocardiogram 01/20/14    EF >69.7%    HTN (hypertension) - controlled, benign 2/27/2013    Hyperlipemia     managed with medication     Hypertension     controlled w/med    Hypothyroidism     managed with medication     IBS (irritable bowel syndrome)     w/diverticulitis    Influenza A (H5N1) 2/4/2019    Lipid - hyperlipidemia other unsp dyslipidemia 6/9/2016    Macular degeneration     Mass of kidney     Murmur     monitored by Cardiologist, Toyin Mccoy; last echo 1/20/14    Myalgia and myositis, unspecified     no present treatment     Obesity     BMI 39.1    JEREMÍAS (obstructive sleep apnea)- intolerant of CPAP 2/4/2019    Osteoarthrosis, unspecified whether generalized or localized, unspecified site     no present treatment     Psychiatric disorder     anxiety/depression    PUD (peptic ulcer disease) 1980    S/P total knee arthroplasty 6/22/2016    Seizures ([address] 12/2011    s/p stopping xanax abruptly    Sleep apnea     noncompliant with CPAP    Status post total knee replacement 2/27/2013    Syncope and collapse 6/9/2016    Tobacco use disorder 6/9/2016    Urinary, incontinence, stress female     w/ cystocele-rectocele repair    Vertigo     managed with prn medication        Past Surgical History:   Procedure Laterality Date    CARDIAC SURG PROCEDURE UNLIST      3 caths total; stents x3, last stent 02/2014    COLONOSCOPY      HX ANKLE FRACTURE TX Right     repair with hardware    HX APPENDECTOMY      HX BREAST LUMPECTOMY Bilateral     HX CARPAL TUNNEL RELEASE Right     HX HYSTERECTOMY  1979    HX KNEE ARTHROSCOPY Right     HX KNEE REPLACEMENT Left     HX LAP CHOLECYSTECTOMY      HX TUBAL LIGATION      HX UROLOGICAL      sling         Family History:   Problem Relation Age of Onset    Heart Attack Mother         MI age 70   [facility] Diabetes Mother     Heart Disease Mother     Heart Disease Sister     Heart Failure Sister 61        cabg    Diabetes Sister     Heart Disease Brother     Heart Attack Brother 28        mi    Cancer Brother     Heart Failure Sister 61        cabg    Heart Disease Sister     Cancer Father     Alzheimer Father     Heart Attack Brother        Social History     Socioeconomic History    Marital status:      Spouse name: Not on file    Number of children: Not on file    Years of education: Not on file    Highest education level: Not on file   Occupational History    Not on file   Social Needs    Financial resource strain: Not on file    Food insecurity     Worry: Not on file     Inability: Not on file    Transportation needs     Medical: Not on file     Non-medical: Not on file   Tobacco Use    Smoking status: Current Every Day Smoker     Packs/day: 0.25     Years: 53.00     Pack years: 13.25    Smokeless tobacco: Never Used   Substance and Sexual Activity    Alcohol use: No    Drug use: No    Sexual activity: Not on file   Lifestyle    Physical activity     Days per week: Not on file     Minutes per session: Not on file    Stress: Not on file   Relationships    Social connections     Talks on phone: Not on file     Gets together: Not on file     Attends Christianity service: Not on file     Active member of club or organization: Not on file     Attends meetings of clubs or organizations: Not on file     Relationship status: Not on file    Intimate partner violence     Fear of current or ex partner: Not on file     Emotionally abused: Not on file     Physically abused: Not on file     Forced sexual activity: Not on file   Other Topics Concern    Not on file   Social History Narrative    Pt lives with son, DIL, and 3 grandchildren. She has one dog. She is retired. ALLERGIES: Lisinopril and Oxycodone    Review of Systems   Constitutional: Negative. Negative for chills. HENT: Negative.     Eyes: Negative. Respiratory: Negative. Cardiovascular: Negative. Gastrointestinal: Positive for nausea. Negative for vomiting. Genitourinary: Positive for dysuria and urgency. Negative for decreased urine volume, difficulty urinating, dyspareunia, enuresis, flank pain, frequency, hematuria, hesitancy, menstrual problem, vaginal bleeding, vaginal discharge and vaginal pain. Musculoskeletal: Negative. Skin: Negative. Neurological: Negative. Psychiatric/Behavioral: Negative. All other systems reviewed and are negative. Vitals:    08/03/20 1106   BP: 164/78   Pulse: 90   Resp: 16   Temp: 98.3 °F (36.8 °C)   SpO2: 94%   Weight: 99.8 kg (220 lb)   Height: 5' 3.5\" (1.613 m)            Physical Exam  Vitals signs and nursing note reviewed. Constitutional:       Appearance: She is well-developed. HENT:      Head: Normocephalic and atraumatic. Right Ear: External ear normal.      Left Ear: External ear normal.      Nose: Nose normal.   Eyes:      Conjunctiva/sclera: Conjunctivae normal.      Pupils: Pupils are equal, round, and reactive to light. Neck:      Musculoskeletal: Normal range of motion and neck supple. Cardiovascular:      Rate and Rhythm: Normal rate and regular rhythm. Heart sounds: Normal heart sounds. Pulmonary:      Effort: Pulmonary effort is normal.      Breath sounds: Normal breath sounds. Abdominal:      General: Bowel sounds are normal.      Palpations: Abdomen is soft. Tenderness: There is abdominal tenderness in the suprapubic area. Genitourinary:     Comments: karen Rosales into assist with perineal exam.  Musculoskeletal: Normal range of motion. Skin:     General: Skin is warm and dry. Neurological:      Mental Status: She is alert and oriented to person, place, and time. Deep Tendon Reflexes: Reflexes are normal and symmetric. Psychiatric:         Behavior: Behavior normal.         Thought Content:  Thought content normal. Judgment: Judgment normal.          MDM  Number of Diagnoses or Management Options     Amount and/or Complexity of Data Reviewed  Clinical lab tests: ordered  Tests in the radiology section of CPT®: ordered  Discuss the patient with other providers: yes (Dr. Samira Tuttle)    Risk of Complications, Morbidity, and/or Mortality  Presenting problems: moderate  Diagnostic procedures: moderate  Management options: moderate           Procedures    1:30 PM Spoke with Dr. Samira Tuttle concerning patient. We discussed the history, physical exam and work-up. 2:40 PM Spoke again with Dr. Samira Tuttle, he would like urine sent for culture with dysuria. Patient also has tinea in her groin and in the perineal area. I will send her with Diflucan 1 dose now and 1 in a week. I will also treat with Macrobid with the symptomatic dysuria per his request.  We will await the culture on the urine and see what it shows as well. She has no pain in her abdomen. Patient is stable for discharge and ambulatory out of the ER without difficulty at this time. The patient was observed in the ED. Results Reviewed:      Recent Results (from the past 24 hour(s))   CBC WITH AUTOMATED DIFF    Collection Time: 08/03/20 11:11 AM   Result Value Ref Range    WBC 11.5 (H) 4.3 - 11.1 K/uL    RBC 5.11 4.05 - 5.2 M/uL    HGB 14.9 11.7 - 15.4 g/dL    HCT 45.1 35.8 - 46.3 %    MCV 88.3 79.6 - 97.8 FL    MCH 29.2 26.1 - 32.9 PG    MCHC 33.0 31.4 - 35.0 g/dL    RDW 12.4 11.9 - 14.6 %    PLATELET 519 627 - 884 K/uL    MPV 10.7 9.4 - 12.3 FL    ABSOLUTE NRBC 0.00 0.0 - 0.2 K/uL    DF AUTOMATED      NEUTROPHILS 62 43 - 78 %    LYMPHOCYTES 28 13 - 44 %    MONOCYTES 6 4.0 - 12.0 %    EOSINOPHILS 2 0.5 - 7.8 %    BASOPHILS 1 0.0 - 2.0 %    IMMATURE GRANULOCYTES 1 0.0 - 5.0 %    ABS. NEUTROPHILS 7.2 1.7 - 8.2 K/UL    ABS. LYMPHOCYTES 3.2 0.5 - 4.6 K/UL    ABS. MONOCYTES 0.7 0.1 - 1.3 K/UL    ABS. EOSINOPHILS 0.3 0.0 - 0.8 K/UL    ABS. BASOPHILS 0.1 0.0 - 0.2 K/UL    ABS. IMM. Rajesh Edward. 0.1 0.0 - 0.5 K/UL   METABOLIC PANEL, COMPREHENSIVE    Collection Time: 08/03/20 11:11 AM   Result Value Ref Range    Sodium 135 (L) 136 - 145 mmol/L    Potassium 4.2 3.5 - 5.1 mmol/L    Chloride 102 98 - 107 mmol/L    CO2 27 21 - 32 mmol/L    Anion gap 6 (L) 7 - 16 mmol/L    Glucose 220 (H) 65 - 100 mg/dL    BUN 15 8 - 23 MG/DL    Creatinine 0.77 0.6 - 1.0 MG/DL    GFR est AA >60 >60 ml/min/1.73m2    GFR est non-AA >60 >60 ml/min/1.73m2    Calcium 9.3 8.3 - 10.4 MG/DL    Bilirubin, total 0.5 0.2 - 1.1 MG/DL    ALT (SGPT) 23 12 - 65 U/L    AST (SGOT) 15 15 - 37 U/L    Alk. phosphatase 140 (H) 50 - 136 U/L    Protein, total 8.2 6.3 - 8.2 g/dL    Albumin 3.4 3.2 - 4.6 g/dL    Globulin 4.8 (H) 2.3 - 3.5 g/dL    A-G Ratio 0.7 (L) 1.2 - 3.5     C REACTIVE PROTEIN, QT    Collection Time: 08/03/20 11:11 AM   Result Value Ref Range    C-Reactive protein 0.6 0.0 - 0.9 mg/dL   LACTIC ACID    Collection Time: 08/03/20  1:53 PM   Result Value Ref Range    Lactic acid 1.3 0.4 - 2.0 MMOL/L     XR CHEST PA LAT   Final Result   Impression: Stable two-view chest.            US ABD LTD   Final Result   IMPRESSION: Unremarkable exam.          I discussed the results of all labs, procedures, radiographs, and treatments with the patient and available family. Treatment plan is agreed upon and the patient is ready for discharge. All voiced understanding of the discharge plan and medication instructions or changes as appropriate. Questions about treatment in the ED were answered. All were encouraged to return should symptoms worsen or new problems develop.

## 2020-08-03 NOTE — ED NOTES
I have reviewed discharge instructions with the patient. The patient verbalized understanding. Patient left ED via Discharge Method: ambulatory to Home with (self). Opportunity for questions and clarification provided. Patient given 2 scripts. No e-sign. To continue your aftercare when you leave the hospital, you may receive an automated call from our care team to check in on how you are doing. This is a free service and part of our promise to provide the best care and service to meet your aftercare needs.  If you have questions, or wish to unsubscribe from this service please call [phone]. Thank you for Choosing our [facility] Emergency Department.

## 2020-08-03 NOTE — DISCHARGE INSTRUCTIONS
Patient Education        Painful Urination (Dysuria): Care Instructions  Your Care Instructions  Burning pain with urination (dysuria) is a common symptom of a urinary tract infection or other urinary problems. The bladder may become inflamed. This can cause pain when the bladder fills and empties. You may also feel pain if the tube that carries urine from the bladder to the outside of the body (urethra) gets irritated or infected. Sexually transmitted infections (STIs) also may cause pain when you urinate. Sometimes the pain can be caused by things other than an infection. The urethra can be irritated by soaps, perfumes, or foreign objects in the urethra. Kidney stones can cause pain when they pass through the urethra. The cause may be hard to find. You may need tests. Treatment for painful urination depends on the cause. Follow-up care is a key part of your treatment and safety. Be sure to make and go to all appointments, and call your doctor if you are having problems. It's also a good idea to know your test results and keep a list of the medicines you take. How can you care for yourself at home? · Drink extra water for the next day or two. This will help make the urine less concentrated. (If you have kidney, heart, or liver disease and have to limit fluids, talk with your doctor before you increase the amount of fluids you drink.)  · Avoid drinks that are carbonated or have caffeine. They can irritate the bladder. · Urinate often. Try to empty your bladder each time. For women:  · Urinate right after you have sex. · After going to the bathroom, wipe from front to back. · Avoid douches, bubble baths, and feminine hygiene sprays. And avoid other feminine hygiene products that have deodorants. When should you call for help? Call your doctor now or seek immediate medical care if:  · You have new symptoms, such as fever, nausea, or vomiting. · You have new or worse symptoms of a urinary problem.  For example:  ? You have blood or pus in your urine. ? You have chills or body aches. ? It hurts worse to urinate. ? You have groin or belly pain. ? You have pain in your back just below your rib cage (the flank area). Watch closely for changes in your health, and be sure to contact your doctor if you have any problems. Where can you learn more? Go to http://anibal-lamont.info/  Enter H814 in the search box to learn more about \"Painful Urination (Dysuria): Care Instructions. \"  Current as of: August 22, 2019               Content Version: 12.5  © 4714-8925 [employer]. Care instructions adapted under license by [employer] (which disclaims liability or warranty for this information). If you have questions about a medical condition or this instruction, always ask your healthcare professional. [address] any warranty or liability for your use of this information. Patient Education        Candidiasis: Care Instructions  Your Care Instructions  Candidiasis (say \"yec-clx-LW-uh-roma\") is a yeast infection. Yeast normally lives in your body. But it can cause problems if your body's defenses don't work as they should. Some medicines can increase your chance of getting a yeast infection. These include antibiotics, steroids, and cancer drugs. And some diseases like AIDS and diabetes can make you more likely to get yeast infections. There are different types of yeast infections. Magdalene Magallanes is a yeast infection in the mouth. It usually occurs in people with weak immune systems. It causes white patches inside the mouth and throat. Yeast infections of the skin usually occur in skin folds where the skin stays moist. They cause red, oozing patches on your skin. Babies can get these infections under the diaper. People who often wear gloves can get them on their hands. Many women get vaginal yeast infections. They are most common when women take antibiotics. These infections can cause the vagina to itch and burn. They also cause white discharge that looks like cottage cheese. In rare cases, yeast infects the blood. This can cause serious disease. This kind of infection is treated with medicine given through a needle into a vein (IV). After you start treatment, a yeast infection usually goes away quickly. But if your immune system is weak, the infection may come back. Tell your doctor if you get yeast infections often. Follow-up care is a key part of your treatment and safety. Be sure to make and go to all appointments, and call your doctor if you are having problems. It's also a good idea to know your test results and keep a list of the medicines you take. How can you care for yourself at home? · Take your medicines exactly as prescribed. Call your doctor if you think you are having a problem with your medicine. · Use antibiotics only as directed by your doctor. · Eat yogurt with live cultures. It has bacteria called lactobacillus. It may help prevent some types of yeast infections. · Keep your skin clean and dry. Put powder on moist places. · If you are using a cream or suppository to treat a vaginal yeast infection, don't use condoms or a diaphragm. Use a different type of birth control. · Eat a healthy diet and get regular exercise. This will help keep your immune system strong. When should you call for help? Watch closely for changes in your health, and be sure to contact your doctor if:  · You do not get better as expected. Where can you learn more? Go to http://www.gray.com/  Enter B470 in the search box to learn more about \"Candidiasis: Care Instructions. \"  Current as of: November 8, 2019               Content Version: 12.5  © 0938-4999 [employer]. Care instructions adapted under license by [employer] (which disclaims liability or warranty for this information).  If you have questions about a medical condition or this instruction, always ask your healthcare professional. [place] [address] any warranty or liability for your use of this information.

## 2020-08-03 NOTE — ED TRIAGE NOTES
Patient ambulatory to triage without difficulty with mask in place. Patient reports dysuria, bilateral leg pain, and pain around vagina. States symptoms began about 1 week ago. Patient reports hx of surgery on urethra and having some mesh placed. States she has a big knot in the middle of her vagina. States she does not know if it is a prolapsed bladder or not. States it is painful and round. Denies redness or draining.

## 2020-08-05 LAB
BACTERIA SPEC CULT: NORMAL
BACTERIA SPEC CULT: NORMAL
SERVICE CMNT-IMP: NORMAL

## 2021-05-17 NOTE — PROGRESS NOTES
Shift assessment completed, pt a/o denies any pain or discomfort will continue to monitor not applicable (Male)

## 2022-09-13 NOTE — PROGRESS NOTES
Critical Care Outreach Nurse Progress Report:    Subjective: In to assess pt secondary to transfer from unit. MEWS Score: 1 (04/18/18 0755)    Vitals:    04/18/18 0143 04/18/18 0419 04/18/18 0439 04/18/18 0755   BP:  117/65  128/59   Pulse:  (!) 59  64   Resp:  18  18   Temp:  97.6 °F (36.4 °C)  97.4 °F (36.3 °C)   SpO2: 97% 96% 95% 96%   Weight:       Height:            Objective: Patient sitting in bed. Pain Intensity 1: 6 (04/18/18 0827)  Pain Location 1: Rib cage  Pain Intervention(s) 1: Medication (see MAR)  Patient Stated Pain Goal: 0    Assessment: Patient alert and oriented. On 3L NC, Sp02 95%. HR 60. Wearing Bipap at night. No new concerns this morning. Plan: Will continue to monitor per outreach protocol. Detail Level: Zone

## 2024-07-29 NOTE — ADT AUTH CERT NOTES
Called patient, left voicemail to call office back to schedule appt-3rd call           DOS 3/25/19 by Jeannie Bruno RN  
 
   
Review Status Review Entered In Primary 3/27/2019 15:21  
   
Criteria Review Pulmonary Disease  Progress Notes  Date of Service:  03/25/19 1021 Subjective: 
Repeated admissions with AECOPD with continued smoking (since she was 14 yo) 
  
Assessment: COPD exacerbation ([facility] (2/6/2019) Solumedrol, nebs Tobacco abuse (4/15/2018) Smoking cessation discussed 
  
  Nocturnal hypoxemia (4/15/2018) Morbid obesity ([facility] (4/17/2018) JEREMÍAS (obstructive sleep apnea)- intolerant of CPAP (2/4/2019) On O2 Q HS 
  
  Acute respiratory failure ([facility] (2/4/2019) Assess RA sat 
  
PLAN: 
Smoking cessation imperative. Nicoderm. Albuterol, pulmicort Taper steroids, may be able to convert to prednisone tomorrow if able Assess o2 needs 
  
  
Hospitalist Internal Medicine  Progress Notes  Date of Service:  03/25/19 1041 Subjective: 
03/25/2019 -seen She is back down to her baseline wheezing and sob. O2 requirements are down. She should be able to be discharged tomorrow. 
  
Objective: 
Temp Pulse Resp BP SpO2    
03/25/19 0909     96 % 03/25/19 0846  (!) 56     
03/25/19 0808 98.4 °F (36.9 °C) (!) 50 16 142/66 94 % 03/25/19 0344 97.9 °F (36.6 °C) (!) 53 20 133/74 96 % 03/24/19 2343 98.1 °F (36.7 °C) (!) 58 18 131/61 96 % 03/24/19 2043     93 %  
  
  
Oxygen Therapy O2 Sat (%): 96 % (03/25/19 0909) Pulse via Oximetry: 85 beats per minute (03/25/19 0909) O2 Device: Nasal cannula (03/25/19 0909) O2 Flow Rate (L/min): 2 l/min (03/25/19 0909) 
  
Assessment and Plan: 
(Principal) COPD exacerbation JEREMÍAS (obstructive sleep apnea)- intolerant of CPAP (Chronic) Acute respiratory failure 
  PLAN:   
·           Copd exacerbation continue current medical mgt;pulm input appreciated ·           Acute on chronic resp failure- resolved ·           Dm- uncontrolled on steroids- continue to titrate her insulin- now on lantus 98 units given the steroids ·           Continue appropriate home meds ·           Further workup and mgt based on her clinical course 
  
DC planning/Dispo:  Home likely tomorrow DVT ppx:  lovenox 
  
  
Respiratory Therapy Progress Notes  Date of Service:  03/25/19 1214 Pt on Room Air at rest SAT-85%. Pt placed on 1 L at rest SAT-88%. Pt placed on 2L at rest SAT-90%. Pt on 2L and Ambulated 6+ Min SAT-90%. Pt back to her bed on 2L seated SAT-90%  
   
DOS 3/24/19 by Teresa Liang RN  
 
   
Review Status Review Entered In Primary 3/27/2019 15:16  
   
Criteria Review Pulmonary Disease  Progress Notes  Date of Service:  03/24/19 0849 Subjective: 
Still wheezing - not much change,  On 2 L O2, 1 bl culture +- coag neg staph- probable contam 
  
Assessment:  (Medical Decision Making) (Principal) COPD exacerbation JEREMÍAS (obstructive sleep apnea)- intolerant of CPAP (Chronic) Acute respiratory failure 
  
Plan:  (Medical Decision Making) 1   cxr today 2  Sputum culture today 3   Iv steroids, bds, 
  
Hospitalist Internal Medicine Progress Notes  Date of Service:  03/24/19 1605 Subjective: 
03/24/2019- seen - sob - wheezing - no adverse overnight events. 
  
Objective: 
Temp Pulse Resp BP SpO2    
03/24/19 1558     94 % 03/24/19 1136 98.1 °F (36.7 °C) 64 18 127/78 93 % 03/24/19 1116     94 % 03/24/19 0804     95 % 03/24/19 0726 98 °F (36.7 °C) (!) 54 18 123/70 92 % 03/24/19 0349 97.6 °F (36.4 °C) (!) 54 18 127/64 96 % 03/23/19 2329 98.2 °F (36.8 °C) (!) 59 18 131/68 93 % 03/23/19 2010     94 % 03/23/19 1943 98.7 °F (37.1 °C) (!) 59 18 127/65 94 %  
  Oxygen Therapy O2 Sat (%): 94 % (03/24/19 1558) Pulse via Oximetry: 55 beats per minute (03/24/19 1558) O2 Device: Hi flow nasal cannula (03/24/19 1558) O2 Flow Rate (L/min): 2 l/min (03/24/19 2840) 
  Assessment and Plan: 
(Principal) COPD exacerbation JEREMÍAS (obstructive sleep apnea)- intolerant of CPAP (Chronic) Acute respiratory failure (HCC) 
  
PLAN:   
            Copd exacerbation continue current medical mgt;pulm input appreciated ·           Acute on chronic resp failure- wean o2 as tolerated ·           Dm- uncontrolled on steroids- continue to titrate her insulin- now on lantus 98 units ·           Continue appropriate home meds ·           Further workup and mgt based on her clinical course 
  
DC planning/Dispo:  Home - hopefully next 1-2 days DVT ppx:  lovenox 
  
  
   
   
DOS 3/23/19 by Odilia Russ RN  
 
   
Review Status Review Entered In Primary 3/27/2019 15:11  
   
Criteria Review Hospitalist Internal Medicine  Progress Notes  Date of Service:  03/23/19 1954 Subjective: 
03/23/2019- seen - sob; wheezing; no adverse overnight events reported 
  
Objective: 
03/23/19 0829  65     
03/23/19 0751 98.3 °F (36.8 °C) 61 16 106/64   
03/23/19 0339 98.3 °F (36.8 °C) 62 18 123/61 92 % 03/22/19 2333     95 % 03/22/19 2331 97.4 °F (36.3 °C) 68 18 (!) 135/94 93 %  
  Oxygen Therapy O2 Sat (%): 92 % (03/23/19 0339) Pulse via Oximetry: 60 beats per minute (03/22/19 2333) O2 Device: Hi flow nasal cannula (03/22/19 2333) O2 Flow Rate (L/min): 4 l/min (03/22/19 2333) 
  Assessment and Plan: COPD exacerbation 
  
PLAN:   
·           Copd exacerbation continue current medical mgt; f/up pulm eval 
·           Acute on chronic resp failure- wean o2 as tolerated ·           Dm- uncontrolled on steroids- continue to titrate her insulin ·           Continue appropriate home meds ·           further workup and mgt based on her clinical course 
  
DC planning/Dispo:  Home - hopefully next 1-2 days DVT ppx:  lovenox 
  
  
Pulmonary Disease  Consults  Date of Service:  03/23/19 1511 Assessment:  (Medical Decision Making) (Principal) COPD exacerbation JEREMÍAS (obstructive sleep apnea)- intolerant of CPAP (Chronic) Acute Respiratory failure 
  
Plan:  (Medical Decision Making) --Duo-neb >>>albuterol / spiriva / advair >>>pulmicort 
--levaquin 
--solumedrol 40 mg IV q 12 hours >>>increase to q 6 hours 
--mucinex 
--smoking cessation 
--agree with continued weaning of O2.   Currently on o2 at 2 lpm via NC.   
--PT following for mobility - ambulated 250' yesterday

## 2024-12-28 NOTE — DISCHARGE SUMMARY
Physician Discharge Summary       Patient: Meggan Humphreys MRN: [id number]  SSN: [id number]    YOB: 1950  Age: 75 y.o. Sex: female    PCP: Yordy Adams MD    Allergies: Lisinopril and Oxycodone    Admit date: 4/14/2018  Admitting Provider: Flora Mitchell MD    Discharge date: 4/19/2018  Discharging Provider: Osman Rosa MD    * Admission Diagnoses: Acute respiratory failure [facility])    * Discharge Diagnoses:    Hospital Problems as of 4/19/2018  Date Reviewed: 4/19/2018          Codes Class Noted - Resolved POA    Morbid obesity ([facility] (Chronic) ICD-10-CM: E66.01  ICD-9-CM: 278.01  4/17/2018 - Present Yes        Bradycardia ICD-10-CM: R00.1  ICD-9-CM: 427.89  4/16/2018 - Present Yes        COPD exacerbation ([facility] ICD-10-CM: J44.1  ICD-9-CM: 491.21  4/16/2018 - Present Yes        DM (diabetes mellitus) w/o complication (Chronic) LER-39-HC: E11.9  ICD-9-CM: 250.00  4/15/2018 - Present Yes        COPD (chronic obstructive pulmonary disease) ([facility] (Chronic) ICD-10-CM: J44.9  ICD-9-CM: 958  4/15/2018 - Present         Hypothyroidism (Chronic) ICD-10-CM: E03.9  ICD-9-CM: 244.9  4/15/2018 - Present Yes        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 Heathertu Giles  ICD-9-CM: E888.9  4/15/2018 - Present Yes        * (Principal)Acute on chronic respiratory failure with hypoxia and hypercapnia (HCC) ICD-10-CM: J96.21, J96.22  ICD-9-CM: 518.84, 786.09, 799.02  4/14/2018 - Present Yes [facility] Course:     Ms. Rob Chavez is a 74 yo WF, with a pmh of chronic respiratory failure due to 3 liter dependent COPD, who presented 4/15 for cough and sob and was found to be in acute on chronic respiratory failure due to an acute exacerbation of her copd. She required transfer to ICU for several days of bipap therapy but now is back on her home nasal cannula. Sawyer removed yesterday and she is urinating independently. Solumedrol changed to oral prednisone yesterday. Is being discharged on daily advair. Had head lice which were treated. She is now medically stable for discharge on a prednisone taper and oral zithromax. Consults: Pulmonary/Intensive care    Significant Diagnostic Studies:     HEAD CT WITHOUT CONTRAST  4/15/2018      HISTORY:   Confusion/delirium, altered LOC, unexplained     TECHNIQUE: Noncontrast axial images were obtained through the brain. All CT  scans at this facility used dose modulation, interactive reconstruction and/or  weight based dosing when appropriate to reduce radiation dose to as low as  reasonably achievable.     COMPARISON: June 24, 2016     FINDINGS: There is no acute intracranial hemorrhage, significant mass effect or  CT evidence of acute large artery territorial infarction. Please note that a  hyperacute infarct or small vessel infarct may not be apparent on initial CT  imaging.     Areas of low attenuation in the supratentorial white matter are suggestive of  chronic small vessel ischemic disease changes.     There is no hydrocephalus , intra-axial mass or abnormal extra-axial fluid  collection. There are no displaced skull fractures. The mastoid air cells and  paranasal sinuses are clear where imaged.     IMPRESSION  IMPRESSION: No acute intracranial findings.     Discharge Exam:    General: awake, alert, oriented, overweight  Eyes; non icteric, EOMI  Neck; supple  CV: RRR  Pulm; mild exp wheezing, non labored  Abd; soft, non tender, active bowel sounds    * Discharge Condition: stable  * Disposition: Home    Discharge Medications:  Current Discharge Medication List      START taking these medications    Details   predniSONE (DELTASONE) 20 mg tablet Take 2 tabs daily x 2 days, take 1.5 tabs daily x 2 days, take 1 tab daily x 2 days, take 1/2 tab daily x 2 days then stop. Qty: 12 Tab, Refills: 0      azithromycin (ZITHROMAX) 250 mg tablet Take 1 Tab by mouth daily for 4 days. Qty: 4 Tab, Refills: 0      albuterol-ipratropium (DUO-NEB) 2.5 mg-0.5 mg/3 ml nebu 3 mL by Nebulization route every six (6) hours as needed. Qty: 30 Nebule, Refills: 0      fluticasone-salmeterol (ADVAIR) 250-50 mcg/dose diskus inhaler Take 1 Puff by inhalation two (2) times a day. Qty: 1 Inhaler, Refills: 1         CONTINUE these medications which have CHANGED    Details   ALPRAZolam (XANAX) 2 mg tablet Take 0.5 Tabs by mouth three (3) times daily as needed for Anxiety. Max Daily Amount: 3 mg. Take / use AM day of surgery  per anesthesia protocols. Indications: ANXIETY WITH DEPRESSION  Qty: 1 Tab, Refills: 0    Associated Diagnoses: Acute on chronic respiratory failure with hypoxia and hypercapnia (HCC)         CONTINUE these medications which have NOT CHANGED    Details   pioglitazone (ACTOS) 30 mg tablet Take 30 mg by mouth daily. Indications: type 2 diabetes mellitus      diclofenac (VOLTAREN) 1 % gel Apply  to affected area four (4) times daily. Indications: OSTEOARTHRITIS OF THE KNEE      guaiFENesin (MUCINEX) 1,200 mg Ta12 ER tablet Take 1,200 mg by mouth two (2) times a day. Indications: COLD SYMPTOMS, Cough      niacin ER (NIASPAN) 500 mg tablet TAKE ONE TABLET BY MOUTH ONCE DAILY FOR TRIGLYCERIDES AND CHOLESTEROL. Qty: 30 Tab, Refills: 5      fenofibrate micronized (LOFIBRA) 200 mg capsule TAKE ONE CAPSULE BY MOUTH EACH NIGHT AT BEDTIME FOR TRIGLYCERIDES AND CHOLESTEROL. Qty: 30 Cap, Refills: 6      clopidogrel (PLAVIX) 75 mg tab Take 1 Tab by mouth daily.   Qty: 90 Tab, Refills: 3 pantoprazole (PROTONIX) 40 mg tablet Take 40 mg by mouth two (2) times a day. Take / use AM day of surgery  per anesthesia protocols. Indications: GASTROESOPHAGEAL REFLUX      aspirin 81 mg tablet Take 81 mg by mouth daily. Take / use AM day of surgery  per anesthesia protocols. escitalopram (LEXAPRO) 20 mg tablet Take 20 mg by mouth daily. Take / use AM day of surgery  per anesthesia protocols. Indications: ANXIETY WITH DEPRESSION      levothyroxine (SYNTHROID) 100 mcg tablet Take 100 mcg by mouth Daily (before breakfast). Per anesthesia protocol:instructed to take am of surgery. Indications: HYPOTHYROIDISM      rosuvastatin (CRESTOR) 10 mg tablet Take 10 mg by mouth nightly. Indications: HYPERCHOLESTEROLEMIA      losartan (COZAAR) 25 mg tablet TAKE ONE TABLET BY MOUTH EVERY DAY FOR BLOOD PRESSURE  Qty: 30 Tab, Refills: 0      loperamide (IMMODIUM) 2 mg tablet Take 2 mg by mouth four (4) times daily as needed for Diarrhea. meclizine (ANTIVERT) 25 mg tablet Take 25 mg by mouth three (3) times daily as needed. Indications: VERTIGO      albuterol (PROAIR HFA) 90 mcg/actuation inhaler Take 2 Puffs by inhalation every four (4) hours as needed. Take / use AM day of surgery  per anesthesia protocols if needed      nitroglycerin (NITROSTAT) 0.4 mg SL tablet 0.4 mg by SubLINGual route every five (5) minutes as needed. Take / use AM day of surgery  per anesthesia protocols if needed      insulin glargine (LANTUS) 100 unit/mL injection 80 Units by SubCUTAneous route nightly. Indications: TYPE 2 DIABETES MELLITUS         STOP taking these medications       Insulin Lisp & Lisp Prot, Hum, (HUMALOG 75-25 MIX) 100 unit/mL (75-25) inpn Comments:   Reason for Stopping:               * Follow-up Care/Patient Instructions:   Activity: as tolerated  Diet: diabetic    Follow-up Information     Follow up With Details Comments [address], [address] [facility] [address]  [place] [address]  [phone] BETO Syed On 5/15/2018 12:30 PM [address]  [phone]          35 minutes spent in discharge planning and coordination of care.      Signed:  Max Chamroro MD  4/19/2018  10:09 AM negative...

## 2025-06-25 NOTE — PROGRESS NOTES
Problem: Mobility Impaired (Adult and Pediatric) Goal: *Acute Goals and Plan of Care (Insert Text) Description LTG: 
(1.)Ms. Pickering will move from supine to sit and sit to supine , scoot up and down and roll side to side with INDEPENDENT within 7 treatment day(s). (2.)Ms. Pickering will transfer from bed to chair and chair to bed with INDEPENDENT using the least restrictive device within 7 treatment day(s). (3.)Ms. Pickering will ambulate with MODIFIED INDEPENDENCE for 250+ feet with the least restrictive device within 7 treatment day(s) while maintaining normal vital signs. (4.)Ms. Pickering will perform 6 steps with HR and SUPERVISION within 7 treatment days for safety ascending and descending stairs for home.  
________________________________________________________________________________________________ Outcome: Progressing Towards Goal 
  
PHYSICAL THERAPY: Initial Assessment and AM 3/22/2019 INPATIENT: PT Visit Days : 1 Payor: Kelvin Rivera / Plan: [id number] / Product Type: Managed Care Medicare /   
  
NAME/AGE/GENDER: Abigail De Jesus is a 70 y.o. female PRIMARY DIAGNOSIS: COPD exacerbation ([facility] [J44.1] <principal problem not specified> 
 <principal problem not specified> 
 
  
  
ICD-10: Treatment Diagnosis:  
 Difficulty in walking, Not elsewhere classified (R26.2) Precaution/Allergies: 
Lisinopril and Oxycodone ASSESSMENT:  
 
Ms. Olinda Kebede presents with decreased bed mobility, transfers, ambulation, balance, activity tolerance, strength and overall general functional mobility s/p hospital admission with COPD exacerbation. Pt to ED on 3/21/19 with SOB, weakness, cough; pt recently hospitalized for flu in February and states feels has not fully recovered. Pt A & O x 4 this am, on 5 L/min O2 via n.c. Pt states lives with multiple family members in mobile home with 6 steps to enter w/ HR.  Pt reports independent in with ambulation in home, O2 PRN on 2 L/min mainly with sleeping. Pt has rollator but does not use. Pt states has family members 24/7 assist in home. Pt today independent in bed mobility, SBA to CGA with transfers. Pt ambulated into hallway for 250' with RW on 5 L/min O2, stats 95%. Pt fatigued after ambulation, slow steady seth, 1 standing break required. Pt declined need for HHPT presently, stating multiple family members to assist. PT to cont to follow for acute care needs. This section established at most recent assessment PROBLEM LIST (Impairments causing functional limitations): 
Decreased ADL/Functional Activities Decreased Transfer Abilities Decreased Ambulation Ability/Technique Decreased Balance Decreased Activity Tolerance Increased Fatigue Increased Shortness of Breath Decreased [place] with Home Exercise Program 
 INTERVENTIONS PLANNED: (Benefits and precautions of physical therapy have been discussed with the patient.) Balance Exercise Bed Mobility Family Education Gait Training Home Exercise Program (HEP) Therapeutic Activites Therapeutic Exercise/Strengthening Transfer Training TREATMENT PLAN: Frequency/Duration: 3 times a week for duration of hospital stay Rehabilitation Potential For Stated Goals: Good RECOMMENDED REHABILITATION/EQUIPMENT: (at time of discharge pending progress): Due to the probability of continued deficits (see above) this patient will not likely need continued skilled physical therapy after discharge. Equipment:  
None at this time HISTORY:  
History of Present Injury/Illness (Reason for Referral): Ms. Suhail Oconnell is a 69 yo female with PMH of COPD on 2 L nocturnal O2, CAD, uterine cancer, DM II, HTN, JEREMÍAS does not use CPAP, smoker who presents with c/o generalized weakness and SOB worse than baseline X2 days. CXR showed mild pulmonary edema, BNP 28.  Pt recently DC in Feb for same. Intubated for COPD exacerbation in August 2018 per pt. Is followed by Pulmonary in [address]. Was started on levaquin in ED. Pt denies CP, n/v/d, abd pain. Past Medical History/Comorbidities: Ms. Archana Pozo  has a past medical history of Anxiety, Arthritis, ASCAD - of the native vessel (2/27/2013), Asthma, CAD (coronary artery disease), Cancer ([address] Chronic pain, Claustrophobia, COPD, Current smoker, Degenerative joint disease, Depression, Diabetes ([address] Diverticulosis, DM (diabetes mellitus) w/o complication (6/65/7730), Dyslipidemia, Fatty liver, Fibromyalgia, GERD (gastroesophageal reflux disease), H/O echocardiogram (01/20/14), HTN (hypertension) - controlled, benign (2/27/2013), Hyperlipemia, Hypertension, Hypothyroidism, IBS (irritable bowel syndrome), Influenza A (H5N1) (2/4/2019), Lipid - hyperlipidemia other unsp dyslipidemia (6/9/2016), Macular degeneration, Mass of kidney, Murmur, Myalgia and myositis, unspecified, Obesity, JEREMÍAS (obstructive sleep apnea)- intolerant of CPAP (2/4/2019), Osteoarthrosis, unspecified whether generalized or localized, unspecified site, Psychiatric disorder, PUD (peptic ulcer disease) (1980), S/P total knee arthroplasty (6/22/2016), Seizures ([address] (12/2011), Sleep apnea, Status post total knee replacement (2/27/2013), Syncope and collapse (6/9/2016), Tobacco use disorder (6/9/2016), Urinary, incontinence, stress female, and Vertigo. She also has no past medical history of Adverse effect of anesthesia, Aneurysm ([address] Arrhythmia, Autoimmune disease ([address] Chronic kidney disease, Coagulation defects, Dementia, Difficult intubation, Endocarditis, Heart failure ([address] Ill-defined condition, Malignant hyperthermia due to anesthesia, Nausea & vomiting, Pseudocholinesterase deficiency, Rheumatic fever, Stroke ([address] Thromboembolus ([address] or Unspecified adverse effect of anesthesia.   Ms. Archana Pozo  has a past surgical history that includes colonoscopy; hx knee arthroscopy (Right); hx carpal tunnel release (Right); hx ankle fracture tx (Right); hx appendectomy; hx knee replacement (Left); hx lap cholecystectomy; pr cardiac surg procedure unlist; hx breast lumpectomy (Bilateral); hx hysterectomy (1979); hx tubal ligation; and hx urological. 
Social History/Living Environment:  
Home Environment: Trailer/mobile home # Steps to Enter: 6 Rails to Enter: Yes One/Two Story Residence: One story Living Alone: No 
Support Systems: Child(keaton), Family member(s), Friends \ neighbors Patient Expects to be Discharged to[de-identified] Trailer/mobile home Current DME Used/Available at Home: [employer] Novak, [address] chair, Walker, rollator Tub or Shower Type: Tub/Shower combination(performs sponge bath) Prior Level of Function/Work/Activity: 
Lives with family, O2 PRN and for sleeping, has RW and cane does not use; indep ADLs with additional time, no falls Personal Factors:   
      Sex:  female Age:  70 y.o. Overall Behavior:  agreeable Number of Personal Factors/Comorbidities that affect the Plan of Care: 
COPD, smoker, DM 3+: HIGH COMPLEXITY EXAMINATION:  
Most Recent Physical Functioning:  
Gross Assessment: 
AROM: Within functional limits(B LE) Strength: Generally decreased, functional(B LE) Coordination: Generally decreased, functional 
         
  
Posture: 
Posture (WDL): Exceptions to [facility] Posture Assessment: Rounded shoulders Balance: 
Sitting: Intact Standing: Impaired Standing - Static: Good Standing - Dynamic : Fair Bed Mobility: 
Rolling: Independent Supine to Sit: Supervision Sit to Supine: (left up in chair) Scooting: Independent Wheelchair Mobility: 
  
Transfers: 
Sit to Stand: Stand-by assistance;Contact guard assistance Stand to Sit: Stand-by assistance;Contact guard assistance Bed to Chair: Contact guard assistance Gait: 
  
Base of Support: Widened Speed/Coral: Slow Step Length: Left shortened;Right shortened Swing Pattern: Left symmetrical;Right symmetrical 
Gait Abnormalities: Decreased step clearance Distance (ft): 250 Feet (ft)(5 L/min O2) Assistive Device: Walker, rolling Ambulation - Level of Assistance: Contact guard assistance Interventions: Safety awareness training;Verbal cues Body Structures Involved: Muscles Body Functions Affected: Movement Related Activities and Participation Affected: 
General Tasks and Demands Mobility Self Care Number of elements that affect the Plan of Care: 4+: HIGH COMPLEXITY CLINICAL PRESENTATION:  
Presentation: Evolving clinical presentation with changing clinical characteristics: MODERATE COMPLEXITY CLINICAL DECISION [address] [place] [address] AM-PAC? ?6 Clicks? Basic Mobility Inpatient Short Form How much difficulty does the patient currently have. .. Unable A Lot A Little None 1. Turning over in bed (including adjusting bedclothes, sheets and blankets)? ? 1   ? 2   ? 3   ? 4  
2. Sitting down on and standing up from a chair with arms ( e.g., wheelchair, bedside commode, etc.)   ? 1   ? 2   ? 3   ? 4  
3. Moving from lying on back to sitting on the side of the bed?   ? 1   ? 2   ? 3   ? 4 How much help from another person does the patient currently need. .. Total A Lot A Little None 4. Moving to and from a bed to a chair (including a wheelchair)? ? 1   ? 2   ? 3   ? 4  
5. Need to walk in hospital room? ? 1   ? 2   ? 3   ? 4  
6. Climbing 3-5 steps with a railing? ? 1   ? 2   ? 3   ? 4  
© , Trustees of [address] [place] [address], under license to [employer]. All rights reserved Score:  Initial: 21 Most Recent: X (Date: -- ) Interpretation of Tool:  Represents activities that are increasingly more difficult (i.e. Bed mobility, Transfers, Gait).  
 
Medical Necessity:    
Patient is expected to demonstrate progress in strength, range of motion, balance and coordination 
 to decrease assistance required with overall functional mobility, transfers, ambulation [place] Michele Patient demonstrates good 
 rehab potential due to higher previous functional level. Reason for Services/Other Comments: 
Patient continues to require present interventions due to patient's inability to perform transfer and ambulation safely and effectively [place] Use of outcome tool(s) and clinical judgement create a POC that gives a: Clear prediction of patient's progress: LOW COMPLEXITY  
  
 
 
 
TREATMENT:  
(In addition to Assessment/Re-Assessment sessions the following treatments were rendered) Pre-treatment Symptoms/Complaints:  \"I am doing a little better\" Pain: Initial:  
Pain Intensity 1: 0  Post Session:  0/10 post session in chair Therapeutic Activity: (    10 min): Therapeutic activities including Chair transfers, Ambulation on level ground and walker safety, posture, pursed lip breathing  to improve mobility, strength, balance and coordination. Required minimal Safety awareness training;Verbal cues to promote static and dynamic balance in standing and promote coordination of bilateral, lower extremity(s). Braces/Orthotics/Lines/Etc:  
O2 Device: Hi flow nasal cannula Treatment/Session Assessment:   
Response to Treatment:  tolerated well Interdisciplinary Collaboration:  
Physical Therapist 
Registered Nurse After treatment position/precautions:  
Up in chair Bed/Chair-wheels locked Bed in low position Call light within reach PCT at bedside Compliance with Program/Exercises: Will assess as treatment progresses Recommendations/Intent for next treatment session: \"Next visit will focus on advancements to more challenging activities\". Total Treatment Duration: PT Patient Time In/Time Out Time In: 6689 Time Out: 2939 Gatito Nuñez PT  
    
  
 Home